# Patient Record
Sex: FEMALE | Race: WHITE | NOT HISPANIC OR LATINO | Employment: OTHER | URBAN - NONMETROPOLITAN AREA
[De-identification: names, ages, dates, MRNs, and addresses within clinical notes are randomized per-mention and may not be internally consistent; named-entity substitution may affect disease eponyms.]

---

## 2017-04-03 RX ORDER — TRIAMTERENE AND HYDROCHLOROTHIAZIDE 37.5; 25 MG/1; MG/1
TABLET ORAL
Qty: 90 TABLET | Refills: 3 | Status: SHIPPED | OUTPATIENT
Start: 2017-04-03 | End: 2018-03-09 | Stop reason: CLARIF

## 2017-04-12 DIAGNOSIS — I48.91 ATRIAL FIBRILLATION, NEW ONSET (HCC): ICD-10-CM

## 2017-04-12 RX ORDER — DABIGATRAN ETEXILATE 150 MG/1
150 CAPSULE, COATED PELLETS ORAL DAILY
Qty: 30 CAPSULE | Refills: 5 | Status: SHIPPED | OUTPATIENT
Start: 2017-04-12 | End: 2017-09-07 | Stop reason: SDUPTHER

## 2017-04-17 ENCOUNTER — HOSPITAL ENCOUNTER (OUTPATIENT)
Dept: MAMMOGRAPHY | Facility: HOSPITAL | Age: 82
Discharge: HOME | End: 2017-04-17
Attending: INTERNAL MEDICINE | Admitting: INTERNAL MEDICINE

## 2017-04-17 DIAGNOSIS — C50.512 MALIGNANT NEOPLASM OF LOWER-OUTER QUADRANT OF LEFT FEMALE BREAST (HCC): ICD-10-CM

## 2017-04-17 PROCEDURE — G0279 TOMOSYNTHESIS, MAMMO: HCPCS

## 2017-04-17 PROCEDURE — G0206 DX MAMMO INCL CAD UNI: HCPCS

## 2017-05-05 ENCOUNTER — NURSE ONLY (OUTPATIENT)
Dept: PRIMARY CARE CLINIC | Age: 82
End: 2017-05-05
Payer: MEDICARE

## 2017-05-05 DIAGNOSIS — I10 ESSENTIAL HYPERTENSION: Primary | ICD-10-CM

## 2017-05-05 DIAGNOSIS — E11.9 TYPE 2 DIABETES MELLITUS WITHOUT COMPLICATION, WITHOUT LONG-TERM CURRENT USE OF INSULIN (HCC): ICD-10-CM

## 2017-05-05 DIAGNOSIS — E78.00 PURE HYPERCHOLESTEROLEMIA: ICD-10-CM

## 2017-05-05 LAB
ALBUMIN SERPL-MCNC: 4.4 G/DL (ref 3.5–5.2)
ALP BLD-CCNC: 73 U/L (ref 35–104)
ALT SERPL-CCNC: 19 U/L (ref 5–33)
ANION GAP SERPL CALCULATED.3IONS-SCNC: 18 MMOL/L (ref 7–19)
AST SERPL-CCNC: 24 U/L (ref 5–32)
BILIRUB SERPL-MCNC: 0.4 MG/DL (ref 0.2–1.2)
BUN BLDV-MCNC: 17 MG/DL (ref 8–23)
CALCIUM SERPL-MCNC: 9.8 MG/DL (ref 8.8–10.2)
CHLORIDE BLD-SCNC: 93 MMOL/L (ref 98–111)
CHOLESTEROL, TOTAL: 257 MG/DL (ref 160–199)
CO2: 26 MMOL/L (ref 22–29)
CREAT SERPL-MCNC: 1 MG/DL (ref 0.5–0.9)
GFR NON-AFRICAN AMERICAN: 53
GLOBULIN: 2.5 G/DL
GLUCOSE BLD-MCNC: 160 MG/DL (ref 74–109)
HBA1C MFR BLD: 7.3 %
HCT VFR BLD CALC: 41.1 % (ref 37–47)
HDLC SERPL-MCNC: 61 MG/DL (ref 65–121)
HEMOGLOBIN: 13.8 G/DL (ref 12–16)
LDL CHOLESTEROL CALCULATED: 143 MG/DL
MCH RBC QN AUTO: 30.7 PG (ref 27–31)
MCHC RBC AUTO-ENTMCNC: 33.6 G/DL (ref 33–37)
MCV RBC AUTO: 91.3 FL (ref 81–99)
PDW BLD-RTO: 11.8 % (ref 11.5–14.5)
PLATELET # BLD: 251 K/UL (ref 130–400)
PMV BLD AUTO: 9.9 FL (ref 7.4–10.4)
POTASSIUM SERPL-SCNC: 3.8 MMOL/L (ref 3.5–5)
RBC # BLD: 4.5 M/UL (ref 4.2–5.4)
SODIUM BLD-SCNC: 137 MMOL/L (ref 136–145)
TOTAL PROTEIN: 6.9 G/DL (ref 6.6–8.7)
TRIGL SERPL-MCNC: 263 MG/DL (ref 150–199)
WBC # BLD: 7.6 K/UL (ref 4.8–10.8)

## 2017-05-05 PROCEDURE — 36415 COLL VENOUS BLD VENIPUNCTURE: CPT | Performed by: FAMILY MEDICINE

## 2017-05-11 RX ORDER — ATORVASTATIN CALCIUM 20 MG/1
20 TABLET, FILM COATED ORAL DAILY
Qty: 30 TABLET | Refills: 3 | Status: SHIPPED | OUTPATIENT
Start: 2017-05-11 | End: 2017-10-19 | Stop reason: ALTCHOICE

## 2017-05-18 ENCOUNTER — TRANSCRIBE ORDERS (OUTPATIENT)
Dept: ADMINISTRATIVE | Facility: HOSPITAL | Age: 82
End: 2017-05-18

## 2017-05-18 DIAGNOSIS — R19.05 ABDOMINAL SWELLING, PERIUMBILICAL REGION: Primary | ICD-10-CM

## 2017-05-18 DIAGNOSIS — R10.33 ABDOMINAL PAIN, PERIUMBILIC: ICD-10-CM

## 2017-06-02 ENCOUNTER — HOSPITAL ENCOUNTER (OUTPATIENT)
Dept: CT IMAGING | Facility: HOSPITAL | Age: 82
Discharge: HOME | End: 2017-06-02
Attending: INTERNAL MEDICINE | Admitting: INTERNAL MEDICINE

## 2017-06-02 DIAGNOSIS — R19.05 ABDOMINAL SWELLING, PERIUMBILICAL REGION: ICD-10-CM

## 2017-06-02 DIAGNOSIS — R10.33 ABDOMINAL PAIN, PERIUMBILIC: ICD-10-CM

## 2017-06-02 LAB — CREAT BLDA-MCNC: 0.9 MG/DL (ref 0.6–1.3)

## 2017-06-02 PROCEDURE — 74177 CT ABD & PELVIS W/CONTRAST: CPT

## 2017-06-02 PROCEDURE — 0 IOPAMIDOL 61 % SOLUTION: Performed by: INTERNAL MEDICINE

## 2017-06-02 PROCEDURE — 82565 ASSAY OF CREATININE: CPT

## 2017-06-02 RX ADMIN — IOPAMIDOL 100 ML: 612 INJECTION, SOLUTION INTRAVENOUS at 12:45

## 2017-09-06 ENCOUNTER — OFFICE VISIT (OUTPATIENT)
Dept: PRIMARY CARE CLINIC | Age: 82
End: 2017-09-06
Payer: MEDICARE

## 2017-09-06 VITALS
HEART RATE: 68 BPM | RESPIRATION RATE: 16 BRPM | BODY MASS INDEX: 36.49 KG/M2 | HEIGHT: 59 IN | TEMPERATURE: 97.5 F | DIASTOLIC BLOOD PRESSURE: 72 MMHG | WEIGHT: 181 LBS | SYSTOLIC BLOOD PRESSURE: 102 MMHG

## 2017-09-06 DIAGNOSIS — E78.00 PURE HYPERCHOLESTEROLEMIA: ICD-10-CM

## 2017-09-06 DIAGNOSIS — F41.8 ANXIETY ASSOCIATED WITH DEPRESSION: ICD-10-CM

## 2017-09-06 DIAGNOSIS — Z00.00 ROUTINE GENERAL MEDICAL EXAMINATION AT A HEALTH CARE FACILITY: Primary | ICD-10-CM

## 2017-09-06 DIAGNOSIS — I10 ESSENTIAL HYPERTENSION: ICD-10-CM

## 2017-09-06 DIAGNOSIS — E11.9 TYPE 2 DIABETES MELLITUS WITHOUT COMPLICATION, WITHOUT LONG-TERM CURRENT USE OF INSULIN (HCC): ICD-10-CM

## 2017-09-06 LAB
ANION GAP SERPL CALCULATED.3IONS-SCNC: 19 MMOL/L (ref 7–19)
BUN BLDV-MCNC: 17 MG/DL (ref 8–23)
CALCIUM SERPL-MCNC: 9.7 MG/DL (ref 8.8–10.2)
CHLORIDE BLD-SCNC: 97 MMOL/L (ref 98–111)
CO2: 24 MMOL/L (ref 22–29)
CREAT SERPL-MCNC: 1.1 MG/DL (ref 0.5–0.9)
GFR NON-AFRICAN AMERICAN: 48
GLUCOSE BLD-MCNC: 127 MG/DL (ref 74–109)
HBA1C MFR BLD: 6.7 %
POTASSIUM SERPL-SCNC: 3.3 MMOL/L (ref 3.5–5)
SODIUM BLD-SCNC: 140 MMOL/L (ref 136–145)

## 2017-09-06 PROCEDURE — 1036F TOBACCO NON-USER: CPT | Performed by: FAMILY MEDICINE

## 2017-09-06 PROCEDURE — G8427 DOCREV CUR MEDS BY ELIG CLIN: HCPCS | Performed by: FAMILY MEDICINE

## 2017-09-06 PROCEDURE — 36415 COLL VENOUS BLD VENIPUNCTURE: CPT | Performed by: FAMILY MEDICINE

## 2017-09-06 PROCEDURE — 1090F PRES/ABSN URINE INCON ASSESS: CPT | Performed by: FAMILY MEDICINE

## 2017-09-06 PROCEDURE — 1123F ACP DISCUSS/DSCN MKR DOCD: CPT | Performed by: FAMILY MEDICINE

## 2017-09-06 PROCEDURE — 99213 OFFICE O/P EST LOW 20 MIN: CPT | Performed by: FAMILY MEDICINE

## 2017-09-06 PROCEDURE — 4040F PNEUMOC VAC/ADMIN/RCVD: CPT | Performed by: FAMILY MEDICINE

## 2017-09-06 PROCEDURE — G8417 CALC BMI ABV UP PARAM F/U: HCPCS | Performed by: FAMILY MEDICINE

## 2017-09-06 PROCEDURE — G8400 PT W/DXA NO RESULTS DOC: HCPCS | Performed by: FAMILY MEDICINE

## 2017-09-06 PROCEDURE — G0438 PPPS, INITIAL VISIT: HCPCS | Performed by: FAMILY MEDICINE

## 2017-09-06 RX ORDER — PAROXETINE HYDROCHLORIDE 20 MG/1
TABLET, FILM COATED ORAL
Qty: 90 TABLET | Refills: 3 | Status: SHIPPED | OUTPATIENT
Start: 2017-09-06 | End: 2018-02-06 | Stop reason: SDUPTHER

## 2017-09-06 ASSESSMENT — PATIENT HEALTH QUESTIONNAIRE - PHQ9: SUM OF ALL RESPONSES TO PHQ QUESTIONS 1-9: 2

## 2017-09-06 ASSESSMENT — LIFESTYLE VARIABLES: HOW OFTEN DO YOU HAVE A DRINK CONTAINING ALCOHOL: 0

## 2017-09-06 ASSESSMENT — ANXIETY QUESTIONNAIRES: GAD7 TOTAL SCORE: 2

## 2017-09-07 ENCOUNTER — OFFICE VISIT (OUTPATIENT)
Dept: CARDIOLOGY | Age: 82
End: 2017-09-07
Payer: MEDICARE

## 2017-09-07 VITALS
SYSTOLIC BLOOD PRESSURE: 110 MMHG | WEIGHT: 180 LBS | BODY MASS INDEX: 36.29 KG/M2 | HEART RATE: 106 BPM | DIASTOLIC BLOOD PRESSURE: 64 MMHG | HEIGHT: 59 IN

## 2017-09-07 DIAGNOSIS — I10 ESSENTIAL HYPERTENSION: ICD-10-CM

## 2017-09-07 DIAGNOSIS — I38 VALVULAR HEART DISEASE: ICD-10-CM

## 2017-09-07 DIAGNOSIS — I48.91 ATRIAL FIBRILLATION, NEW ONSET (HCC): Primary | ICD-10-CM

## 2017-09-07 PROCEDURE — 1036F TOBACCO NON-USER: CPT | Performed by: INTERNAL MEDICINE

## 2017-09-07 PROCEDURE — G8428 CUR MEDS NOT DOCUMENT: HCPCS | Performed by: INTERNAL MEDICINE

## 2017-09-07 PROCEDURE — G8417 CALC BMI ABV UP PARAM F/U: HCPCS | Performed by: INTERNAL MEDICINE

## 2017-09-07 PROCEDURE — 93000 ELECTROCARDIOGRAM COMPLETE: CPT | Performed by: INTERNAL MEDICINE

## 2017-09-07 PROCEDURE — 1123F ACP DISCUSS/DSCN MKR DOCD: CPT | Performed by: INTERNAL MEDICINE

## 2017-09-07 PROCEDURE — G8400 PT W/DXA NO RESULTS DOC: HCPCS | Performed by: INTERNAL MEDICINE

## 2017-09-07 PROCEDURE — 99213 OFFICE O/P EST LOW 20 MIN: CPT | Performed by: INTERNAL MEDICINE

## 2017-09-07 PROCEDURE — 4040F PNEUMOC VAC/ADMIN/RCVD: CPT | Performed by: INTERNAL MEDICINE

## 2017-09-07 PROCEDURE — 1090F PRES/ABSN URINE INCON ASSESS: CPT | Performed by: INTERNAL MEDICINE

## 2017-09-07 RX ORDER — DABIGATRAN ETEXILATE 150 MG/1
150 CAPSULE, COATED PELLETS ORAL 2 TIMES DAILY
Qty: 60 CAPSULE | Refills: 5 | Status: SHIPPED | OUTPATIENT
Start: 2017-09-07 | End: 2018-04-04 | Stop reason: SDUPTHER

## 2017-09-10 PROBLEM — I10 ESSENTIAL HYPERTENSION: Status: ACTIVE | Noted: 2017-09-10

## 2017-09-10 RX ORDER — CHOLESTYRAMINE LIGHT 4 G/5.7G
POWDER, FOR SUSPENSION ORAL
Qty: 1 CAN | Refills: 5 | Status: ON HOLD | OUTPATIENT
Start: 2017-09-10 | End: 2017-11-09

## 2017-09-10 ASSESSMENT — ENCOUNTER SYMPTOMS: GASTROINTESTINAL NEGATIVE: 1

## 2017-09-11 DIAGNOSIS — R26.9 GAIT ABNORMALITY: ICD-10-CM

## 2017-09-11 DIAGNOSIS — R26.89 LOSS OF BALANCE: Primary | ICD-10-CM

## 2017-10-06 ENCOUNTER — CLINICAL SUPPORT (OUTPATIENT)
Dept: FAMILY MEDICINE CLINIC | Facility: CLINIC | Age: 82
End: 2017-10-06

## 2017-10-06 DIAGNOSIS — Z23 FLU VACCINE NEED: Primary | ICD-10-CM

## 2017-10-06 PROCEDURE — G0008 ADMIN INFLUENZA VIRUS VAC: HCPCS | Performed by: FAMILY MEDICINE

## 2017-10-06 PROCEDURE — 90662 IIV NO PRSV INCREASED AG IM: CPT | Performed by: FAMILY MEDICINE

## 2017-10-06 NOTE — PROGRESS NOTES
Patient is here today for a flu vaccination . Patient has read and signed consent form. Patient has no fever or illness. Patient was given injection in the left deltoid intramuscular. Patient had no reactions or complaints. Flu vacc. Information sheet was given to patient .

## 2017-10-19 ENCOUNTER — OFFICE VISIT (OUTPATIENT)
Dept: CARDIOLOGY | Age: 82
End: 2017-10-19
Payer: MEDICARE

## 2017-10-19 VITALS
WEIGHT: 175 LBS | SYSTOLIC BLOOD PRESSURE: 100 MMHG | DIASTOLIC BLOOD PRESSURE: 62 MMHG | HEIGHT: 60 IN | HEART RATE: 70 BPM | BODY MASS INDEX: 34.36 KG/M2

## 2017-10-19 DIAGNOSIS — I38 VALVULAR HEART DISEASE: ICD-10-CM

## 2017-10-19 DIAGNOSIS — I10 ESSENTIAL HYPERTENSION: ICD-10-CM

## 2017-10-19 DIAGNOSIS — I48.19 PERSISTENT ATRIAL FIBRILLATION (HCC): Primary | ICD-10-CM

## 2017-10-19 PROCEDURE — 99213 OFFICE O/P EST LOW 20 MIN: CPT | Performed by: INTERNAL MEDICINE

## 2017-10-19 PROCEDURE — G8484 FLU IMMUNIZE NO ADMIN: HCPCS | Performed by: INTERNAL MEDICINE

## 2017-10-19 PROCEDURE — G8417 CALC BMI ABV UP PARAM F/U: HCPCS | Performed by: INTERNAL MEDICINE

## 2017-10-19 PROCEDURE — 1036F TOBACCO NON-USER: CPT | Performed by: INTERNAL MEDICINE

## 2017-10-19 PROCEDURE — 4040F PNEUMOC VAC/ADMIN/RCVD: CPT | Performed by: INTERNAL MEDICINE

## 2017-10-19 PROCEDURE — 1090F PRES/ABSN URINE INCON ASSESS: CPT | Performed by: INTERNAL MEDICINE

## 2017-10-19 PROCEDURE — G8400 PT W/DXA NO RESULTS DOC: HCPCS | Performed by: INTERNAL MEDICINE

## 2017-10-19 PROCEDURE — G8427 DOCREV CUR MEDS BY ELIG CLIN: HCPCS | Performed by: INTERNAL MEDICINE

## 2017-10-19 PROCEDURE — 1123F ACP DISCUSS/DSCN MKR DOCD: CPT | Performed by: INTERNAL MEDICINE

## 2017-10-19 RX ORDER — SOTALOL HYDROCHLORIDE 80 MG/1
80 TABLET ORAL 2 TIMES DAILY
Qty: 60 TABLET | Refills: 3 | Status: SHIPPED | OUTPATIENT
Start: 2017-10-19 | End: 2017-12-20 | Stop reason: ALTCHOICE

## 2017-10-19 RX ORDER — SIMVASTATIN 20 MG
20 TABLET ORAL NIGHTLY
COMMUNITY
End: 2018-02-13 | Stop reason: SDUPTHER

## 2017-10-23 NOTE — PROGRESS NOTES
[facility]  [facility], [address], [address] [place] [address], [address] [facility]  The following was transcribed by Ariel Hoyt M.T. Carmen Best : 1935, 80 y.o. Female        Office Visit:  10/19/2017    Chief Complaint   Patient presents with    Follow-up     This is a 6-week follow up.  pt states no cardiac symptoms       HISTORY OF PRESENT ILLNESS  Ms. Carmen Best is seen here for atrial fibrillation, valvular heart disease and hypertension. Saundra presents having recently been in atrial fibrillation and was not on anticoagulation, her Betapace was discontinued and started on Pradaxa approximately six weeks ago. She is having no angina, no overt heart failure, and no syncope. Patient Active Problem List   Diagnosis Code    S/P aortic valve replacement Z95.2    Hyperlipidemia E78.5    Chest pain R07.9    Diabetes mellitus ([facility] E11.9    Family history of early CAD Z80.55    Insomnia G47.00    H/O bicuspid aortic valve Z87.74    History of aortic stenosis Z86.79    Atrial fibrillation, new onset ([facility] I48.91    Valvular heart disease I38    Moderate tricuspid regurgitation I07.1    Essential hypertension I10     Past Medical History:   Diagnosis Date    A-fib ([facility] 2006    s/p surgery     Cancer [facility])     breast    Chest pain     Diabetes mellitus ([facility]     non-insulin dependent    Diaphoresis     profuse    Family history of early CAD     Gastroesophageal reflux disease     H/O bicuspid aortic valve 2015    History of blood transfusion     History of phlebitis     Hx of blood clots     Hyperlipidemia     Hypertension     Moderate tricuspid regurgitation 2016    Osteoarthritis     Valvular heart disease      Past Surgical History:   Procedure Laterality Date    AORTIC VALVE REPLACEMENT  2006    Aortic valve replacement with 21 mm Janett-Lozano pericardial tissue valve.   Addison Alston M.D.   [facility]  AV FISTULA REPAIR      BREAST SURGERY      left masectomy    CARDIOVERSION  01/14/2016    CATARACT REMOVAL      CHOLECYSTECTOMY      COLONOSCOPY      DIAGNOSTIC CARDIAC CATH LAB PROCEDURE  2006    left heart cath, left ventriculography and selective  coronary arteriography    EYE SURGERY      HYSTERECTOMY      JOINT REPLACEMENT      TONSILLECTOMY AND ADENOIDECTOMY      TOTAL KNEE ARTHROPLASTY      bilateral total knee replacement      Family History   Problem Relation Age of Onset    Arthritis Mother     Heart Disease Mother     High Blood Pressure Mother     Heart Disease Father      Social History   Substance Use Topics    Smoking status: Never Smoker    Smokeless tobacco: Never Used    Alcohol use No      Allergies   Allergen Reactions    Sulfa Antibiotics Rash     Outpatient Prescriptions Marked as Taking for the 10/19/17 encounter (Office Visit) with Nasir Gallagher MD   Medication Sig Dispense Refill    simvastatin (ZOCOR) 20 MG tablet Take 20 mg by mouth nightly      sotalol (BETAPACE) 80 MG tablet Take 1 tablet by mouth 2 times daily 60 tablet 3    cholestyramine light (PREVALITE) 4 GM/DOSE powder 1 capful in 8 ounces of water 3 times a day before a meal for diarrhea 1 Can 5    dabigatran (PRADAXA) 150 MG capsule Take 1 capsule by mouth 2 times daily 60 capsule 5    PARoxetine (PAXIL) 20 MG tablet 1 tab po q day for anxiety and depression 90 tablet 3    losartan-hydrochlorothiazide (HYZAAR) 50-12.5 MG per tablet TAKE ONE TABLET BY MOUTH DAILY FOR BLOOD PRESSURE 90 tablet 3    metFORMIN (GLUCOPHAGE) 500 MG tablet Take 1 tablet by mouth 2 times daily (with meals) 60 tablet 3    triamterene-hydrochlorothiazide (MAXZIDE-25) 37.5-25 MG per tablet TAKE ONE TABLET ONCE DAILY FOR FLUID RETENTION & BLOOD PRESSURE 90 tablet 3    omeprazole (PRILOSEC) 40 MG delayed release capsule TAKE ONE CAPSULE BY MOUTH DAILY 90 capsule 3    PROLENSA 0.07 % SOLN 1 drop daily       ibuprofen (ADVIL;MOTRIN) 600 MG tablet TAKE ONE TABLET BY MOUTH EVERY SIX HOURS AS NEEDED FOR PAIN ** MAYCAUSE DROWSINESS 16 tablet 0    bimatoprost (LUMIGAN) 0.03 % ophthalmic drops 1 drop nightly. Data:  BP Readings from Last 3 Encounters:   10/19/17 100/62   09/07/17 110/64   09/06/17 102/72    Pulse Readings from Last 3 Encounters:   10/19/17 70   09/07/17 106   09/06/17 68        REVIEW OF SYSTEMS  Constitutional:  Negative for diaphoresis, fever, appetite change or unexpected weight change. HENT:  Negative for nosebleeds, facial swelling, rhinorrhea and neck stiffness. RESPIRATORY:  Negative shortness of breath, cough or sputum production. No wheezing or stridor. CARDIOVASCULAR:  There is no angina, no overt heart failure, and no syncope. GASTROINTESTINAL:   Negative for abdominal distention. GENITOURINARY:  Negative for dysuria, urgency and frequency. MUSCULOSKELETAL:   Negative for myalgia, arthralgia and gait problem. SKIN:  Negative for color change, pallor, rash and wound. NEUROLOGICAL:   Negative for dizziness, weakness, light-headedness, numbness and headaches. Negative for speech difficulty. HEMATOLOGICAL:   Negative for bruising and bleeding easily. PSYCHIATRIC/BEHAVIORAL:   No excessive anxiety or confusion. Except as noted in the HPI, all other systems are negative. PHYSICAL EXAMINATION  GENERAL:  Alert and oriented x3 in no apparent distress. Short-term and long-term memory intact. Judgment intact. Oriented to time, place and person. No depression, anxiety or agitation. Vital Signs:  /62   Pulse 70   Ht 5' (1.524 m)   Wt 175 lb (79.4 kg)   BMI 34.18 kg/m²    HEAD:  Normocephalic without evidence of old or recent trauma. EYES:  Sclerae clear. Conjunctivae pink. Pupils equal and round. NOSE:  Negative nasal discharge or epistaxis. THROAT:  No lesions on lips or buccal mucosa. NECK:  Supple without mass or JVD.   Carotid pulses 2+ to palpation bilaterally

## 2017-10-27 ENCOUNTER — OFFICE VISIT (OUTPATIENT)
Dept: CARDIOLOGY | Age: 82
End: 2017-10-27
Payer: MEDICARE

## 2017-10-27 VITALS
HEIGHT: 60 IN | WEIGHT: 177 LBS | HEART RATE: 90 BPM | SYSTOLIC BLOOD PRESSURE: 130 MMHG | DIASTOLIC BLOOD PRESSURE: 72 MMHG | BODY MASS INDEX: 34.75 KG/M2

## 2017-10-27 DIAGNOSIS — I48.91 ATRIAL FIBRILLATION, NEW ONSET (HCC): Primary | ICD-10-CM

## 2017-10-27 DIAGNOSIS — I10 ESSENTIAL HYPERTENSION: ICD-10-CM

## 2017-10-27 DIAGNOSIS — I48.91 ATRIAL FIBRILLATION, NEW ONSET (HCC): ICD-10-CM

## 2017-10-27 DIAGNOSIS — I38 VALVULAR HEART DISEASE: ICD-10-CM

## 2017-10-27 LAB
ANION GAP SERPL CALCULATED.3IONS-SCNC: 23 MMOL/L (ref 7–19)
BUN BLDV-MCNC: 18 MG/DL (ref 8–23)
CALCIUM SERPL-MCNC: 10.2 MG/DL (ref 8.8–10.2)
CHLORIDE BLD-SCNC: 98 MMOL/L (ref 98–111)
CO2: 23 MMOL/L (ref 22–29)
CREAT SERPL-MCNC: 1.2 MG/DL (ref 0.5–0.9)
GFR NON-AFRICAN AMERICAN: 43
GLUCOSE BLD-MCNC: 142 MG/DL (ref 74–109)
POTASSIUM SERPL-SCNC: 4.2 MMOL/L (ref 3.5–5)
SODIUM BLD-SCNC: 144 MMOL/L (ref 136–145)

## 2017-10-27 PROCEDURE — G8417 CALC BMI ABV UP PARAM F/U: HCPCS | Performed by: INTERNAL MEDICINE

## 2017-10-27 PROCEDURE — 93000 ELECTROCARDIOGRAM COMPLETE: CPT | Performed by: INTERNAL MEDICINE

## 2017-10-27 PROCEDURE — 1090F PRES/ABSN URINE INCON ASSESS: CPT | Performed by: INTERNAL MEDICINE

## 2017-10-27 PROCEDURE — 1123F ACP DISCUSS/DSCN MKR DOCD: CPT | Performed by: INTERNAL MEDICINE

## 2017-10-27 PROCEDURE — G8400 PT W/DXA NO RESULTS DOC: HCPCS | Performed by: INTERNAL MEDICINE

## 2017-10-27 PROCEDURE — G8484 FLU IMMUNIZE NO ADMIN: HCPCS | Performed by: INTERNAL MEDICINE

## 2017-10-27 PROCEDURE — 1036F TOBACCO NON-USER: CPT | Performed by: INTERNAL MEDICINE

## 2017-10-27 PROCEDURE — G8427 DOCREV CUR MEDS BY ELIG CLIN: HCPCS | Performed by: INTERNAL MEDICINE

## 2017-10-27 PROCEDURE — 99213 OFFICE O/P EST LOW 20 MIN: CPT | Performed by: INTERNAL MEDICINE

## 2017-10-27 PROCEDURE — 4040F PNEUMOC VAC/ADMIN/RCVD: CPT | Performed by: INTERNAL MEDICINE

## 2017-10-27 NOTE — PROGRESS NOTES
[facility]  Formerly [facility], [address] [place] [address] Drive, [address], [address]  The following was transcribed by Rosaline Ahumada, M.T. Joyce King : 1935, 80 y.o. Female        Office Visit:  10/27/2017    Chief Complaint   Patient presents with    Follow-up     pt states numbness in fingers     Atrial Fibrillation      HISTORY OF PRESENT ILLNESS  Ms. Joyce King is seen here for atrial fibrillation, valvular heart disease, and hypertension. This is a one week follow up. Saundra presents today having restarted her Betapace. She is feeling okay but remains in atrial fibrillation. There is no angina, no overt heart failure, no syncope, and no stroke-like problems. The patient denies fevers, chills, or evidence of GI bleeding.      Patient Active Problem List   Diagnosis Code    S/P aortic valve replacement Z95.2    Hyperlipidemia E78.5    Chest pain R07.9    Diabetes mellitus ([address] E11.9    Family history of early CAD Z80.55    Insomnia G47.00    H/O bicuspid aortic valve Z87.74    History of aortic stenosis Z86.79    Atrial fibrillation, new onset ([address] I48.91    Valvular heart disease I38    Moderate tricuspid regurgitation I07.1    Essential hypertension I10     Past Medical History:   Diagnosis Date    A-fib ([address] 2006    s/p surgery     Cancer [facility])     breast    Chest pain     Diabetes mellitus ([address]     non-insulin dependent    Diaphoresis     profuse    Family history of early CAD     Gastroesophageal reflux disease     H/O bicuspid aortic valve 2015    History of blood transfusion     History of phlebitis     Hx of blood clots     Hyperlipidemia     Hypertension     Moderate tricuspid regurgitation 2016    Osteoarthritis     Valvular heart disease      Past Surgical History:   Procedure Laterality Date    AORTIC VALVE REPLACEMENT  2006    Aortic valve replacement with 21 mm Janett-Lozano pericardial tissue valve.  Theo Caldera M.D.   [facility] Delma      left masectomy    CARDIOVERSION  01/14/2016    CATARACT REMOVAL      CHOLECYSTECTOMY      COLONOSCOPY      DIAGNOSTIC CARDIAC CATH LAB PROCEDURE  2006    left heart cath, left ventriculography and selective  coronary arteriography    EYE SURGERY      HYSTERECTOMY      JOINT REPLACEMENT      TONSILLECTOMY AND ADENOIDECTOMY      TOTAL KNEE ARTHROPLASTY      bilateral total knee replacement      Family History   Problem Relation Age of Onset    Arthritis Mother     Heart Disease Mother     High Blood Pressure Mother     Heart Disease Father      Social History   Substance Use Topics    Smoking status: Never Smoker    Smokeless tobacco: Never Used    Alcohol use No      Allergies   Allergen Reactions    Sulfa Antibiotics Rash     Outpatient Prescriptions Marked as Taking for the 10/27/17 encounter (Office Visit) with Eve Vivar MD   Medication Sig Dispense Refill    metFORMIN (GLUCOPHAGE) 500 MG tablet TAKE ONE TABLET BY MOUTH TWICE A DAY WITH MEALS 60 tablet 5    simvastatin (ZOCOR) 20 MG tablet Take 20 mg by mouth nightly      sotalol (BETAPACE) 80 MG tablet Take 1 tablet by mouth 2 times daily 60 tablet 3    cholestyramine light (PREVALITE) 4 GM/DOSE powder 1 capful in 8 ounces of water 3 times a day before a meal for diarrhea 1 Can 5    dabigatran (PRADAXA) 150 MG capsule Take 1 capsule by mouth 2 times daily 60 capsule 5    PARoxetine (PAXIL) 20 MG tablet 1 tab po q day for anxiety and depression 90 tablet 3    losartan-hydrochlorothiazide (HYZAAR) 50-12.5 MG per tablet TAKE ONE TABLET BY MOUTH DAILY FOR BLOOD PRESSURE 90 tablet 3    triamterene-hydrochlorothiazide (MAXZIDE-25) 37.5-25 MG per tablet TAKE ONE TABLET ONCE DAILY FOR FLUID RETENTION & BLOOD PRESSURE 90 tablet 3    omeprazole (PRILOSEC) 40 MG delayed release capsule TAKE ONE CAPSULE BY MOUTH DAILY 90 capsule 3    PROLENSA 0.07 % long-term memory intact. Judgment intact. Oriented to time, place and person. No depression, anxiety or agitation. Vital Signs:  /72   Pulse 90   Ht 5' (1.524 m)   Wt 177 lb (80.3 kg)   BMI 34.57 kg/m²    HEAD:  Normocephalic without evidence of old or recent trauma. EYES:  Sclerae clear. Conjunctivae pink. Pupils equal and round. NOSE:  Negative nasal discharge or epistaxis. THROAT:  No lesions on lips or buccal mucosa. NECK:  Supple without mass or JVD. Carotid pulses 2+ to palpation bilaterally without bruit. No thyromegaly noted. CHEST:  Equal bilateral expansion. RESPIRATORY:  The lungs are clear to auscultation. Normal respiratory effort. CARDIOVASCULAR:   The heart's rhythm is irregularly irregular with normal rate. No audible murmur, gallop or rub noted. ABDOMEN:  Soft, nontender. Exhibits no distension. Bowel sounds are normal.   UPPER EXTREMITY EVALUATION:  Radial pulses palpable bilaterally. No cyanosis, clubbing or edema. LOWER EXTREMITY EVALUATION:  Femoral, popliteal, dorsalis pedis, and posterior tibialis pulses 2+ to palpation bilaterally. No cyanosis, clubbing, or peripheral edema. SKIN:  Warm and dry. MUSCULOSKELETAL:  Normal muscle strength and tone. SKIN:  Warm, dry. NEUROLOGIC:  Cranial nerves II through XII are grossly intact. IMPRESSION / PLAN  1. We will admit her for outpatient cardioversion. 2.  She will return for follow up in six weeks. __________________________________  Claudia Johnson M.D., Ph.D., F.A.C.C.   [facility]    cc (pcp): Lenard Barthel, MD

## 2017-11-09 ENCOUNTER — HOSPITAL ENCOUNTER (OUTPATIENT)
Dept: CARDIAC CATH/INVASIVE PROCEDURES | Age: 82
Discharge: HOME | End: 2017-11-09
Attending: INTERNAL MEDICINE | Admitting: INTERNAL MEDICINE
Payer: MEDICARE

## 2017-11-09 VITALS
SYSTOLIC BLOOD PRESSURE: 105 MMHG | DIASTOLIC BLOOD PRESSURE: 54 MMHG | TEMPERATURE: 98.5 F | WEIGHT: 170 LBS | BODY MASS INDEX: 33.38 KG/M2 | RESPIRATION RATE: 16 BRPM | HEIGHT: 60 IN | HEART RATE: 70 BPM | OXYGEN SATURATION: 97 %

## 2017-11-09 PROCEDURE — 6360000002 HC RX W HCPCS

## 2017-11-09 PROCEDURE — 93005 ELECTROCARDIOGRAM TRACING: CPT

## 2017-11-09 PROCEDURE — 92960 CARDIOVERSION ELECTRIC EXT: CPT | Performed by: INTERNAL MEDICINE

## 2017-11-09 PROCEDURE — 99999 PR OFFICE/OUTPT VISIT,PROCEDURE ONLY: CPT | Performed by: INTERNAL MEDICINE

## 2017-11-09 PROCEDURE — 99024 POSTOP FOLLOW-UP VISIT: CPT | Performed by: INTERNAL MEDICINE

## 2017-11-09 PROCEDURE — 2580000003 HC RX 258: Performed by: INTERNAL MEDICINE

## 2017-11-09 RX ORDER — SODIUM CHLORIDE 9 MG/ML
INJECTION, SOLUTION INTRAVENOUS CONTINUOUS
Status: DISCONTINUED | OUTPATIENT
Start: 2017-11-09 | End: 2017-11-09 | Stop reason: HOSPADM

## 2017-11-09 RX ORDER — SODIUM CHLORIDE 0.9 % (FLUSH) 0.9 %
10 SYRINGE (ML) INJECTION PRN
Status: DISCONTINUED | OUTPATIENT
Start: 2017-11-09 | End: 2017-11-09 | Stop reason: HOSPADM

## 2017-11-09 RX ORDER — SODIUM CHLORIDE 0.9 % (FLUSH) 0.9 %
10 SYRINGE (ML) INJECTION EVERY 12 HOURS SCHEDULED
Status: DISCONTINUED | OUTPATIENT
Start: 2017-11-09 | End: 2017-11-09 | Stop reason: HOSPADM

## 2017-11-09 RX ORDER — NAPROXEN SODIUM 220 MG
220 TABLET ORAL 2 TIMES DAILY WITH MEALS
COMMUNITY
End: 2018-09-14

## 2017-11-09 RX ADMIN — SODIUM CHLORIDE: 9 INJECTION, SOLUTION INTRAVENOUS at 10:23

## 2017-11-09 NOTE — DISCHARGE SUMMARY
[facility] Discharge Summary    Patient ID: Mikel Brennan      Patient's PCP: Theodore Noriega MD    Admit Date: 11/9/2017     Discharge Date:  11/10/2017    Admitting Physician: Evert Browne MD     Discharge Physician: Evert Browne MD     Discharge Diagnoses: Atrial fibrillation converted to normal sinus rhythm    There are no active hospital problems to display for this patient. The patient was seen and examined on day of discharge and this discharge summary is in conjunction with any daily progress note from day of discharge. Hospital Course: The patient is an 80-year-old white female was recently found to be in atrial fibrillation. The patient had been treated with Betapace but was receiving no anticoagulant. The Betapace was discontinued and anticoagulation was instituted. The patient is now 6 weeks out from her anticoagulation having resumed betapace she presents for elective cardioversion. The patient has undergone successful cardioversion. A 200 J discharge failed to revert the patient in normal sinus rhythm. A 360 J discharge did result in sinus rhythm. Consults:   None        Disposition:  Home on the same medicines as admission-with follow-up in 6 weeks     Discharge Instructions/Follow-up:  See separate instructions. Activity and Diet: as directed per discharge instructions. Labs:  For convenience and continuity at follow-up the following most recent labs are provided:  CBC:    Lab Results   Component Value Date    WBC 7.6 05/05/2017    HGB 13.8 05/05/2017    HCT 41.1 05/05/2017     05/05/2017       Renal:    Lab Results   Component Value Date     10/27/2017    K 4.2 10/27/2017    CL 98 10/27/2017    CO2 23 10/27/2017    BUN 18 10/27/2017    CREATININE 1.2 10/27/2017    CALCIUM 10.2 10/27/2017       Discharge Medications:   Current Discharge Medication List           Details   naproxen sodium (ALEVE) 220 MG tablet Take 220 mg by mouth 2 times daily (with meals)      metFORMIN (GLUCOPHAGE) 500 MG tablet TAKE ONE TABLET BY MOUTH TWICE A DAY WITH MEALS  Qty: 60 tablet, Refills: 5      simvastatin (ZOCOR) 20 MG tablet Take 20 mg by mouth nightly      sotalol (BETAPACE) 80 MG tablet Take 1 tablet by mouth 2 times daily  Qty: 60 tablet, Refills: 3      dabigatran (PRADAXA) 150 MG capsule Take 1 capsule by mouth 2 times daily  Qty: 60 capsule, Refills: 5    Associated Diagnoses: Atrial fibrillation, new onset (HCC)      PARoxetine (PAXIL) 20 MG tablet 1 tab po q day for anxiety and depression  Qty: 90 tablet, Refills: 3      omeprazole (PRILOSEC) 40 MG delayed release capsule TAKE ONE CAPSULE BY MOUTH DAILY  Qty: 90 capsule, Refills: 3      PROLENSA 0.07 % SOLN 1 drop daily       bimatoprost (LUMIGAN) 0.03 % ophthalmic drops 1 drop nightly. losartan-hydrochlorothiazide (HYZAAR) 50-12.5 MG per tablet TAKE ONE TABLET BY MOUTH DAILY FOR BLOOD PRESSURE  Qty: 90 tablet, Refills: 3      triamterene-hydrochlorothiazide (MAXZIDE-25) 37.5-25 MG per tablet TAKE ONE TABLET ONCE DAILY FOR FLUID RETENTION & BLOOD PRESSURE  Qty: 90 tablet, Refills: 3               Mark D. Edson Cowden, MD

## 2017-11-09 NOTE — H&P
Hx of blood clots      Hyperlipidemia      Hypertension      Moderate tricuspid regurgitation 01/12/2016    Osteoarthritis      Valvular heart disease           Past Surgical History         Past Surgical History:   Procedure Laterality Date    AORTIC VALVE REPLACEMENT   07/28/2006     Aortic valve replacement with 21 mm Janett-Lozano pericardial tissue valve.   Rene Rapp M.D.   Javier Millashish         left masectomy    CARDIOVERSION   01/14/2016    CATARACT REMOVAL        CHOLECYSTECTOMY        COLONOSCOPY        DIAGNOSTIC CARDIAC CATH LAB PROCEDURE   2006     left heart cath, left ventriculography and selective  coronary arteriography    EYE SURGERY        HYSTERECTOMY        JOINT REPLACEMENT        TONSILLECTOMY AND ADENOIDECTOMY        TOTAL KNEE ARTHROPLASTY         bilateral total knee replacement         Family History         Family History   Problem Relation Age of Onset    Arthritis Mother      Heart Disease Mother      High Blood Pressure Mother      Heart Disease Father                Social History   Substance Use Topics    Smoking status: Never Smoker    Smokeless tobacco: Never Used    Alcohol use No           Allergies   Allergen Reactions    Sulfa Antibiotics Rash      Active Medications          Outpatient Prescriptions Marked as Taking for the 10/27/17 encounter (Office Visit) with Hannah Santos MD   Medication Sig Dispense Refill    metFORMIN (GLUCOPHAGE) 500 MG tablet TAKE ONE TABLET BY MOUTH TWICE A DAY WITH MEALS 60 tablet 5    simvastatin (ZOCOR) 20 MG tablet Take 20 mg by mouth nightly        sotalol (BETAPACE) 80 MG tablet Take 1 tablet by mouth 2 times daily 60 tablet 3    cholestyramine light (PREVALITE) 4 GM/DOSE powder 1 capful in 8 ounces of water 3 times a day before a meal for diarrhea 1 Can 5    dabigatran (PRADAXA) 150 MG capsule Take 1 capsule by mouth 2 times daily 60 capsule 5    PARoxetine

## 2017-11-09 NOTE — PROCEDURES
[facility] COLETTE RODRIGUEZ      PATIENT ID: Marie Hanson    ADMIT DATE:  11/9/2017      PROCEDURE: ELECTIVE CARDIOVERSION     DIAGNOSIS:  ATRIAL fibrillation    PROCEDURE:  The patient received 4 mg of Versed and 100 mg of fentanyl. A 200 J joule discharge was delivered failing to convert the patient in normal sinus rhythm. A 360 J discharge was delivered which reverted the patient to normal sinus rhythm. IMPRESSION:  Successful cardioversion from atrial fibrillation to normal sinus rhythm while receiving  Betapace 80 mg by mouth every 12 hours. Bharath Negrete.  Colleen Rosado MD

## 2017-11-13 ENCOUNTER — TELEPHONE (OUTPATIENT)
Dept: PRIMARY CARE CLINIC | Age: 82
End: 2017-11-13

## 2017-11-13 LAB
EKG P AXIS: 43 DEGREES
EKG P AXIS: NORMAL DEGREES
EKG P-R INTERVAL: 234 MS
EKG P-R INTERVAL: NORMAL MS
EKG Q-T INTERVAL: 380 MS
EKG Q-T INTERVAL: 396 MS
EKG QRS DURATION: 108 MS
EKG QRS DURATION: 110 MS
EKG QTC CALCULATION (BAZETT): 387 MS
EKG QTC CALCULATION (BAZETT): 448 MS
EKG T AXIS: 104 DEGREES
EKG T AXIS: 119 DEGREES

## 2017-11-13 RX ORDER — DEXTROMETHORPHAN HYDROBROMIDE AND PROMETHAZINE HYDROCHLORIDE 15; 6.25 MG/5ML; MG/5ML
SYRUP ORAL
Qty: 120 ML | Refills: 0 | Status: SHIPPED | OUTPATIENT
Start: 2017-11-13 | End: 2017-11-20

## 2017-11-13 RX ORDER — DOXYCYCLINE HYCLATE 100 MG/1
100 CAPSULE ORAL 2 TIMES DAILY
Qty: 20 CAPSULE | Refills: 0 | Status: SHIPPED | OUTPATIENT
Start: 2017-11-13 | End: 2017-11-23

## 2017-11-13 NOTE — TELEPHONE ENCOUNTER
Patient called, stated that she has a terrible cough and wants something sent to [facility]. And please have them to deliver it to her. She stated that she did have a fever on Friday but it has since then broke.

## 2017-12-19 ENCOUNTER — TELEPHONE (OUTPATIENT)
Dept: CARDIOLOGY | Age: 82
End: 2017-12-19

## 2017-12-20 ENCOUNTER — OFFICE VISIT (OUTPATIENT)
Dept: CARDIOLOGY | Age: 82
End: 2017-12-20
Payer: MEDICARE

## 2017-12-20 VITALS
HEART RATE: 82 BPM | HEIGHT: 60 IN | WEIGHT: 168 LBS | DIASTOLIC BLOOD PRESSURE: 66 MMHG | SYSTOLIC BLOOD PRESSURE: 112 MMHG | BODY MASS INDEX: 32.98 KG/M2

## 2017-12-20 DIAGNOSIS — I48.19 PERSISTENT ATRIAL FIBRILLATION (HCC): ICD-10-CM

## 2017-12-20 DIAGNOSIS — I48.20 CHRONIC ATRIAL FIBRILLATION (HCC): Primary | ICD-10-CM

## 2017-12-20 DIAGNOSIS — I38 VALVULAR HEART DISEASE: ICD-10-CM

## 2017-12-20 PROCEDURE — 1123F ACP DISCUSS/DSCN MKR DOCD: CPT | Performed by: INTERNAL MEDICINE

## 2017-12-20 PROCEDURE — 4040F PNEUMOC VAC/ADMIN/RCVD: CPT | Performed by: INTERNAL MEDICINE

## 2017-12-20 PROCEDURE — G8427 DOCREV CUR MEDS BY ELIG CLIN: HCPCS | Performed by: INTERNAL MEDICINE

## 2017-12-20 PROCEDURE — G8484 FLU IMMUNIZE NO ADMIN: HCPCS | Performed by: INTERNAL MEDICINE

## 2017-12-20 PROCEDURE — G8417 CALC BMI ABV UP PARAM F/U: HCPCS | Performed by: INTERNAL MEDICINE

## 2017-12-20 PROCEDURE — 93000 ELECTROCARDIOGRAM COMPLETE: CPT | Performed by: INTERNAL MEDICINE

## 2017-12-20 PROCEDURE — G8400 PT W/DXA NO RESULTS DOC: HCPCS | Performed by: INTERNAL MEDICINE

## 2017-12-20 PROCEDURE — 1036F TOBACCO NON-USER: CPT | Performed by: INTERNAL MEDICINE

## 2017-12-20 PROCEDURE — 99213 OFFICE O/P EST LOW 20 MIN: CPT | Performed by: INTERNAL MEDICINE

## 2017-12-20 PROCEDURE — 1090F PRES/ABSN URINE INCON ASSESS: CPT | Performed by: INTERNAL MEDICINE

## 2017-12-30 NOTE — PROGRESS NOTES
130/72    Pulse Readings from Last 3 Encounters:   12/20/17 82   11/09/17 70   10/27/17 90        REVIEW OF SYSTEMS  Constitutional:  Negative for diaphoresis, fever, appetite change or unexpected weight change. HENT:  Negative for nosebleeds, facial swelling, rhinorrhea and neck stiffness. RESPIRATORY:  Negative shortness of breath, cough or sputum production. No wheezing or stridor. CARDIOVASCULAR:  There is no angina, no overt heart failure, and no syncope. GASTROINTESTINAL:   Negative for abdominal distention. GENITOURINARY:  Negative for dysuria, urgency and frequency. MUSCULOSKELETAL:   Negative for myalgia, arthralgia and gait problem. SKIN:  Negative for color change, pallor, rash and wound. NEUROLOGICAL:   Negative for dizziness, weakness, light-headedness, numbness and headaches. Negative for speech difficulty. HEMATOLOGICAL:   Negative for bruising and bleeding easily. PSYCHIATRIC/BEHAVIORAL:   No excessive anxiety or confusion. Except as noted in the HPI, all other systems are negative. PHYSICAL EXAMINATION  GENERAL:  Alert and oriented x3 in no apparent distress. Short-term and long-term memory intact. Judgment intact. Oriented to time, place and person. No depression, anxiety or agitation. Vital Signs:  /66   Pulse 82   Ht 5' (1.524 m)   Wt 168 lb (76.2 kg)   BMI 32.81 kg/m²    HEAD:  Normocephalic without evidence of old or recent trauma. EYES:  Sclerae clear. Conjunctivae pink. Pupils equal and round. NOSE:  Negative nasal discharge or epistaxis. THROAT:  No lesions on lips or buccal mucosa. NECK:  Supple without mass or JVD. Carotid pulses 2+ to palpation bilaterally without bruit. No thyromegaly noted. CHEST:  Equal bilateral expansion. RESPIRATORY:  The lungs are clear to auscultation. Normal respiratory effort. CARDIOVASCULAR: The heart reveals an irregular irregular rhythm with a normal rate. No audible murmurs or gallops.   ABDOMEN:

## 2018-02-01 ENCOUNTER — OFFICE VISIT (OUTPATIENT)
Dept: CARDIOLOGY | Age: 83
End: 2018-02-01
Payer: MEDICARE

## 2018-02-01 VITALS
WEIGHT: 169 LBS | HEIGHT: 59 IN | HEART RATE: 60 BPM | DIASTOLIC BLOOD PRESSURE: 74 MMHG | SYSTOLIC BLOOD PRESSURE: 134 MMHG | BODY MASS INDEX: 34.07 KG/M2

## 2018-02-01 DIAGNOSIS — I48.19 PERSISTENT ATRIAL FIBRILLATION (HCC): Primary | ICD-10-CM

## 2018-02-01 DIAGNOSIS — I10 ESSENTIAL HYPERTENSION: ICD-10-CM

## 2018-02-01 DIAGNOSIS — I38 VALVULAR HEART DISEASE: ICD-10-CM

## 2018-02-01 PROCEDURE — 1036F TOBACCO NON-USER: CPT | Performed by: INTERNAL MEDICINE

## 2018-02-01 PROCEDURE — 1123F ACP DISCUSS/DSCN MKR DOCD: CPT | Performed by: INTERNAL MEDICINE

## 2018-02-01 PROCEDURE — G8400 PT W/DXA NO RESULTS DOC: HCPCS | Performed by: INTERNAL MEDICINE

## 2018-02-01 PROCEDURE — 4040F PNEUMOC VAC/ADMIN/RCVD: CPT | Performed by: INTERNAL MEDICINE

## 2018-02-01 PROCEDURE — 99213 OFFICE O/P EST LOW 20 MIN: CPT | Performed by: INTERNAL MEDICINE

## 2018-02-01 PROCEDURE — G8427 DOCREV CUR MEDS BY ELIG CLIN: HCPCS | Performed by: INTERNAL MEDICINE

## 2018-02-01 PROCEDURE — G8417 CALC BMI ABV UP PARAM F/U: HCPCS | Performed by: INTERNAL MEDICINE

## 2018-02-01 PROCEDURE — 1090F PRES/ABSN URINE INCON ASSESS: CPT | Performed by: INTERNAL MEDICINE

## 2018-02-01 PROCEDURE — G8484 FLU IMMUNIZE NO ADMIN: HCPCS | Performed by: INTERNAL MEDICINE

## 2018-02-05 NOTE — PROGRESS NOTES
112/66   11/09/17 (!) 105/54    Pulse Readings from Last 3 Encounters:   02/01/18 60   12/20/17 82   11/09/17 70        Estimated body mass index is 34.13 kg/m² as calculated from the following:    Height as of this encounter: 4' 11\" (1.499 m). Weight as of this encounter: 169 lb (76.7 kg). REVIEW OF SYSTEMS  Constitutional:  Negative for diaphoresis, fever, appetite change or unexpected weight change. HENT:  Negative for nosebleeds, facial swelling, rhinorrhea and neck stiffness. RESPIRATORY:  Negative shortness of breath, cough or sputum production. No wheezing or stridor. CARDIOVASCULAR:  There is no angina, no overt heart failure, and no syncope. GASTROINTESTINAL:   Negative for abdominal distention. GENITOURINARY:  Negative for dysuria, urgency and frequency. MUSCULOSKELETAL:   Negative for myalgia, arthralgia and gait problem. SKIN:  Negative for color change, pallor, rash and wound. NEUROLOGICAL:   Negative for dizziness, weakness, light-headedness, numbness and headaches. Negative for speech difficulty. HEMATOLOGICAL:   Negative for bruising and bleeding easily. PSYCHIATRIC/BEHAVIORAL:   No excessive anxiety or confusion. Except as noted in the HPI, all other systems are negative. PHYSICAL EXAMINATION  GENERAL:  Alert and oriented x3 in no apparent distress. Short-term and long-term memory intact. Judgment intact. Oriented to time, place and person. No depression, anxiety or agitation. Vital Signs:  /74   Pulse 60   Ht 4' 11\" (1.499 m)   Wt 169 lb (76.7 kg)   BMI 34.13 kg/m²    HEAD:  Normocephalic without evidence of old or recent trauma. EYES:  Sclerae clear. Conjunctivae pink. Pupils equal and round. NOSE:  Negative nasal discharge or epistaxis. THROAT:  No lesions on lips or buccal mucosa. NECK:  Supple without mass or JVD. Carotid pulses 2+ to palpation bilaterally without bruit. No thyromegaly noted.    CHEST:  Equal bilateral expansion. RESPIRATORY:  The lungs are clear to auscultation. Normal respiratory effort. CARDIOVASCULAR:  The heart's rhythm is irregular with a normal rate. No audible murmurs or gallops. ABDOMEN:  Soft, nontender. Exhibits no distension. Bowel sounds are normal.   UPPER EXTREMITY EVALUATION:  Radial pulses palpable bilaterally. No cyanosis, clubbing or edema. LOWER EXTREMITY EVALUATION:  Femoral, popliteal, dorsalis pedis, and posterior tibialis pulses 2+ to palpation bilaterally. No cyanosis, clubbing, or peripheral edema. SKIN:  Warm and dry. MUSCULOSKELETAL:  Normal muscle strength and tone. SKIN:  Warm, dry. NEUROLOGIC:  Cranial nerves II through XII are grossly intact. IMPRESSION / PLAN  1.  Valvular heart disease without heart failure. 2.  Atrial fibrillation which is persistent. 3.  Hypertension which is well controlled. 4.  Continue current medication regimen. 5.  Wellness visit in six months. Return to see me in a year.             __________________________________  Jasmina Blackwood. Gladys Cordova M.D., Ph.D., F.A.C.C.   [facility]    cc (pcp): Karol Page MD

## 2018-02-13 RX ORDER — SIMVASTATIN 20 MG
TABLET ORAL
Qty: 90 TABLET | Refills: 0 | Status: SHIPPED | OUTPATIENT
Start: 2018-02-13 | End: 2018-03-07 | Stop reason: SDUPTHER

## 2018-03-02 RX ORDER — OMEPRAZOLE 40 MG/1
CAPSULE, DELAYED RELEASE ORAL
Qty: 90 CAPSULE | Refills: 0 | Status: SHIPPED | OUTPATIENT
Start: 2018-03-02 | End: 2018-03-07 | Stop reason: SDUPTHER

## 2018-03-07 ENCOUNTER — OFFICE VISIT (OUTPATIENT)
Dept: PRIMARY CARE CLINIC | Age: 83
End: 2018-03-07
Payer: MEDICARE

## 2018-03-07 VITALS
SYSTOLIC BLOOD PRESSURE: 128 MMHG | OXYGEN SATURATION: 98 % | DIASTOLIC BLOOD PRESSURE: 64 MMHG | TEMPERATURE: 97.3 F | HEART RATE: 94 BPM | WEIGHT: 164 LBS | BODY MASS INDEX: 33.06 KG/M2 | HEIGHT: 59 IN

## 2018-03-07 DIAGNOSIS — K21.9 GASTROESOPHAGEAL REFLUX DISEASE, ESOPHAGITIS PRESENCE NOT SPECIFIED: ICD-10-CM

## 2018-03-07 DIAGNOSIS — I10 ESSENTIAL HYPERTENSION: ICD-10-CM

## 2018-03-07 DIAGNOSIS — Z23 NEED FOR 23-POLYVALENT PNEUMOCOCCAL POLYSACCHARIDE VACCINE: ICD-10-CM

## 2018-03-07 DIAGNOSIS — F32.89 OTHER DEPRESSION: ICD-10-CM

## 2018-03-07 DIAGNOSIS — E11.9 TYPE 2 DIABETES MELLITUS WITHOUT COMPLICATION, WITHOUT LONG-TERM CURRENT USE OF INSULIN (HCC): Primary | ICD-10-CM

## 2018-03-07 DIAGNOSIS — R15.9 INCONTINENCE OF FECES, UNSPECIFIED FECAL INCONTINENCE TYPE: ICD-10-CM

## 2018-03-07 DIAGNOSIS — N18.1 CHRONIC KIDNEY DISEASE, STAGE I: ICD-10-CM

## 2018-03-07 DIAGNOSIS — Z91.81 AT HIGH RISK FOR FALLS: ICD-10-CM

## 2018-03-07 DIAGNOSIS — E78.00 PURE HYPERCHOLESTEROLEMIA: ICD-10-CM

## 2018-03-07 LAB
ALBUMIN SERPL-MCNC: 4.1 G/DL (ref 3.5–5.2)
ALP BLD-CCNC: 71 U/L (ref 35–104)
ALT SERPL-CCNC: 10 U/L (ref 5–33)
ANION GAP SERPL CALCULATED.3IONS-SCNC: 14 MMOL/L (ref 7–19)
AST SERPL-CCNC: 20 U/L (ref 5–32)
BILIRUB SERPL-MCNC: 0.4 MG/DL (ref 0.2–1.2)
BUN BLDV-MCNC: 21 MG/DL (ref 8–23)
CALCIUM SERPL-MCNC: 10.1 MG/DL (ref 8.8–10.2)
CHLORIDE BLD-SCNC: 100 MMOL/L (ref 98–111)
CHOLESTEROL, TOTAL: 238 MG/DL (ref 160–199)
CO2: 28 MMOL/L (ref 22–29)
CREAT SERPL-MCNC: 1 MG/DL (ref 0.5–0.9)
GFR NON-AFRICAN AMERICAN: 53
GLUCOSE BLD-MCNC: 127 MG/DL (ref 74–109)
HBA1C MFR BLD: 6.3 %
HDLC SERPL-MCNC: 68 MG/DL (ref 65–121)
LDL CHOLESTEROL CALCULATED: 130 MG/DL
POTASSIUM SERPL-SCNC: 3.4 MMOL/L (ref 3.5–5)
SODIUM BLD-SCNC: 142 MMOL/L (ref 136–145)
TOTAL PROTEIN: 7.3 G/DL (ref 6.6–8.7)
TRIGL SERPL-MCNC: 201 MG/DL (ref 0–149)

## 2018-03-07 PROCEDURE — G8400 PT W/DXA NO RESULTS DOC: HCPCS | Performed by: FAMILY MEDICINE

## 2018-03-07 PROCEDURE — G8417 CALC BMI ABV UP PARAM F/U: HCPCS | Performed by: FAMILY MEDICINE

## 2018-03-07 PROCEDURE — 99214 OFFICE O/P EST MOD 30 MIN: CPT | Performed by: FAMILY MEDICINE

## 2018-03-07 PROCEDURE — 1090F PRES/ABSN URINE INCON ASSESS: CPT | Performed by: FAMILY MEDICINE

## 2018-03-07 PROCEDURE — G8427 DOCREV CUR MEDS BY ELIG CLIN: HCPCS | Performed by: FAMILY MEDICINE

## 2018-03-07 PROCEDURE — 1036F TOBACCO NON-USER: CPT | Performed by: FAMILY MEDICINE

## 2018-03-07 PROCEDURE — 4040F PNEUMOC VAC/ADMIN/RCVD: CPT | Performed by: FAMILY MEDICINE

## 2018-03-07 PROCEDURE — 36415 COLL VENOUS BLD VENIPUNCTURE: CPT | Performed by: FAMILY MEDICINE

## 2018-03-07 PROCEDURE — G0009 ADMIN PNEUMOCOCCAL VACCINE: HCPCS | Performed by: FAMILY MEDICINE

## 2018-03-07 PROCEDURE — 90732 PPSV23 VACC 2 YRS+ SUBQ/IM: CPT | Performed by: FAMILY MEDICINE

## 2018-03-07 PROCEDURE — G8484 FLU IMMUNIZE NO ADMIN: HCPCS | Performed by: FAMILY MEDICINE

## 2018-03-07 PROCEDURE — 1123F ACP DISCUSS/DSCN MKR DOCD: CPT | Performed by: FAMILY MEDICINE

## 2018-03-07 RX ORDER — OMEPRAZOLE 40 MG/1
CAPSULE, DELAYED RELEASE ORAL
Qty: 90 CAPSULE | Refills: 3 | Status: SHIPPED | OUTPATIENT
Start: 2018-03-07 | End: 2018-07-02 | Stop reason: SDUPTHER

## 2018-03-07 RX ORDER — SIMVASTATIN 20 MG
TABLET ORAL
Qty: 90 TABLET | Refills: 3 | Status: SHIPPED | OUTPATIENT
Start: 2018-03-07 | End: 2018-03-09 | Stop reason: SDUPTHER

## 2018-03-09 RX ORDER — LOSARTAN POTASSIUM AND HYDROCHLOROTHIAZIDE 25; 100 MG/1; MG/1
1 TABLET ORAL DAILY
COMMUNITY
End: 2018-03-09 | Stop reason: SDUPTHER

## 2018-03-09 RX ORDER — SIMVASTATIN 20 MG
TABLET ORAL
Qty: 90 TABLET | Refills: 3 | Status: SHIPPED | OUTPATIENT
Start: 2018-03-09 | End: 2018-08-28 | Stop reason: ALTCHOICE

## 2018-03-09 RX ORDER — LOSARTAN POTASSIUM AND HYDROCHLOROTHIAZIDE 25; 100 MG/1; MG/1
1 TABLET ORAL DAILY
Qty: 30 TABLET | Refills: 3 | Status: SHIPPED | OUTPATIENT
Start: 2018-03-09 | End: 2018-07-23 | Stop reason: SDUPTHER

## 2018-03-18 ASSESSMENT — ENCOUNTER SYMPTOMS: RESPIRATORY NEGATIVE: 1

## 2018-03-19 NOTE — PROGRESS NOTES
Subjective:      Patient ID: Jerry Salazar is a 80 y.o. female who comes in today for a checkup. HPI. Medicare will not pay for a checkup as she has multiple problems that we follow including type II diabetes. Her biggest concern is that she is gradually having fecal incontinence. She says even if she just drinks water she will have a bowel movement. She underwent a cholecystectomy 20 years ago. She is losing weight and says she just isn't hungry. She feels good and is pleased with the weight loss. She is currently on metformin 500 mg twice a day. Glucoses are usually less than 120. She says she sleeps okay. She denies any suicidal thoughts or ideation. She does need a refill on her omeprazole which she takes for GERD. This does control her symptoms. She denies any muscle pain from her simvastatin. She is , lives with her dog and does not smoke or use alcohol.   Jerry Salazar is a 80 y.o. female with the following history as recorded in [facility]:  Patient Active Problem List    Diagnosis Date Noted    Type 2 diabetes mellitus without complication, without long-term current use of insulin ([facility] [address] 03/07/2018    Essential hypertension 09/10/2017    Moderate tricuspid regurgitation 09/29/2016    Valvular heart disease     Persistent atrial fibrillation ([facility] [address]     H/O bicuspid aortic valve 05/19/2015    History of aortic stenosis 05/19/2015    Insomnia 01/22/2013    Family history of early CAD     S/P aortic valve replacement 10/11/2011    Hyperlipidemia 10/11/2011    Chest pain 10/11/2011    Diabetes mellitus ([facility] [address] 10/11/2011     Current Outpatient Prescriptions   Medication Sig Dispense Refill    omeprazole (PRILOSEC) 40 MG delayed release capsule TAKE ONE CAPSULE BY MOUTH DAILY for stomach 90 capsule 3    PARoxetine (PAXIL) 20 MG tablet TAKE ONE TABLET ONCE DAILY IN THE MORNING ** MAY CAUSE DROWSINESS 90 tablet 1    metoprolol tartrate (LOPRESSOR) 25 MG tablet Take 1 tablet by mouth 2 times daily 60 tablet 3    naproxen sodium (ALEVE) 220 MG tablet Take 220 mg by mouth 2 times daily (with meals)      metFORMIN (GLUCOPHAGE) 500 MG tablet TAKE ONE TABLET BY MOUTH TWICE A DAY WITH MEALS 60 tablet 5    dabigatran (PRADAXA) 150 MG capsule Take 1 capsule by mouth 2 times daily 60 capsule 5    PROLENSA 0.07 % SOLN 1 drop daily       bimatoprost (LUMIGAN) 0.03 % ophthalmic drops 1 drop nightly.  losartan-hydrochlorothiazide (HYZAAR) 100-25 MG per tablet Take 1 tablet by mouth daily 30 tablet 3    simvastatin (ZOCOR) 20 MG tablet 1 tablet by mouth in the evening for high cholesterol 90 tablet 3     No current facility-administered medications for this visit. Allergies: Sulfa antibiotics  Past Medical History:   Diagnosis Date    A-fib [facility]) 07/28/2006    s/p surgery     Cancer [facility])     breast    Chest pain     Diabetes mellitus ([address]     non-insulin dependent    Diaphoresis     profuse    Family history of early CAD     Gastroesophageal reflux disease     H/O bicuspid aortic valve 5/19/2015    History of blood transfusion     History of phlebitis     Hx of blood clots     Hyperlipidemia     Hypertension     Moderate tricuspid regurgitation 01/12/2016    Osteoarthritis     Valvular heart disease      Past Surgical History:   Procedure Laterality Date    AORTIC VALVE REPLACEMENT  07/28/2006    Aortic valve replacement with 21 mm Janett-Lozano pericardial tissue valve.   Marisa Lopez M.D.   Ariana Delacruz      left masectomy    CARDIOVERSION  01/14/2016    CATARACT REMOVAL      CHOLECYSTECTOMY      COLONOSCOPY      DIAGNOSTIC CARDIAC CATH LAB PROCEDURE  2006    left heart cath, left ventriculography and selective  coronary arteriography    EYE SURGERY      HYSTERECTOMY      JOINT REPLACEMENT      TONSILLECTOMY AND ADENOIDECTOMY      TOTAL KNEE ARTHROPLASTY      bilateral total knee replacement bowel movements. If you stop the medication and you are still having problems with bowel movements after 3-4 weeks, please call me and we will refer you to a colorectal specialist in [place].

## 2018-04-04 DIAGNOSIS — I48.91 ATRIAL FIBRILLATION, NEW ONSET (HCC): ICD-10-CM

## 2018-04-04 RX ORDER — DABIGATRAN ETEXILATE 150 MG/1
150 CAPSULE, COATED PELLETS ORAL 2 TIMES DAILY
Qty: 60 CAPSULE | Refills: 5 | Status: SHIPPED | OUTPATIENT
Start: 2018-04-04 | End: 2018-12-05 | Stop reason: SDUPTHER

## 2018-04-04 RX ORDER — ATENOLOL 100 MG/1
TABLET ORAL
Qty: 60 CAPSULE | Refills: 0 | OUTPATIENT
Start: 2018-04-04

## 2018-04-04 RX ORDER — SOTALOL HYDROCHLORIDE 80 MG/1
TABLET ORAL
Qty: 60 TABLET | Refills: 0 | OUTPATIENT
Start: 2018-04-04

## 2018-04-18 ENCOUNTER — HOSPITAL ENCOUNTER (OUTPATIENT)
Dept: WOMENS IMAGING | Age: 83
Discharge: HOME | End: 2018-04-18
Payer: MEDICARE

## 2018-04-18 DIAGNOSIS — Z85.3 PERSONAL HISTORY OF BREAST CANCER: ICD-10-CM

## 2018-04-18 DIAGNOSIS — Z78.0 POST-MENOPAUSAL: ICD-10-CM

## 2018-04-18 PROCEDURE — 77065 DX MAMMO INCL CAD UNI: CPT

## 2018-04-18 PROCEDURE — 77080 DXA BONE DENSITY AXIAL: CPT

## 2018-07-23 RX ORDER — LOSARTAN POTASSIUM AND HYDROCHLOROTHIAZIDE 25; 100 MG/1; MG/1
1 TABLET ORAL DAILY
Qty: 30 TABLET | Refills: 3 | Status: SHIPPED | OUTPATIENT
Start: 2018-07-23 | End: 2019-01-02 | Stop reason: SDUPTHER

## 2018-08-06 RX ORDER — TRIAMTERENE AND HYDROCHLOROTHIAZIDE 37.5; 25 MG/1; MG/1
TABLET ORAL
Qty: 90 TABLET | Refills: 3 | Status: SHIPPED | OUTPATIENT
Start: 2018-08-06 | End: 2019-03-25 | Stop reason: ALTCHOICE

## 2018-08-28 ENCOUNTER — OFFICE VISIT (OUTPATIENT)
Dept: CARDIOLOGY | Age: 83
End: 2018-08-28
Payer: MEDICARE

## 2018-08-28 VITALS
HEART RATE: 72 BPM | BODY MASS INDEX: 33.87 KG/M2 | SYSTOLIC BLOOD PRESSURE: 100 MMHG | DIASTOLIC BLOOD PRESSURE: 62 MMHG | HEIGHT: 59 IN | WEIGHT: 168 LBS

## 2018-08-28 DIAGNOSIS — E78.2 MIXED HYPERLIPIDEMIA: ICD-10-CM

## 2018-08-28 DIAGNOSIS — Z95.2 S/P AORTIC VALVE REPLACEMENT: ICD-10-CM

## 2018-08-28 DIAGNOSIS — I48.20 CHRONIC ATRIAL FIBRILLATION (HCC): Primary | ICD-10-CM

## 2018-08-28 DIAGNOSIS — I10 ESSENTIAL HYPERTENSION: ICD-10-CM

## 2018-08-28 PROCEDURE — 99213 OFFICE O/P EST LOW 20 MIN: CPT | Performed by: NURSE PRACTITIONER

## 2018-08-28 PROCEDURE — G8427 DOCREV CUR MEDS BY ELIG CLIN: HCPCS | Performed by: NURSE PRACTITIONER

## 2018-08-28 PROCEDURE — 1123F ACP DISCUSS/DSCN MKR DOCD: CPT | Performed by: NURSE PRACTITIONER

## 2018-08-28 PROCEDURE — 1090F PRES/ABSN URINE INCON ASSESS: CPT | Performed by: NURSE PRACTITIONER

## 2018-08-28 PROCEDURE — 4040F PNEUMOC VAC/ADMIN/RCVD: CPT | Performed by: NURSE PRACTITIONER

## 2018-08-28 PROCEDURE — G8399 PT W/DXA RESULTS DOCUMENT: HCPCS | Performed by: NURSE PRACTITIONER

## 2018-08-28 PROCEDURE — 93000 ELECTROCARDIOGRAM COMPLETE: CPT | Performed by: NURSE PRACTITIONER

## 2018-08-28 PROCEDURE — G8417 CALC BMI ABV UP PARAM F/U: HCPCS | Performed by: NURSE PRACTITIONER

## 2018-08-28 PROCEDURE — 1036F TOBACCO NON-USER: CPT | Performed by: NURSE PRACTITIONER

## 2018-08-28 PROCEDURE — 1101F PT FALLS ASSESS-DOCD LE1/YR: CPT | Performed by: NURSE PRACTITIONER

## 2018-08-28 NOTE — PROGRESS NOTES
non-insulin dependent    Diaphoresis     profuse    Family history of early CAD     Gastroesophageal reflux disease     H/O bicuspid aortic valve 5/19/2015    History of blood transfusion     History of phlebitis     Hx of blood clots     Hyperlipidemia     Hypertension     Moderate tricuspid regurgitation 01/12/2016    Osteoarthritis     Valvular heart disease        Past Surgical History:   Procedure Laterality Date    AORTIC VALVE REPLACEMENT  07/28/2006    Aortic valve replacement with 21 mm Janett-Lozano pericardial tissue valve. Jessica Pizano M.D.   Shefali Moffett      left masectomy    CARDIOVERSION  01/14/2016    CATARACT REMOVAL      CHOLECYSTECTOMY      COLONOSCOPY      DIAGNOSTIC CARDIAC CATH LAB PROCEDURE  2006    left heart cath, left ventriculography and selective  coronary arteriography    EYE SURGERY      HYSTERECTOMY      JOINT REPLACEMENT      TONSILLECTOMY AND ADENOIDECTOMY      TOTAL KNEE ARTHROPLASTY      bilateral total knee replacement       Family History   Problem Relation Age of Onset    Arthritis Mother     Heart Disease Mother     High Blood Pressure Mother     Heart Disease Father        Social History     Social History    Marital status:       Spouse name: N/A    Number of children: 3    Years of education: N/A     Occupational History    retired      Social History Main Topics    Smoking status: Never Smoker    Smokeless tobacco: Never Used    Alcohol use No    Drug use: No    Sexual activity: Not on file      Comment:      Other Topics Concern    Not on file     Social History Narrative    No narrative on file       Allergies   Allergen Reactions    Sulfa Antibiotics Rash         Current Outpatient Prescriptions:     Calcium-Vitamin D 600-200 MG-UNIT TABS, Take by mouth daily, Disp: , Rfl:     metoprolol tartrate (LOPRESSOR) 25 MG tablet, Take 1 tablet by mouth 2 times daily, Disp: 60

## 2018-09-14 ENCOUNTER — OFFICE VISIT (OUTPATIENT)
Dept: PRIMARY CARE CLINIC | Age: 83
End: 2018-09-14
Payer: MEDICARE

## 2018-09-14 VITALS
HEART RATE: 80 BPM | DIASTOLIC BLOOD PRESSURE: 62 MMHG | WEIGHT: 168.8 LBS | BODY MASS INDEX: 34.09 KG/M2 | OXYGEN SATURATION: 97 % | TEMPERATURE: 98.6 F | SYSTOLIC BLOOD PRESSURE: 110 MMHG | RESPIRATION RATE: 16 BRPM

## 2018-09-14 DIAGNOSIS — E78.2 MIXED HYPERLIPIDEMIA: ICD-10-CM

## 2018-09-14 DIAGNOSIS — I48.20 CHRONIC ATRIAL FIBRILLATION (HCC): ICD-10-CM

## 2018-09-14 DIAGNOSIS — E11.9 TYPE 2 DIABETES MELLITUS WITHOUT COMPLICATION, WITHOUT LONG-TERM CURRENT USE OF INSULIN (HCC): Primary | ICD-10-CM

## 2018-09-14 DIAGNOSIS — I10 ESSENTIAL HYPERTENSION: ICD-10-CM

## 2018-09-14 DIAGNOSIS — Z23 NEED FOR INFLUENZA VACCINATION: ICD-10-CM

## 2018-09-14 LAB
ALBUMIN SERPL-MCNC: 4.2 G/DL (ref 3.5–5.2)
ALP BLD-CCNC: 59 U/L (ref 35–104)
ALT SERPL-CCNC: 10 U/L (ref 5–33)
ANION GAP SERPL CALCULATED.3IONS-SCNC: 14 MMOL/L (ref 7–19)
AST SERPL-CCNC: 17 U/L (ref 5–32)
BILIRUB SERPL-MCNC: 0.4 MG/DL (ref 0.2–1.2)
BUN BLDV-MCNC: 18 MG/DL (ref 8–23)
CALCIUM SERPL-MCNC: 9.7 MG/DL (ref 8.8–10.2)
CHLORIDE BLD-SCNC: 96 MMOL/L (ref 98–111)
CHOLESTEROL, TOTAL: 249 MG/DL (ref 160–199)
CO2: 27 MMOL/L (ref 22–29)
CREAT SERPL-MCNC: 1 MG/DL (ref 0.5–0.9)
GFR NON-AFRICAN AMERICAN: 53
GLUCOSE BLD-MCNC: 94 MG/DL (ref 74–109)
HBA1C MFR BLD: 6.3 % (ref 4–6)
HDLC SERPL-MCNC: 74 MG/DL (ref 65–121)
LDL CHOLESTEROL CALCULATED: 147 MG/DL
POTASSIUM SERPL-SCNC: 4 MMOL/L (ref 3.5–5)
SODIUM BLD-SCNC: 137 MMOL/L (ref 136–145)
TOTAL PROTEIN: 7.4 G/DL (ref 6.6–8.7)
TRIGL SERPL-MCNC: 141 MG/DL (ref 0–149)

## 2018-09-14 PROCEDURE — G0008 ADMIN INFLUENZA VIRUS VAC: HCPCS | Performed by: FAMILY MEDICINE

## 2018-09-14 PROCEDURE — 36415 COLL VENOUS BLD VENIPUNCTURE: CPT | Performed by: FAMILY MEDICINE

## 2018-09-14 PROCEDURE — 90688 IIV4 VACCINE SPLT 0.5 ML IM: CPT | Performed by: FAMILY MEDICINE

## 2018-09-14 PROCEDURE — G8399 PT W/DXA RESULTS DOCUMENT: HCPCS | Performed by: FAMILY MEDICINE

## 2018-09-14 PROCEDURE — G8427 DOCREV CUR MEDS BY ELIG CLIN: HCPCS | Performed by: FAMILY MEDICINE

## 2018-09-14 PROCEDURE — 1123F ACP DISCUSS/DSCN MKR DOCD: CPT | Performed by: FAMILY MEDICINE

## 2018-09-14 PROCEDURE — 1101F PT FALLS ASSESS-DOCD LE1/YR: CPT | Performed by: FAMILY MEDICINE

## 2018-09-14 PROCEDURE — 4040F PNEUMOC VAC/ADMIN/RCVD: CPT | Performed by: FAMILY MEDICINE

## 2018-09-14 PROCEDURE — 1036F TOBACCO NON-USER: CPT | Performed by: FAMILY MEDICINE

## 2018-09-14 PROCEDURE — 99214 OFFICE O/P EST MOD 30 MIN: CPT | Performed by: FAMILY MEDICINE

## 2018-09-14 PROCEDURE — 1090F PRES/ABSN URINE INCON ASSESS: CPT | Performed by: FAMILY MEDICINE

## 2018-09-14 PROCEDURE — G8417 CALC BMI ABV UP PARAM F/U: HCPCS | Performed by: FAMILY MEDICINE

## 2018-09-14 NOTE — PATIENT INSTRUCTIONS
lower your leg to the starting position. 4. Repeat 8 to 12 times. 5. Rest for a minute, and repeat the exercise. 6. Turn to your other side and do the same exercise with your other leg. Shallow standing knee bends    1. Stand with your hands lightly resting on a counter or chair in front of you with your feet shoulder-width apart. 2. Slowly bend your knees so that you squat down just like you were going to sit in a chair. Make sure your knees do not go in front of your toes. 3. Lower yourself about 6 inches. Your heels should remain on the floor at all times. 4. Rise slowly to a standing position. 5. Repeat 8 to 12 times. 6. Rest for a minute, and repeat the exercise. Follow-up care is a key part of your treatment and safety. Be sure to make and go to all appointments, and call your doctor if you are having problems. It's also a good idea to know your test results and keep a list of the medicines you take. Where can you learn more? Go to https://[email or web address]. org and sign in to your [employer] account. Enter A375 in the [employer] box to learn more about \"Muscle Conditioning: Exercises. \"     If you do not have an account, please click on the \"Sign Up Now\" link. Current as of: December 7, 2017  Content Version: 11.7  © 2309-1294 [employer]. Care instructions adapted under license by [facility] ([facility]). If you have questions about a medical condition or this instruction, always ask your healthcare professional. [place] [address] any warranty or liability for your use of this information.

## 2018-09-14 NOTE — PROGRESS NOTES
SUBJECTIVE:   80 y.o. female for annual routine Pap and checkup. She is status post hysterectomy in her late 45s because of increased bleeding and she thinks they might have taken her ovaries but she is not sure. Medicare will not pay for an annual routine Pap and she doesn't need one because she had a hysterectomy. However she does have several problems that we follow including type II diabetes, hypertension and hyperlipidemia. She says she is doing fairly well. She is still lonely at times since her  . She lives alone but her son lives nearby. She also has a dog who keeps her company. She does see her heart doctor and she is no longer driving. She denies any chest pain. She doesn't get out and exercise a lot so she does get short of breath at times but she doesn't think it is more than usual. She's had no episodes of severe tachycardia. She also still sees the oncologist who takes care of her breast exams. She is complaining of a little bit of loose stools but no bright red blood per rectum or dark tarry stools. She is not checking her glucoses. She denies any numbness or tingling in her feet. She has had no episodes of hypoglycemia. She says she just relies on the hemoglobin A1c. She tries to watch what she eats. She denies any change in vision.       Patient Active Problem List    Diagnosis Date Noted    Chronic atrial fibrillation ([facility] [address] 2018    Type 2 diabetes mellitus without complication, without long-term current use of insulin ([facility] [address] 2018    Essential hypertension 09/10/2017    Moderate tricuspid regurgitation 2016    H/O bicuspid aortic valve 2015    History of aortic stenosis 2015    Insomnia 2013    Family history of early CAD     S/P aortic valve replacement 10/11/2011    Hyperlipidemia 10/11/2011    Chest pain 10/11/2011     Current Outpatient Prescriptions   Medication Sig Dispense Refill    Calcium-Vitamin D 600-200 MG-UNIT TABS patient doesn't know if she has her ovaries    JOINT REPLACEMENT      TONSILLECTOMY AND ADENOIDECTOMY      TOTAL KNEE ARTHROPLASTY      bilateral total knee replacement     Family History   Problem Relation Age of Onset    Arthritis Mother     Heart Disease Mother     High Blood Pressure Mother     Heart Disease Father      Social History   Substance Use Topics    Smoking status: Never Smoker    Smokeless tobacco: Never Used    Alcohol use No       Allergies: Sulfa antibiotics  No LMP recorded. Patient has had a hysterectomy. ROS:  Feeling well. No dyspnea or chest pain on exertion. No abdominal pain, change in bowel habits, black or bloody stools. No urinary tract symptoms. GYN ROS: none   No neurological complaints. OBJECTIVE:   The patient appears well, alert, oriented x 3, in no distress. /62 (Site: Right Upper Arm, Position: Sitting, Cuff Size: Medium Adult)   Pulse 80   Temp 98.6 °F (37 °C) (Temporal)   Resp 16   Wt 168 lb 12.8 oz (76.6 kg)   SpO2 97%   Breastfeeding? No   BMI 34.09 kg/m²   Skin normal, no suspicious skin lesions. ENT normal.  Neck supple. No adenopathy or thyromegaly. TENISHA. Lungs are clear, good air entry, no wheezes, rhonchi or rales. S1 and S2 normal, regular rate and rhythm. 1/6 systolic murmur heard best at left sternal border, no palpable thrills. No JVD or carotid bruits. Abdomen soft without tenderness, guarding, mass or organomegaly. Extremities show no edema, normal peripheral pulses. Neurological is normal, no focal findings. Sensory exam of the foot is normal, tested with the monofilament. Good pulses, no lesions or ulcers, good peripheral pulses. Psychiatric exam, no signs of depression. BREAST EXAM: Done by oncologist    PELVIC EXAM: Not examined in light of her age, hysterectomy and lack of symptoms. ASSESSMENT:   1. Type 2 diabetes mellitus without complication, without long-term current use of insulin ([address]    2.  Need for influenza

## 2018-12-05 DIAGNOSIS — I48.91 ATRIAL FIBRILLATION, NEW ONSET (HCC): ICD-10-CM

## 2018-12-05 RX ORDER — ATENOLOL 100 MG/1
TABLET ORAL
Qty: 60 CAPSULE | Refills: 5 | Status: SHIPPED | OUTPATIENT
Start: 2018-12-05 | End: 2019-06-06 | Stop reason: SDUPTHER

## 2019-01-02 RX ORDER — LOSARTAN POTASSIUM AND HYDROCHLOROTHIAZIDE 25; 100 MG/1; MG/1
1 TABLET ORAL DAILY
Qty: 30 TABLET | Refills: 3 | Status: SHIPPED | OUTPATIENT
Start: 2019-01-02 | End: 2019-03-25 | Stop reason: DRUGHIGH

## 2019-03-15 ENCOUNTER — OFFICE VISIT (OUTPATIENT)
Dept: PRIMARY CARE CLINIC | Age: 84
End: 2019-03-15
Payer: MEDICARE

## 2019-03-15 VITALS
SYSTOLIC BLOOD PRESSURE: 125 MMHG | HEIGHT: 59 IN | WEIGHT: 177.4 LBS | DIASTOLIC BLOOD PRESSURE: 85 MMHG | HEART RATE: 60 BPM | TEMPERATURE: 97.3 F | OXYGEN SATURATION: 97 % | BODY MASS INDEX: 35.76 KG/M2 | RESPIRATION RATE: 18 BRPM

## 2019-03-15 DIAGNOSIS — E78.2 MIXED HYPERLIPIDEMIA: ICD-10-CM

## 2019-03-15 DIAGNOSIS — I48.20 CHRONIC ATRIAL FIBRILLATION (HCC): ICD-10-CM

## 2019-03-15 DIAGNOSIS — N18.30 CHRONIC KIDNEY DISEASE, STAGE III (MODERATE) (HCC): ICD-10-CM

## 2019-03-15 DIAGNOSIS — Z91.81 AT HIGH RISK FOR FALLS: ICD-10-CM

## 2019-03-15 DIAGNOSIS — E11.9 TYPE 2 DIABETES MELLITUS WITHOUT COMPLICATION, WITHOUT LONG-TERM CURRENT USE OF INSULIN (HCC): Primary | ICD-10-CM

## 2019-03-15 LAB
ALBUMIN SERPL-MCNC: 4.2 G/DL (ref 3.5–5.2)
ALP BLD-CCNC: 56 U/L (ref 35–104)
ALT SERPL-CCNC: 10 U/L (ref 5–33)
ANION GAP SERPL CALCULATED.3IONS-SCNC: 17 MMOL/L (ref 7–19)
AST SERPL-CCNC: 20 U/L (ref 5–32)
BILIRUB SERPL-MCNC: 0.4 MG/DL (ref 0.2–1.2)
BUN BLDV-MCNC: 31 MG/DL (ref 8–23)
CALCIUM SERPL-MCNC: 10.1 MG/DL (ref 8.8–10.2)
CHLORIDE BLD-SCNC: 103 MMOL/L (ref 98–111)
CHOLESTEROL, TOTAL: 264 MG/DL (ref 160–199)
CO2: 22 MMOL/L (ref 22–29)
CREAT SERPL-MCNC: 1.2 MG/DL (ref 0.5–0.9)
GFR NON-AFRICAN AMERICAN: 43
GLUCOSE BLD-MCNC: 133 MG/DL (ref 74–109)
HBA1C MFR BLD: 6.4 % (ref 4–6)
HCT VFR BLD CALC: 42.9 % (ref 37–47)
HDLC SERPL-MCNC: 73 MG/DL (ref 65–121)
HEMOGLOBIN: 13.9 G/DL (ref 12–16)
LDL CHOLESTEROL CALCULATED: 157 MG/DL
MCH RBC QN AUTO: 31 PG (ref 27–31)
MCHC RBC AUTO-ENTMCNC: 32.4 G/DL (ref 33–37)
MCV RBC AUTO: 95.8 FL (ref 81–99)
PDW BLD-RTO: 11.9 % (ref 11.5–14.5)
PLATELET # BLD: 270 K/UL (ref 130–400)
PMV BLD AUTO: 9.8 FL (ref 9.4–12.3)
POTASSIUM SERPL-SCNC: 5 MMOL/L (ref 3.5–5)
RBC # BLD: 4.48 M/UL (ref 4.2–5.4)
SODIUM BLD-SCNC: 142 MMOL/L (ref 136–145)
TOTAL PROTEIN: 7.7 G/DL (ref 6.6–8.7)
TRIGL SERPL-MCNC: 168 MG/DL (ref 0–149)
WBC # BLD: 7.8 K/UL (ref 4.8–10.8)

## 2019-03-15 PROCEDURE — 1101F PT FALLS ASSESS-DOCD LE1/YR: CPT | Performed by: FAMILY MEDICINE

## 2019-03-15 PROCEDURE — 99214 OFFICE O/P EST MOD 30 MIN: CPT | Performed by: FAMILY MEDICINE

## 2019-03-15 PROCEDURE — 36415 COLL VENOUS BLD VENIPUNCTURE: CPT | Performed by: FAMILY MEDICINE

## 2019-03-15 PROCEDURE — 1123F ACP DISCUSS/DSCN MKR DOCD: CPT | Performed by: FAMILY MEDICINE

## 2019-03-15 PROCEDURE — 1090F PRES/ABSN URINE INCON ASSESS: CPT | Performed by: FAMILY MEDICINE

## 2019-03-15 PROCEDURE — G8482 FLU IMMUNIZE ORDER/ADMIN: HCPCS | Performed by: FAMILY MEDICINE

## 2019-03-15 PROCEDURE — G8427 DOCREV CUR MEDS BY ELIG CLIN: HCPCS | Performed by: FAMILY MEDICINE

## 2019-03-15 PROCEDURE — 1036F TOBACCO NON-USER: CPT | Performed by: FAMILY MEDICINE

## 2019-03-15 PROCEDURE — G8399 PT W/DXA RESULTS DOCUMENT: HCPCS | Performed by: FAMILY MEDICINE

## 2019-03-15 PROCEDURE — G8417 CALC BMI ABV UP PARAM F/U: HCPCS | Performed by: FAMILY MEDICINE

## 2019-03-15 PROCEDURE — 4040F PNEUMOC VAC/ADMIN/RCVD: CPT | Performed by: FAMILY MEDICINE

## 2019-03-15 ASSESSMENT — PATIENT HEALTH QUESTIONNAIRE - PHQ9
1. LITTLE INTEREST OR PLEASURE IN DOING THINGS: 0
SUM OF ALL RESPONSES TO PHQ QUESTIONS 1-9: 1
SUM OF ALL RESPONSES TO PHQ QUESTIONS 1-9: 1
2. FEELING DOWN, DEPRESSED OR HOPELESS: 1
SUM OF ALL RESPONSES TO PHQ9 QUESTIONS 1 & 2: 1

## 2019-03-24 ASSESSMENT — ENCOUNTER SYMPTOMS
GASTROINTESTINAL NEGATIVE: 1
SHORTNESS OF BREATH: 1

## 2019-03-25 ENCOUNTER — OFFICE VISIT (OUTPATIENT)
Dept: CARDIOLOGY | Age: 84
End: 2019-03-25
Payer: MEDICARE

## 2019-03-25 VITALS
DIASTOLIC BLOOD PRESSURE: 86 MMHG | WEIGHT: 178 LBS | SYSTOLIC BLOOD PRESSURE: 134 MMHG | BODY MASS INDEX: 35.95 KG/M2 | HEART RATE: 90 BPM

## 2019-03-25 DIAGNOSIS — Z95.828 GREENFIELD FILTER IN PLACE: ICD-10-CM

## 2019-03-25 DIAGNOSIS — I48.91 ATRIAL FIBRILLATION, NEW ONSET (HCC): ICD-10-CM

## 2019-03-25 DIAGNOSIS — I38 VALVULAR HEART DISEASE: ICD-10-CM

## 2019-03-25 DIAGNOSIS — Z95.2 S/P AORTIC VALVE REPLACEMENT: Primary | ICD-10-CM

## 2019-03-25 DIAGNOSIS — I48.20 CHRONIC ATRIAL FIBRILLATION (HCC): ICD-10-CM

## 2019-03-25 DIAGNOSIS — I48.19 PERSISTENT ATRIAL FIBRILLATION (HCC): ICD-10-CM

## 2019-03-25 PROCEDURE — 1090F PRES/ABSN URINE INCON ASSESS: CPT | Performed by: INTERNAL MEDICINE

## 2019-03-25 PROCEDURE — 1036F TOBACCO NON-USER: CPT | Performed by: INTERNAL MEDICINE

## 2019-03-25 PROCEDURE — G8482 FLU IMMUNIZE ORDER/ADMIN: HCPCS | Performed by: INTERNAL MEDICINE

## 2019-03-25 PROCEDURE — 93000 ELECTROCARDIOGRAM COMPLETE: CPT | Performed by: INTERNAL MEDICINE

## 2019-03-25 PROCEDURE — 1123F ACP DISCUSS/DSCN MKR DOCD: CPT | Performed by: INTERNAL MEDICINE

## 2019-03-25 PROCEDURE — 4040F PNEUMOC VAC/ADMIN/RCVD: CPT | Performed by: INTERNAL MEDICINE

## 2019-03-25 PROCEDURE — G8427 DOCREV CUR MEDS BY ELIG CLIN: HCPCS | Performed by: INTERNAL MEDICINE

## 2019-03-25 PROCEDURE — G8399 PT W/DXA RESULTS DOCUMENT: HCPCS | Performed by: INTERNAL MEDICINE

## 2019-03-25 PROCEDURE — 99213 OFFICE O/P EST LOW 20 MIN: CPT | Performed by: INTERNAL MEDICINE

## 2019-03-25 PROCEDURE — 1101F PT FALLS ASSESS-DOCD LE1/YR: CPT | Performed by: INTERNAL MEDICINE

## 2019-03-25 PROCEDURE — G8417 CALC BMI ABV UP PARAM F/U: HCPCS | Performed by: INTERNAL MEDICINE

## 2019-03-25 RX ORDER — LOSARTAN POTASSIUM AND HYDROCHLOROTHIAZIDE 12.5; 5 MG/1; MG/1
1 TABLET ORAL DAILY
COMMUNITY
End: 2019-04-30 | Stop reason: DRUGHIGH

## 2019-03-25 ASSESSMENT — ENCOUNTER SYMPTOMS
ABDOMINAL DISTENTION: 0
NAUSEA: 0
SHORTNESS OF BREATH: 0
CHEST TIGHTNESS: 0
BACK PAIN: 0
WHEEZING: 0
COUGH: 0
BLOOD IN STOOL: 0
CHOKING: 0
APNEA: 0

## 2019-04-12 ENCOUNTER — HOSPITAL ENCOUNTER (OUTPATIENT)
Dept: NON INVASIVE DIAGNOSTICS | Age: 84
Discharge: HOME | End: 2019-04-12
Payer: MEDICARE

## 2019-04-12 DIAGNOSIS — Z95.2 S/P AORTIC VALVE REPLACEMENT: ICD-10-CM

## 2019-04-12 DIAGNOSIS — I48.20 CHRONIC ATRIAL FIBRILLATION (HCC): ICD-10-CM

## 2019-04-12 LAB
LV EF: 65 %
LVEF MODALITY: NORMAL

## 2019-04-12 PROCEDURE — 93306 TTE W/DOPPLER COMPLETE: CPT

## 2019-04-19 ENCOUNTER — HOSPITAL ENCOUNTER (OUTPATIENT)
Dept: WOMENS IMAGING | Age: 84
Discharge: HOME | End: 2019-04-19
Payer: MEDICARE

## 2019-04-19 DIAGNOSIS — Z12.31 ENCOUNTER FOR SCREENING MAMMOGRAM FOR MALIGNANT NEOPLASM OF BREAST: ICD-10-CM

## 2019-04-19 PROCEDURE — 77063 BREAST TOMOSYNTHESIS BI: CPT

## 2019-04-30 ENCOUNTER — OFFICE VISIT (OUTPATIENT)
Dept: CARDIOLOGY | Age: 84
End: 2019-04-30
Payer: MEDICARE

## 2019-04-30 VITALS
DIASTOLIC BLOOD PRESSURE: 80 MMHG | BODY MASS INDEX: 35.28 KG/M2 | HEIGHT: 59 IN | SYSTOLIC BLOOD PRESSURE: 110 MMHG | WEIGHT: 175 LBS | HEART RATE: 80 BPM

## 2019-04-30 DIAGNOSIS — Z86.79 HISTORY OF AORTIC STENOSIS: ICD-10-CM

## 2019-04-30 DIAGNOSIS — E78.2 MIXED HYPERLIPIDEMIA: ICD-10-CM

## 2019-04-30 DIAGNOSIS — I34.0 MODERATE MITRAL REGURGITATION: ICD-10-CM

## 2019-04-30 DIAGNOSIS — I48.20 CHRONIC ATRIAL FIBRILLATION (HCC): ICD-10-CM

## 2019-04-30 DIAGNOSIS — Z95.2 S/P AORTIC VALVE REPLACEMENT: Primary | ICD-10-CM

## 2019-04-30 PROCEDURE — 1090F PRES/ABSN URINE INCON ASSESS: CPT | Performed by: NURSE PRACTITIONER

## 2019-04-30 PROCEDURE — 1123F ACP DISCUSS/DSCN MKR DOCD: CPT | Performed by: NURSE PRACTITIONER

## 2019-04-30 PROCEDURE — G8417 CALC BMI ABV UP PARAM F/U: HCPCS | Performed by: NURSE PRACTITIONER

## 2019-04-30 PROCEDURE — 4040F PNEUMOC VAC/ADMIN/RCVD: CPT | Performed by: NURSE PRACTITIONER

## 2019-04-30 PROCEDURE — 1036F TOBACCO NON-USER: CPT | Performed by: NURSE PRACTITIONER

## 2019-04-30 PROCEDURE — 99213 OFFICE O/P EST LOW 20 MIN: CPT | Performed by: NURSE PRACTITIONER

## 2019-04-30 PROCEDURE — G8399 PT W/DXA RESULTS DOCUMENT: HCPCS | Performed by: NURSE PRACTITIONER

## 2019-04-30 PROCEDURE — G8427 DOCREV CUR MEDS BY ELIG CLIN: HCPCS | Performed by: NURSE PRACTITIONER

## 2019-04-30 RX ORDER — LOSARTAN POTASSIUM AND HYDROCHLOROTHIAZIDE 25; 100 MG/1; MG/1
1 TABLET ORAL DAILY
Refills: 3 | COMMUNITY
Start: 2019-04-02 | End: 2019-05-06

## 2019-04-30 RX ORDER — LOSARTAN POTASSIUM AND HYDROCHLOROTHIAZIDE 12.5; 5 MG/1; MG/1
1 TABLET ORAL DAILY
COMMUNITY
End: 2019-05-06

## 2019-04-30 NOTE — PROGRESS NOTES
Dear Adonis Sierra MD & Beatrice Hawkins MD,    Thank you for allowing me to participate in the care of Ms. Toshia Lima. She presents today at the [address] [place] [address] in the [facility]. As you know, Ms. Dg Rouse is a 80 y.o. female with history of hypertension, hyperlipidemia, diabetes, hx of blood clots, Valvular heart disease and afib who presents with the chief complaint of 1 month follow up. She is a patient of Dr. Jackson Guerra. HTN-runs well. Won't let her sister (who is nurse) check very often. HLD-no statin, PCP manages   Afib-chronic -pradaxa, BB. No bleeding problems. Dr. Bender increased her BB at last visit. She has not let her sister check her BP/HR. She states she is doing fine. AVR- Dr. Bender repeated Echo. Stable. She otherwise denies chest pain, SOA, ESCALANTE, PND, orthopnea, syncope or near syncope. She has no other complaints. Review of Systems    Constitutional: Negative for fever, chills, diaphoresis, activity change, appetite change, fatigue and unexpected weight change. Eyes: Negative for photophobia, pain, redness and visual disturbance. Respiratory: Negative for apnea, cough, chest tightness, shortness of breath, wheezing and stridor. Cardiovascular: Negative for chest pain, palpitations and leg swelling. Gastrointestinal: Negative for abdominal distention. Genitourinary: Negative for dysuria, urgency and frequency. Musculoskeletal: Negative for myalgias, arthralgias and gait problem. Skin: Negative for color change, pallor, rash and wound. Neurological: Negative for tremors, speech difficulty, weakness and numbness. +dizziness with standing too quickly  Hematological: Does not bruise/bleed easily. Psychiatric/Behavioral: Negative.         Past Medical History:   Diagnosis Date    A-fib [facility]) 07/28/2006    s/p surgery     Cancer [facility])     breast    Chest pain     Diabetes mellitus ([facility] [address]     non-insulin dependent    Diaphoresis     profuse    Family history of early CAD     Gastroesophageal reflux disease     H/O bicuspid aortic valve 5/19/2015    History of blood transfusion     History of phlebitis     Hx of blood clots     Hyperlipidemia     Hypertension     Moderate tricuspid regurgitation 01/12/2016    Osteoarthritis     Valvular heart disease        Past Surgical History:   Procedure Laterality Date    AORTIC VALVE REPLACEMENT  07/28/2006    Aortic valve replacement with 21 mm Janett-Lozano pericardial tissue valve. HERMAN Tristan      left masectomy    CARDIOVERSION  01/14/2016    CATARACT REMOVAL      CHOLECYSTECTOMY      COLONOSCOPY      DIAGNOSTIC CARDIAC CATH LAB PROCEDURE  2006    left heart cath, left ventriculography and selective  coronary arteriography    EYE SURGERY      HYSTERECTOMY, TOTAL ABDOMINAL      patient doesn't know if she has her ovaries    JOINT REPLACEMENT      TONSILLECTOMY AND ADENOIDECTOMY      TOTAL KNEE ARTHROPLASTY      bilateral total knee replacement       Family History   Problem Relation Age of Onset    Arthritis Mother     Heart Disease Mother     High Blood Pressure Mother     Heart Disease Father        Social History     Socioeconomic History    Marital status:       Spouse name: Not on file    Number of children: 3    Years of education: Not on file    Highest education level: Not on file   Occupational History    Occupation: retired   Social Needs    Financial resource strain: Not on file    Food insecurity:     Worry: Not on file     Inability: Not on file   [employer] needs:     Medical: Not on file     Non-medical: Not on file   Tobacco Use    Smoking status: Never Smoker    Smokeless tobacco: Never Used   Substance and Sexual Activity    Alcohol use: No    Drug use: No    Sexual activity: Not on file     Comment:    Lifestyle    Physical activity: Days per week: Not on file     Minutes per session: Not on file    Stress: Not on file   Relationships    Social connections:     Talks on phone: Not on file     Gets together: Not on file     Attends Restorationism service: Not on file     Active member of club or organization: Not on file     Attends meetings of clubs or organizations: Not on file     Relationship status: Not on file    Intimate partner violence:     Fear of current or ex partner: Not on file     Emotionally abused: Not on file     Physically abused: Not on file     Forced sexual activity: Not on file   Other Topics Concern    Not on file   Social History Narrative    Not on file       Allergies   Allergen Reactions    Sulfa Antibiotics Rash         Current Outpatient Medications:     losartan-hydrochlorothiazide (HYZAAR) 100-25 MG per tablet, Take 1 tablet by mouth daily, Disp: , Rfl: 3    losartan-hydrochlorothiazide (HYZAAR) 50-12.5 MG per tablet, Take 1 tablet by mouth daily, Disp: , Rfl:     denosumab (PROLIA) 60 MG/ML SOLN SC injection, Inject 60 mg into the skin every 6 months, Disp: , Rfl:     metoprolol tartrate (LOPRESSOR) 25 MG tablet, Take 1.5 tablets twice daily, Disp: 90 tablet, Rfl: 11    PRADAXA 150 MG capsule, TAKE ONE CAPSULE BY MOUTH TWICE A DAY, Disp: 60 capsule, Rfl: 5    Calcium-Vitamin D 600-200 MG-UNIT TABS, Take by mouth daily, Disp: , Rfl:     omeprazole (PRILOSEC) 40 MG delayed release capsule, TAKE ONE CAPSULE BY MOUTH DAILY, Disp: 90 capsule, Rfl: 1    PARoxetine (PAXIL) 20 MG tablet, TAKE ONE TABLET BY MOUTH EVERY MORNING ** MAY CAUSE DROWSINESS, Disp: 90 tablet, Rfl: 3    PROLENSA 0.07 % SOLN, 1 drop daily , Disp: , Rfl:     bimatoprost (LUMIGAN) 0.03 % ophthalmic drops, 1 drop nightly.  , Disp: , Rfl:     PE:  Vitals:    04/30/19 1032   BP: 110/80   Pulse: 80       Estimated body mass index is 35.35 kg/m² as calculated from the following:    Height as of this encounter: 4' 11\" (1.499 m).     Weight as of this encounter: 175 lb (79.4 kg). Constitutional: She is oriented to person, place, and time. She appears well-developed and well-nourished in no acute distress. Head: Normocephalic and atraumatic. Neck:  Neck supple without JVD present. Cardiovascular: Normal rate, irregularly irregular rhythm, normal heart sounds. no murmur ascultated. No gallop and no friction rub.  no carotid bruits. no peripheral edema. Pulmonary/Chest:  Lungs clear to auscultation bilaterally without evidence of respiratory distress. She without wheezes. She without rales or ronchi. Musculoskeletal: Normal range of motion. Gait is normal cane assitive device. Neurological: She is alert and oriented to person, place, and time. Skin: Skin is warm and dry without rash or pallor. Psychiatric: She has a normal mood and affect. Her behavior is normal. Thought content normal.     Lab Results   Component Value Date    CREATININE 1.2 03/15/2019    CREATININE 1.0 09/14/2018    CREATININE 1.0 03/07/2018    HGB 13.9 03/15/2019    HGB 13.8 05/05/2017    HGB 14.2 01/12/2015       Echo 4/12/2019  Summary   Mitral valve leaflets are mildly thickened with preserved leaflet   mobility.   Moderate mitral regurgitation is present.   Normal functioning bioprosthetic valve in the aortic position.   The peak gradient across the aortic valve is calculated as 15 mmHg by   doppler.   Normal left ventricular size and function.   Mild concentric left ventricular hypertrophy.   Left ventricular ejection fraction is estimated at 65%.  E/A flow reversal noted. Suggestive of diastolic dysfunction.      Signature      ----------------------------------------------------------------   Electronically signed by Nabil Carrera MD(Interpreting   physician) on 04/15/2019 08:56 AM  Assessment    1. S/P aortic valve replacement    2. Mixed hyperlipidemia    3. History of aortic stenosis    4. Chronic atrial fibrillation (HCC)    5.  Moderate mitral regurgitation Plan:    HTN-controlled, no change   HLD-no statin, pcp follows  Afib-chronic, rate controlled on BB, no bleeding on Pradaxa. Asymptomatic. AVR- stable, routine Echo  Mod MR- routine Echo       Disposition - RTC in 6 months with Dr. Juan Bergman or sooner if needed    Please do not hesitate to contact me for any questions or concerns.     Sincerely yours,    AYDEE Ly

## 2019-05-06 RX ORDER — OMEPRAZOLE 40 MG/1
CAPSULE, DELAYED RELEASE ORAL
Qty: 90 CAPSULE | Refills: 3 | Status: SHIPPED | OUTPATIENT
Start: 2019-05-06 | End: 2020-04-15

## 2019-05-06 RX ORDER — LOSARTAN POTASSIUM AND HYDROCHLOROTHIAZIDE 25; 100 MG/1; MG/1
1 TABLET ORAL DAILY
Qty: 30 TABLET | Refills: 3 | Status: SHIPPED | OUTPATIENT
Start: 2019-05-06 | End: 2019-07-26 | Stop reason: SDUPTHER

## 2019-06-06 DIAGNOSIS — I48.91 ATRIAL FIBRILLATION, NEW ONSET (HCC): ICD-10-CM

## 2019-06-06 RX ORDER — ATENOLOL 100 MG/1
TABLET ORAL
Qty: 60 CAPSULE | Refills: 5 | Status: SHIPPED | OUTPATIENT
Start: 2019-06-06 | End: 2020-02-10

## 2019-06-13 DIAGNOSIS — I48.20 CHRONIC ATRIAL FIBRILLATION (HCC): ICD-10-CM

## 2019-06-13 DIAGNOSIS — I38 VALVULAR HEART DISEASE: ICD-10-CM

## 2019-07-05 RX ORDER — PAROXETINE HYDROCHLORIDE 20 MG/1
TABLET, FILM COATED ORAL
Qty: 90 TABLET | Refills: 3 | Status: SHIPPED | OUTPATIENT
Start: 2019-07-05 | End: 2020-06-16

## 2019-07-10 DIAGNOSIS — C50.919 MALIGNANT NEOPLASM OF FEMALE BREAST, UNSPECIFIED ESTROGEN RECEPTOR STATUS, UNSPECIFIED LATERALITY, UNSPECIFIED SITE OF BREAST (HCC): ICD-10-CM

## 2019-07-24 ENCOUNTER — HOSPITAL ENCOUNTER (OUTPATIENT)
Dept: INFUSION THERAPY | Age: 84
Discharge: HOME | End: 2019-07-24
Payer: MEDICARE

## 2019-07-24 DIAGNOSIS — C50.919 MALIGNANT NEOPLASM OF FEMALE BREAST, UNSPECIFIED ESTROGEN RECEPTOR STATUS, UNSPECIFIED LATERALITY, UNSPECIFIED SITE OF BREAST (HCC): Primary | ICD-10-CM

## 2019-07-24 PROCEDURE — 86300 IMMUNOASSAY TUMOR CA 15-3: CPT

## 2019-07-24 PROCEDURE — 80053 COMPREHEN METABOLIC PANEL: CPT

## 2019-07-24 PROCEDURE — 96372 THER/PROPH/DIAG INJ SC/IM: CPT

## 2019-07-24 PROCEDURE — 85025 COMPLETE CBC W/AUTO DIFF WBC: CPT

## 2019-07-24 PROCEDURE — 36415 COLL VENOUS BLD VENIPUNCTURE: CPT

## 2019-07-24 PROCEDURE — 6360000002 HC RX W HCPCS: Performed by: PHYSICIAN ASSISTANT

## 2019-07-24 RX ORDER — DIPHENHYDRAMINE HYDROCHLORIDE 50 MG/ML
50 INJECTION INTRAMUSCULAR; INTRAVENOUS PRN
Status: CANCELLED | OUTPATIENT
Start: 2020-01-12

## 2019-07-24 RX ORDER — METHYLPREDNISOLONE SODIUM SUCCINATE 125 MG/2ML
125 INJECTION, POWDER, LYOPHILIZED, FOR SOLUTION INTRAMUSCULAR; INTRAVENOUS PRN
Status: CANCELLED | OUTPATIENT
Start: 2020-01-12

## 2019-07-24 RX ORDER — DIPHENHYDRAMINE HYDROCHLORIDE 50 MG/ML
50 INJECTION INTRAMUSCULAR; INTRAVENOUS PRN
Status: CANCELLED | OUTPATIENT
Start: 2020-01-22

## 2019-07-24 RX ORDER — METHYLPREDNISOLONE SODIUM SUCCINATE 125 MG/2ML
125 INJECTION, POWDER, LYOPHILIZED, FOR SOLUTION INTRAMUSCULAR; INTRAVENOUS PRN
Status: CANCELLED | OUTPATIENT
Start: 2020-01-22

## 2019-07-26 RX ORDER — LOSARTAN POTASSIUM AND HYDROCHLOROTHIAZIDE 25; 100 MG/1; MG/1
1 TABLET ORAL DAILY
Qty: 90 TABLET | Refills: 1 | Status: SHIPPED | OUTPATIENT
Start: 2019-07-26 | End: 2020-02-03

## 2019-08-02 ENCOUNTER — TELEPHONE (OUTPATIENT)
Dept: CARDIOLOGY | Age: 84
End: 2019-08-02

## 2019-09-06 ENCOUNTER — OFFICE VISIT (OUTPATIENT)
Dept: PRIMARY CARE CLINIC | Age: 84
End: 2019-09-06
Payer: MEDICARE

## 2019-09-06 ENCOUNTER — HOSPITAL ENCOUNTER (OUTPATIENT)
Dept: GENERAL RADIOLOGY | Age: 84
Discharge: HOME | End: 2019-09-06
Payer: MEDICARE

## 2019-09-06 VITALS
SYSTOLIC BLOOD PRESSURE: 124 MMHG | TEMPERATURE: 98 F | HEART RATE: 50 BPM | OXYGEN SATURATION: 98 % | HEIGHT: 60 IN | RESPIRATION RATE: 24 BRPM | DIASTOLIC BLOOD PRESSURE: 78 MMHG | BODY MASS INDEX: 35.53 KG/M2 | WEIGHT: 181 LBS

## 2019-09-06 DIAGNOSIS — M25.551 RIGHT HIP PAIN: Primary | ICD-10-CM

## 2019-09-06 DIAGNOSIS — M25.551 RIGHT HIP PAIN: ICD-10-CM
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
## 2019-09-06 LAB
ALBUMIN SERPL-MCNC: 3.8 G/DL (ref 3.5–5.2)
ALP BLD-CCNC: 62 U/L (ref 35–104)
ALT SERPL-CCNC: 10 U/L (ref 5–33)
ANION GAP SERPL CALCULATED.3IONS-SCNC: 18 MMOL/L (ref 7–19)
AST SERPL-CCNC: 18 U/L (ref 5–32)
BILIRUB SERPL-MCNC: 0.6 MG/DL (ref 0.2–1.2)
BUN BLDV-MCNC: 25 MG/DL (ref 8–23)
CALCIUM SERPL-MCNC: 9.3 MG/DL (ref 8.8–10.2)
CHLORIDE BLD-SCNC: 100 MMOL/L (ref 98–111)
CO2: 21 MMOL/L (ref 22–29)
CREAT SERPL-MCNC: 1.1 MG/DL (ref 0.5–0.9)
GFR NON-AFRICAN AMERICAN: 47
GLUCOSE BLD-MCNC: 161 MG/DL (ref 74–109)
HCT VFR BLD CALC: 38.3 % (ref 37–47)
HEMOGLOBIN: 12.5 G/DL (ref 12–16)
MCH RBC QN AUTO: 29.8 PG (ref 27–31)
MCHC RBC AUTO-ENTMCNC: 32.6 G/DL (ref 33–37)
MCV RBC AUTO: 91.4 FL (ref 81–99)
PDW BLD-RTO: 12.5 % (ref 11.5–14.5)
PLATELET # BLD: 197 K/UL (ref 130–400)
PMV BLD AUTO: 10 FL (ref 9.4–12.3)
POTASSIUM SERPL-SCNC: 3.7 MMOL/L (ref 3.5–5)
RBC # BLD: 4.19 M/UL (ref 4.2–5.4)
SODIUM BLD-SCNC: 139 MMOL/L (ref 136–145)
TOTAL PROTEIN: 7.1 G/DL (ref 6.6–8.7)
WBC # BLD: 10.6 K/UL (ref 4.8–10.8)

## 2019-09-06 PROCEDURE — 4040F PNEUMOC VAC/ADMIN/RCVD: CPT | Performed by: FAMILY MEDICINE

## 2019-09-06 PROCEDURE — 99213 OFFICE O/P EST LOW 20 MIN: CPT | Performed by: FAMILY MEDICINE

## 2019-09-06 PROCEDURE — G8399 PT W/DXA RESULTS DOCUMENT: HCPCS | Performed by: FAMILY MEDICINE

## 2019-09-06 PROCEDURE — 1123F ACP DISCUSS/DSCN MKR DOCD: CPT | Performed by: FAMILY MEDICINE

## 2019-09-06 PROCEDURE — G8417 CALC BMI ABV UP PARAM F/U: HCPCS | Performed by: FAMILY MEDICINE

## 2019-09-06 PROCEDURE — 1090F PRES/ABSN URINE INCON ASSESS: CPT | Performed by: FAMILY MEDICINE

## 2019-09-06 PROCEDURE — 73502 X-RAY EXAM HIP UNI 2-3 VIEWS: CPT

## 2019-09-06 PROCEDURE — G8427 DOCREV CUR MEDS BY ELIG CLIN: HCPCS | Performed by: FAMILY MEDICINE

## 2019-09-06 PROCEDURE — 1036F TOBACCO NON-USER: CPT | Performed by: FAMILY MEDICINE

## 2019-09-06 PROCEDURE — 36415 COLL VENOUS BLD VENIPUNCTURE: CPT | Performed by: FAMILY MEDICINE

## 2019-09-06 NOTE — PATIENT INSTRUCTIONS
Patient Education        Preventing Falls: Care Instructions  Your Care Instructions    Getting around your home safely can be a challenge if you have injuries or health problems that make it easy for you to fall. Loose rugs and furniture in walkways are among the dangers for many older people who have problems walking or who have poor eyesight. People who have conditions such as arthritis, osteoporosis, or dementia also have to be careful not to fall. You can make your home safer with a few simple measures. Follow-up care is a key part of your treatment and safety. Be sure to make and go to all appointments, and call your doctor if you are having problems. It's also a good idea to know your test results and keep a list of the medicines you take. How can you care for yourself at home? Taking care of yourself  · You may get dizzy if you do not drink enough water. To prevent dehydration, drink plenty of fluids, enough so that your urine is light yellow or clear like water. Choose water and other caffeine-free clear liquids. If you have kidney, heart, or liver disease and have to limit fluids, talk with your doctor before you increase the amount of fluids you drink. · Exercise regularly to improve your strength, muscle tone, and balance. Walk if you can. Swimming may be a good choice if you cannot walk easily. · Have your vision and hearing checked each year or any time you notice a change. If you have trouble seeing and hearing, you might not be able to avoid objects and could lose your balance. · Know the side effects of the medicines you take. Ask your doctor or pharmacist whether the medicines you take can affect your balance. Sleeping pills or sedatives can affect your balance. · Limit the amount of alcohol you drink. Alcohol can impair your balance and other senses. · Ask your doctor whether calluses or corns on your feet need to be removed.  If you wear loose-fitting shoes because of calluses or corns, you can lose your balance and fall. · Talk to your doctor if you have numbness in your feet. Preventing falls at home  · Remove raised doorway thresholds, throw rugs, and clutter. Repair loose carpet or raised areas in the floor. · Move furniture and electrical cords to keep them out of walking paths. · Use nonskid floor wax, and wipe up spills right away, especially on ceramic tile floors. · If you use a walker or cane, put rubber tips on it. If you use crutches, clean the bottoms of them regularly with an abrasive pad, such as steel wool. · Keep your house well lit, especially [facility], and outside walkways. Use night-lights in areas such as hallways and bathrooms. Add extra light switches or use remote switches (such as switches that go on or off when you clap your hands) to make it easier to turn lights on if you have to get up during the night. · Install sturdy handrails on stairways. · Move items in your cabinets so that the things you use a lot are on the lower shelves (about waist level). · Keep a cordless phone and a flashlight with new batteries by your bed. If possible, put a phone in each of the main rooms of your house, or carry a cell phone in case you fall and cannot reach a phone. Or, you can wear a device around your neck or wrist. You push a button that sends a signal for help. · Wear low-heeled shoes that fit well and give your feet good support. Use footwear with nonskid soles. Check the heels and soles of your shoes for wear. Repair or replace worn heels or soles. · Do not wear socks without shoes on wood floors. · Walk on the grass when the sidewalks are slippery. If you live in an area that gets snow and ice in the winter, sprinkle salt on slippery steps and sidewalks. Preventing falls in the bath  · Install grab bars and nonskid mats inside and outside your shower or tub and near the toilet and sinks. · Use shower chairs and bath benches.   · Use a hand-held shower head that will allow you to sit while showering. · Get into a tub or shower by putting the weaker leg in first. Get out of a tub or shower with your strong side first.  · Repair loose toilet seats and consider installing a raised toilet seat to make getting on and off the toilet easier. · Keep your bathroom door unlocked while you are in the shower. Where can you learn more? Go to [email or web address] and sign in to your [employer] account. Enter [phone] in the [employer] box to learn more about \"Preventing Falls: Care Instructions. \"     If you do not have an account, please click on the \"Sign Up Now\" link. Current as of: November 7, 2018  Content Version: 12.1  © 6006-4168 Healthwise, Incorporated. Care instructions adapted under license by [facility] ([facility]). If you have questions about a medical condition or this instruction, always ask your healthcare professional. [address] any warranty or liability for your use of this information.

## 2019-09-13 ENCOUNTER — OFFICE VISIT (OUTPATIENT)
Dept: GASTROENTEROLOGY | Facility: CLINIC | Age: 84
End: 2019-09-13

## 2019-09-13 VITALS
SYSTOLIC BLOOD PRESSURE: 138 MMHG | BODY MASS INDEX: 36.89 KG/M2 | TEMPERATURE: 97.5 F | OXYGEN SATURATION: 98 % | WEIGHT: 183 LBS | HEART RATE: 59 BPM | DIASTOLIC BLOOD PRESSURE: 74 MMHG | HEIGHT: 59 IN

## 2019-09-13 DIAGNOSIS — K52.9 CHRONIC DIARRHEA: ICD-10-CM

## 2019-09-13 DIAGNOSIS — I10 ESSENTIAL HYPERTENSION: ICD-10-CM

## 2019-09-13 DIAGNOSIS — D68.9 COAGULOPATHY (HCC): ICD-10-CM

## 2019-09-13 DIAGNOSIS — Z86.010 HISTORY OF ADENOMATOUS POLYP OF COLON: Primary | ICD-10-CM

## 2019-09-13 PROBLEM — Z86.0101 HISTORY OF ADENOMATOUS POLYP OF COLON: Status: ACTIVE | Noted: 2019-09-13

## 2019-09-13 PROCEDURE — 99214 OFFICE O/P EST MOD 30 MIN: CPT | Performed by: NURSE PRACTITIONER

## 2019-09-13 NOTE — PROGRESS NOTES
"      [facility]    Primary Physician Dinane Tolentino MD    9/13/2019    Larisa Gutierrez   1935      Chief Complaint   Patient presents with   • Colonoscopy   chronic diarrhea     Subjective     HPI    Larisa Gutierrez is a 83 y.o. female who presents as a referral for preventative maintenance. She has no complaints of nausea or vomiting. No change in bowels. No wt loss. No BRBPR. No melena. No abdominal pain.     Chronic diarrhea  X 30 years. Has 1- 3 stools per day. Occasionally will note some \"chunks of stool\" but mostly \"colored water\".    Uses imodium at least once per day and does help. No fiber suppl. Not associated with eating.   No rectal bleeding. No wt loss. No abdominal pain.        Last colonoscopy was 9/2014 normal.  The patient does have history of tubular adenomatous colon polyps 2014. The patient does not have history of colon cancer. She is adopted so unsure of family history.     She is on pradaxa. Has history of afib. Cardiologist is Dr. Hercules.     Past Medical History:   Diagnosis Date   • Anemia    • Anxiety    • Arthritis    • Atrial fibrillation (CMS/HCC)    • Breast cancer (CMS/HCC)     left   • Cancer (CMS/HCC)    • Cataract    • Colon polyp    • Deep vein thrombosis (CMS/HCC)    • Depression    • Drug therapy    • GERD (gastroesophageal reflux disease)    • Glaucoma    • Headache    • Heart valve replaced    • Hyperlipidemia    • Hypertension    • Low back pain    • Myocardial infarction (CMS/HCC)    • Pneumonia    • Visual impairment        Past Surgical History:   Procedure Laterality Date   • ANAL FISTULA REPAIR     • AORTIC VALVE REPAIR/REPLACEMENT     • APPENDECTOMY     • BREAST BIOPSY     • BREAST SURGERY     • CATARACT EXTRACTION     • COLONOSCOPY  09/18/2014    normal   • DILATION AND CURETTAGE, DIAGNOSTIC / THERAPEUTIC     • EYE SURGERY     • HYSTERECTOMY     • LAPAROSCOPIC CHOLECYSTECTOMY     • MASTECTOMY Left    • REPLACEMENT TOTAL KNEE   "       Outpatient Medications Marked as Taking for the 9/13/19 encounter (Office Visit) with Renetta Lancaster APRN   Medication Sig Dispense Refill   • bimatoprost (LUMIGAN) 0.03 % ophthalmic drops Daily.     • Bromfenac Sodium (PROLENSA) 0.07 % solution Daily.     • Calcium Citrate-Vitamin D (CALCIUM + D PO) Take  by mouth Daily.     • dabigatran etexilate (PRADAXA) 150 MG capsu Daily.     • losartan-hydrochlorothiazide (HYZAAR) 50-12.5 MG per tablet Daily.     • metoprolol tartrate (LOPRESSOR) 25 MG tablet Take 25 mg by mouth 2 (Two) Times a Day.     • omeprazole (priLOSEC) 40 MG capsule Daily.     • PARoxetine (PAXIL) 20 MG tablet Daily.     • simvastatin (ZOCOR) 20 MG tablet Daily.     • triamterene-hydrochlorothiazide (MAXZIDE-25) 37.5-25 MG per tablet Daily.         Allergies   Allergen Reactions   • Penicillins Other (See Comments)     Just does not work     • Sulfa Antibiotics Itching, Swelling and Rash       Social History     Socioeconomic History   • Marital status:      Spouse name: Not on file   • Number of children: Not on file   • Years of education: Not on file   • Highest education level: Not on file   Tobacco Use   • Smoking status: Never Smoker   • Smokeless tobacco: Never Used   Substance and Sexual Activity   • Alcohol use: No   • Drug use: No   • Sexual activity: Defer       Family History   Adopted: Yes   Problem Relation Age of Onset   • Cancer Mother    • Diabetes Mother    • No Known Problems Father    • No Known Problems Sister    • No Known Problems Brother    • Arthritis Daughter    • Hypertension Son    • No Known Problems Maternal Grandmother    • No Known Problems Maternal Grandfather    • No Known Problems Paternal Grandmother    • No Known Problems Paternal Grandfather    • No Known Problems Son    • No Known Problems Son    • No Known Problems Maternal Aunt    • No Known Problems Paternal Aunt    • Breast cancer Sister    • BRCA 1/2 Neg Hx    • Colon cancer Neg Hx    •  Endometrial cancer Neg Hx    • Ovarian cancer Neg Hx        Review of Systems   Constitutional: Negative for appetite change, chills, fatigue, fever and unexpected weight change.   HENT: Negative for sore throat and trouble swallowing.    Eyes: Negative for visual disturbance.   Respiratory: Negative for cough, shortness of breath and wheezing.    Cardiovascular: Negative for chest pain and palpitations.   Gastrointestinal: Positive for diarrhea. Negative for abdominal distention, abdominal pain, anal bleeding, blood in stool, constipation, nausea and vomiting.        As mentioned in hpi   Genitourinary: Negative for difficulty urinating and hematuria.   Musculoskeletal: Negative for arthralgias and back pain.   Skin: Negative for color change and rash.   Neurological: Negative for dizziness, seizures, syncope, light-headedness and headaches.   Hematological: Negative for adenopathy.   Psychiatric/Behavioral: Negative for confusion. The patient is not nervous/anxious.        Objective     Vitals:    09/13/19 1006   BP: 138/74   Pulse: 59   Temp: 97.5 °F (36.4 °C)   SpO2: 98%         09/13/19  1006   Weight: 83 kg (183 lb)     Body mass index is 36.96 kg/m².    Physical Exam   Constitutional: She appears well-developed and well-nourished. No distress.   HENT:   Head: Normocephalic and atraumatic.   Eyes: EOM are normal. No scleral icterus.   Neck: Neck supple. No JVD present.   Cardiovascular: Normal rate, regular rhythm and normal heart sounds.   Pulmonary/Chest: Effort normal and breath sounds normal.   Abdominal: Soft. Bowel sounds are normal. She exhibits no distension. There is no tenderness.   Musculoskeletal: Normal range of motion. She exhibits no deformity.   Neurological: She is alert.   Skin: Skin is warm and dry. No rash noted.   Psychiatric: She has a normal mood and affect. Her behavior is normal.   Vitals reviewed.      Imaging Results (most recent)     None          Assessment/Plan     Larisa was seen  today for colonoscopy.    Diagnoses and all orders for this visit:    History of adenomatous polyp of colon  -     Case Request; Standing  -     Case Request    Chronic diarrhea    Coagulopathy (CMS/HCC)  Comments:  pradaxa     Essential hypertension    Other orders  -     Follow Anesthesia Guidelines / Standing Orders; Future  -     Implement Anesthesia Orders Day of Procedure; Standing  -     Obtain Informed Consent; Standing  -     Obtain Informed Consent; Future  -     polyethylene glycol (GOLYTELY) 236 g solution; Take 4,000 mL by mouth 1 (One) Time for 1 dose. Take as directed per instruction sheet.       Plan for colonoscopy. The patient was advised to take any blood pressure or heart  medications the morning of  procedure if that is when he/she normally takes.  Will send letter to Dr. Hercules asking if  pradaxa can be held 2 days prior to procedure.      In regards to chronic diarrhea, would recommend starting a fiber suppl daily. Also recommend daily probiotic. May continue imodium prn as long as no alarm symptoms.           Body mass index is 36.96 kg/m².    Patient's Body mass index is 36.96 kg/m². BMI is above normal parameters. Recommendations include: no follow up , recommend weight loss .      COLONOSCOPY WITH ANESTHESIA (N/A)  All risks, benefits, alternatives, and indications of colonoscopy procedure have been discussed with the patient. Risks to include perforation of the colon requiring possible surgery or colostomy, risk of bleeding from biopsies or removal of colon tissue, possibility of missing a colon polyp or cancer, or adverse drug reaction.  Benefits to include the diagnosis and management of disease of the colon and rectum. Alternatives to include barium enema, radiographic evaluation, lab testing or no intervention. Pt verbalizes understanding and agrees.       The patient was advised on the risks of stopping blood thinners for a procedure.  The risks discussed included the risk of  developing myocardial infarction, CVA, embolus, clogging of the arteries or stents, etc.  We discussed the potential consequences of the risks discussed.  The benefits of stopping as well as the alternatives were discussed as well.   Patient is to hold their anticoagulation medication per the direction of their prescribing physician, Dr. Hercules.   A letter will be sent to prescribing This is to prevent any risk or complication from bleeding intra and post procedure. If they develop bleeding post procedure they are to go the emergency department for further evaluation and treatment immediately.         BLAYNE Burris      EMR Dragon/transcription disclaimer:  Much of this encounter note is electronic transcription/translation of spoken language to printed text.  The electronic translation of spoken language may be erroneous, or at times, nonsensical words or phrases may be inadvertently transcribed.  Although I have reviewed the note for such errors, some may still exist.

## 2019-09-15 ASSESSMENT — ENCOUNTER SYMPTOMS
RESPIRATORY NEGATIVE: 1
BACK PAIN: 1

## 2019-09-16 ENCOUNTER — TELEPHONE (OUTPATIENT)
Dept: PRIMARY CARE CLINIC | Age: 84
End: 2019-09-16

## 2019-09-16 ENCOUNTER — TELEPHONE (OUTPATIENT)
Dept: CARDIOLOGY | Age: 84
End: 2019-09-16

## 2019-09-16 NOTE — TELEPHONE ENCOUNTER
Dr Susana Villatoro,   This pt is needing a colonoscopy on 10/28/19 and they are asking if she can hold her Pradaxa 2 days prior to procedure. From Jerry Follow up visit note:  Echo 4/12/2019  Summary   Mitral valve leaflets are mildly thickened with preserved leaflet   mobility.   Moderate mitral regurgitation is present.   Normal functioning bioprosthetic valve in the aortic position.   The peak gradient across the aortic valve is calculated as 15 mmHg by   doppler.   Normal left ventricular size and function.   Mild concentric left ventricular hypertrophy.   Left ventricular ejection fraction is estimated at 65%.  E/A flow reversal noted. Suggestive of diastolic dysfunction.      Signature      ----------------------------------------------------------------   Electronically signed by Christiana Nuñez MD(Interpreting   physician) on 04/15/2019 08:56 AM  Assessment     1. S/P aortic valve replacement    2. Mixed hyperlipidemia    3. History of aortic stenosis    4. Chronic atrial fibrillation (HCC)    5. Moderate mitral regurgitation             Plan:     HTN-controlled, no change   HLD-no statin, pcp follows  Afib-chronic, rate controlled on BB, no bleeding on Pradaxa. Asymptomatic. AVR- stable, routine Echo  Mod MR- routine Echo       She seen you for the first time 3/25/19, then Alvaro 4/30/19, and is coming to see you again 11/4/19. I do not have a place to move her up.       Do you feel comfortable with Pradaxa being held 2 days prior to procedure or do you feel she needs to be seen at the 11/4  Visit first?

## 2019-09-16 NOTE — TELEPHONE ENCOUNTER
Called and spoke with patient she is some better, she is not ready to start PT at this time but will call when or if she is ready

## 2019-09-23 ENCOUNTER — OFFICE VISIT (OUTPATIENT)
Dept: PRIMARY CARE CLINIC | Age: 84
End: 2019-09-23
Payer: MEDICARE

## 2019-09-23 VITALS
RESPIRATION RATE: 18 BRPM | HEIGHT: 59 IN | BODY MASS INDEX: 35.88 KG/M2 | WEIGHT: 178 LBS | OXYGEN SATURATION: 93 % | DIASTOLIC BLOOD PRESSURE: 69 MMHG | TEMPERATURE: 97 F | SYSTOLIC BLOOD PRESSURE: 100 MMHG | HEART RATE: 83 BPM

## 2019-09-23 DIAGNOSIS — R29.6 FALLS FREQUENTLY: ICD-10-CM

## 2019-09-23 DIAGNOSIS — I48.20 CHRONIC ATRIAL FIBRILLATION (HCC): ICD-10-CM

## 2019-09-23 DIAGNOSIS — M54.50 CHRONIC MIDLINE LOW BACK PAIN WITHOUT SCIATICA: ICD-10-CM

## 2019-09-23 DIAGNOSIS — Z00.00 ROUTINE GENERAL MEDICAL EXAMINATION AT A HEALTH CARE FACILITY: Primary | ICD-10-CM

## 2019-09-23 DIAGNOSIS — Z23 NEED FOR INFLUENZA VACCINATION: ICD-10-CM

## 2019-09-23 DIAGNOSIS — E11.9 TYPE 2 DIABETES MELLITUS WITHOUT COMPLICATION, WITHOUT LONG-TERM CURRENT USE OF INSULIN (HCC): ICD-10-CM

## 2019-09-23 DIAGNOSIS — G89.29 CHRONIC MIDLINE LOW BACK PAIN WITHOUT SCIATICA: ICD-10-CM

## 2019-09-23 DIAGNOSIS — I10 ESSENTIAL HYPERTENSION: ICD-10-CM

## 2019-09-23 DIAGNOSIS — E78.2 MIXED HYPERLIPIDEMIA: ICD-10-CM

## 2019-09-23 PROCEDURE — G8427 DOCREV CUR MEDS BY ELIG CLIN: HCPCS | Performed by: FAMILY MEDICINE

## 2019-09-23 PROCEDURE — 90653 IIV ADJUVANT VACCINE IM: CPT | Performed by: FAMILY MEDICINE

## 2019-09-23 PROCEDURE — 1036F TOBACCO NON-USER: CPT | Performed by: FAMILY MEDICINE

## 2019-09-23 PROCEDURE — 99214 OFFICE O/P EST MOD 30 MIN: CPT | Performed by: FAMILY MEDICINE

## 2019-09-23 PROCEDURE — 1090F PRES/ABSN URINE INCON ASSESS: CPT | Performed by: FAMILY MEDICINE

## 2019-09-23 PROCEDURE — 1123F ACP DISCUSS/DSCN MKR DOCD: CPT | Performed by: FAMILY MEDICINE

## 2019-09-23 PROCEDURE — G0439 PPPS, SUBSEQ VISIT: HCPCS | Performed by: FAMILY MEDICINE

## 2019-09-23 PROCEDURE — G8399 PT W/DXA RESULTS DOCUMENT: HCPCS | Performed by: FAMILY MEDICINE

## 2019-09-23 PROCEDURE — 4040F PNEUMOC VAC/ADMIN/RCVD: CPT | Performed by: FAMILY MEDICINE

## 2019-09-23 PROCEDURE — G0008 ADMIN INFLUENZA VIRUS VAC: HCPCS | Performed by: FAMILY MEDICINE

## 2019-09-23 PROCEDURE — G8417 CALC BMI ABV UP PARAM F/U: HCPCS | Performed by: FAMILY MEDICINE

## 2019-09-23 RX ORDER — TRIAMTERENE AND HYDROCHLOROTHIAZIDE 37.5; 25 MG/1; MG/1
1 TABLET ORAL DAILY
COMMUNITY
End: 2019-09-25

## 2019-09-23 ASSESSMENT — PATIENT HEALTH QUESTIONNAIRE - PHQ9
SUM OF ALL RESPONSES TO PHQ QUESTIONS 1-9: 1
SUM OF ALL RESPONSES TO PHQ QUESTIONS 1-9: 1

## 2019-09-23 ASSESSMENT — ENCOUNTER SYMPTOMS
SHORTNESS OF BREATH: 1
WHEEZING: 0
BACK PAIN: 1
COUGH: 0
GASTROINTESTINAL NEGATIVE: 1

## 2019-09-23 ASSESSMENT — LIFESTYLE VARIABLES: HOW OFTEN DO YOU HAVE A DRINK CONTAINING ALCOHOL: 0

## 2019-09-23 NOTE — PROGRESS NOTES
Hematological: Bruises/bleeds easily. Psychiatric/Behavioral: Negative. Objective:   Physical Exam   Constitutional: She is oriented to person, place, and time. She appears well-developed and well-nourished. No distress. Overweight female no acute distress   HENT:   Head: Normocephalic. Right Ear: External ear normal.   Left Ear: External ear normal.   Mouth/Throat: Oropharynx is clear and moist.   Eyes: Pupils are equal, round, and reactive to light. Conjunctivae are normal.   Neck: Normal range of motion. Neck supple. No JVD present. Cardiovascular: Normal rate, regular rhythm and normal heart sounds. Pulmonary/Chest: Effort normal and breath sounds normal.   Abdominal: Soft. Bowel sounds are normal.   Musculoskeletal: Normal range of motion. She exhibits tenderness (tender to palpation across her lower back some associated muscle spasm, is leaning towards the right). She exhibits no edema. Lymphadenopathy:     She has no cervical adenopathy. Neurological: She is alert and oriented to person, place, and time. Sensory exam of the foot is normal, tested with the monofilament. Good pulses, no lesions or ulcers, good peripheral pulses. Skin: Skin is warm and dry. Psychiatric: She has a normal mood and affect. Her behavior is normal. Judgment and thought content normal.   Vitals reviewed. Assessment:      1. Routine general medical examination at a health care facility    2. Type 2 diabetes mellitus without complication, without long-term current use of insulin ([address]    3. Chronic atrial fibrillation ([address]    4. Essential hypertension    5. Mixed hyperlipidemia    6. Falls frequently    7. Chronic midline low back pain without sciatica    8. Need for influenza vaccination            Plan:      MEDICATIONS:  No orders of the defined types were placed in this encounter. Continue current medications. We will refill when needed.       ORDERS:  Orders Placed This Encounter   Procedures

## 2019-09-23 NOTE — PROGRESS NOTES
 Family history of early CAD     Gastroesophageal reflux disease     H/O bicuspid aortic valve 5/19/2015    History of blood transfusion     History of phlebitis     Hx of blood clots     Hyperlipidemia     Hypertension     Moderate tricuspid regurgitation 01/12/2016    Osteoarthritis     Valvular heart disease      Past Surgical History:   Procedure Laterality Date    AORTIC VALVE REPLACEMENT  07/28/2006    Aortic valve replacement with 21 mm Janett-Lozano pericardial tissue valve. Asmita Ivy M.D.   Yanet Dux      left masectomy    CARDIOVERSION  01/14/2016    CATARACT REMOVAL      CHOLECYSTECTOMY      COLONOSCOPY      DIAGNOSTIC CARDIAC CATH LAB PROCEDURE  2006    left heart cath, left ventriculography and selective  coronary arteriography    EYE SURGERY      HYSTERECTOMY, TOTAL ABDOMINAL      patient doesn't know if she has her ovaries    JOINT REPLACEMENT      TONSILLECTOMY AND ADENOIDECTOMY      TOTAL KNEE ARTHROPLASTY      bilateral total knee replacement     Family History   Problem Relation Age of Onset    Arthritis Mother     Heart Disease Mother     High Blood Pressure Mother     Heart Disease Father        CareTeam (Including outside providers/suppliers regularly involved in providing care):   Patient Care Team:  Rocky Mayfield MD as PCP - Shaka Berry MD as PCP - [facility] Empaneled Provider  Marcelino Smith MD (Cardiology)  Josiah Hanson MD as Consulting Physician (Cardiology)    Wt Readings from Last 3 Encounters:   09/23/19 178 lb (80.7 kg)   09/06/19 181 lb (82.1 kg)   04/30/19 175 lb (79.4 kg)     Vitals:    09/23/19 1318   BP: 100/69   Site: Left Upper Arm   Position: Sitting   Cuff Size: Large Adult   Pulse: 83   Resp: 18   Temp: 97 °F (36.1 °C)   TempSrc: Temporal   SpO2: 93%   Weight: 178 lb (80.7 kg)   Height: 4' 11\" (1.499 m)     Body mass index is 35.95 kg/m².     Based upon direct observation of the

## 2019-09-25 ENCOUNTER — TELEPHONE (OUTPATIENT)
Dept: PRIMARY CARE CLINIC | Age: 84
End: 2019-09-25

## 2019-09-25 LAB
ALBUMIN SERPL-MCNC: 4.3 G/DL (ref 3.5–5.2)
ALP BLD-CCNC: 138 U/L (ref 35–104)
ALT SERPL-CCNC: 9 U/L (ref 5–33)
ANION GAP SERPL CALCULATED.3IONS-SCNC: 18 MMOL/L (ref 7–19)
AST SERPL-CCNC: 21 U/L (ref 5–32)
BASOPHILS ABSOLUTE: 0.1 K/UL (ref 0–0.2)
BASOPHILS RELATIVE PERCENT: 1.4 % (ref 0–1)
BILIRUB SERPL-MCNC: 0.5 MG/DL (ref 0.2–1.2)
BUN BLDV-MCNC: 22 MG/DL (ref 8–23)
CALCIUM SERPL-MCNC: 9.2 MG/DL (ref 8.8–10.2)
CHLORIDE BLD-SCNC: 99 MMOL/L (ref 98–111)
CHOLESTEROL, TOTAL: 210 MG/DL (ref 160–199)
CO2: 25 MMOL/L (ref 22–29)
CREAT SERPL-MCNC: 1 MG/DL (ref 0.5–0.9)
EOSINOPHILS ABSOLUTE: 0.2 K/UL (ref 0–0.6)
EOSINOPHILS RELATIVE PERCENT: 2.2 % (ref 0–5)
GFR NON-AFRICAN AMERICAN: 53
GLUCOSE BLD-MCNC: 120 MG/DL (ref 74–109)
HBA1C MFR BLD: 6.6 % (ref 4–6)
HCT VFR BLD CALC: 42.3 % (ref 37–47)
HDLC SERPL-MCNC: 55 MG/DL (ref 65–121)
HEMOGLOBIN: 13.8 G/DL (ref 12–16)
IMMATURE GRANULOCYTES #: 0 K/UL
LDL CHOLESTEROL CALCULATED: 126 MG/DL
LYMPHOCYTES ABSOLUTE: 1.5 K/UL (ref 1.1–4.5)
LYMPHOCYTES RELATIVE PERCENT: 20.9 % (ref 20–40)
MCH RBC QN AUTO: 29.9 PG (ref 27–31)
MCHC RBC AUTO-ENTMCNC: 32.6 G/DL (ref 33–37)
MCV RBC AUTO: 91.8 FL (ref 81–99)
MONOCYTES ABSOLUTE: 0.7 K/UL (ref 0–0.9)
MONOCYTES RELATIVE PERCENT: 9.8 % (ref 0–10)
NEUTROPHILS ABSOLUTE: 4.5 K/UL (ref 1.5–7.5)
NEUTROPHILS RELATIVE PERCENT: 65.4 % (ref 50–65)
PDW BLD-RTO: 12.5 % (ref 11.5–14.5)
PLATELET # BLD: 291 K/UL (ref 130–400)
PMV BLD AUTO: 9.7 FL (ref 9.4–12.3)
POTASSIUM SERPL-SCNC: 3.7 MMOL/L (ref 3.5–5)
RBC # BLD: 4.61 M/UL (ref 4.2–5.4)
SODIUM BLD-SCNC: 142 MMOL/L (ref 136–145)
TOTAL PROTEIN: 7 G/DL (ref 6.6–8.7)
TRIGL SERPL-MCNC: 146 MG/DL (ref 0–149)
WBC # BLD: 6.9 K/UL (ref 4.8–10.8)

## 2019-09-25 NOTE — TELEPHONE ENCOUNTER
[place] Nurse called clarifying if Pt is to take Maxide and Hyzaar both? Maxide is on her med list but the Pt does not have this med.

## 2019-10-01 RX ORDER — LOSARTAN POTASSIUM AND HYDROCHLOROTHIAZIDE 25; 100 MG/1; MG/1
TABLET ORAL
Qty: 30 TABLET | Refills: 3 | Status: SHIPPED | OUTPATIENT
Start: 2019-10-01 | End: 2020-02-03 | Stop reason: SDUPTHER

## 2019-10-23 ENCOUNTER — TELEPHONE (OUTPATIENT)
Dept: GASTROENTEROLOGY | Facility: CLINIC | Age: 84
End: 2019-10-23

## 2019-10-23 NOTE — TELEPHONE ENCOUNTER
Couldn't reach PT.  Called her sister (Macarena Hadley).  She will go over to PTs home and tell her that October 25, is her last day for Pradaxa.

## 2019-10-24 NOTE — TELEPHONE ENCOUNTER
Spoke with Macarena.  She went over yesterday and told PT.  Macarena also made PT a reminder note.      I had  LVM for PT.   PT called me back.  Verbalized understanding.

## 2019-10-25 ENCOUNTER — TELEPHONE (OUTPATIENT)
Dept: PRIMARY CARE CLINIC | Age: 84
End: 2019-10-25

## 2019-10-28 ENCOUNTER — ANESTHESIA EVENT (OUTPATIENT)
Dept: GASTROENTEROLOGY | Facility: HOSPITAL | Age: 84
End: 2019-10-28

## 2019-10-28 ENCOUNTER — ANESTHESIA (OUTPATIENT)
Dept: GASTROENTEROLOGY | Facility: HOSPITAL | Age: 84
End: 2019-10-28

## 2019-10-28 ENCOUNTER — HOSPITAL ENCOUNTER (OUTPATIENT)
Facility: HOSPITAL | Age: 84
Setting detail: HOSPITAL OUTPATIENT SURGERY
Discharge: HOME | End: 2019-10-28
Attending: INTERNAL MEDICINE | Admitting: INTERNAL MEDICINE

## 2019-10-28 VITALS
RESPIRATION RATE: 15 BRPM | BODY MASS INDEX: 35.28 KG/M2 | OXYGEN SATURATION: 97 % | TEMPERATURE: 97.2 F | SYSTOLIC BLOOD PRESSURE: 110 MMHG | DIASTOLIC BLOOD PRESSURE: 50 MMHG | HEIGHT: 59 IN | WEIGHT: 175 LBS | HEART RATE: 73 BPM

## 2019-10-28 DIAGNOSIS — Z86.010 HISTORY OF ADENOMATOUS POLYP OF COLON: ICD-10-CM

## 2019-10-28 PROCEDURE — 88305 TISSUE EXAM BY PATHOLOGIST: CPT | Performed by: INTERNAL MEDICINE

## 2019-10-28 PROCEDURE — 45385 COLONOSCOPY W/LESION REMOVAL: CPT | Performed by: INTERNAL MEDICINE

## 2019-10-28 PROCEDURE — 25010000002 PROPOFOL 10 MG/ML EMULSION: Performed by: NURSE ANESTHETIST, CERTIFIED REGISTERED

## 2019-10-28 RX ORDER — ONDANSETRON 2 MG/ML
4 INJECTION INTRAMUSCULAR; INTRAVENOUS ONCE AS NEEDED
Status: DISCONTINUED | OUTPATIENT
Start: 2019-10-28 | End: 2019-10-28 | Stop reason: HOSPADM

## 2019-10-28 RX ORDER — SODIUM CHLORIDE 9 MG/ML
500 INJECTION, SOLUTION INTRAVENOUS CONTINUOUS PRN
Status: DISCONTINUED | OUTPATIENT
Start: 2019-10-28 | End: 2019-10-28 | Stop reason: HOSPADM

## 2019-10-28 RX ORDER — PROPOFOL 10 MG/ML
VIAL (ML) INTRAVENOUS AS NEEDED
Status: DISCONTINUED | OUTPATIENT
Start: 2019-10-28 | End: 2019-10-28 | Stop reason: SURG

## 2019-10-28 RX ORDER — SODIUM CHLORIDE 0.9 % (FLUSH) 0.9 %
10 SYRINGE (ML) INJECTION AS NEEDED
Status: DISCONTINUED | OUTPATIENT
Start: 2019-10-28 | End: 2019-10-28 | Stop reason: HOSPADM

## 2019-10-28 RX ADMIN — PROPOFOL 160 MG: 10 INJECTION, EMULSION INTRAVENOUS at 10:32

## 2019-10-28 RX ADMIN — SODIUM CHLORIDE 500 ML: 9 INJECTION, SOLUTION INTRAVENOUS at 09:16

## 2019-10-28 RX ADMIN — LIDOCAINE HYDROCHLORIDE 60 MG: 20 INJECTION, SOLUTION INTRAVENOUS at 10:32

## 2019-10-28 NOTE — ANESTHESIA POSTPROCEDURE EVALUATION
"Patient: Larisa Gutierrez    Procedure Summary     Date:  10/28/19 Room / Location:  [facility]COPY [facility] / [facility]    Anesthesia Start:  1029 Anesthesia Stop:  1052    Procedure:  COLONOSCOPY WITH ANESTHESIA (N/A ) Diagnosis:       History of adenomatous polyp of colon      (History of adenomatous polyp of colon [Z86.010])    Surgeon:  Torin Connors MD Provider:  Eev Sevilla CRNA    Anesthesia Type:  MAC ASA Status:  3          Anesthesia Type: MAC  Last vitals  BP   106/55 (10/28/19 0850)   Temp   97.2 °F (36.2 °C) (10/28/19 0850)   Pulse   87 (10/28/19 0850)   Resp   13 (10/28/19 1052)     SpO2   96 % (10/28/19 0850)     Post Anesthesia Care and Evaluation    Patient location during evaluation: PACU  Patient participation: complete - patient participated  Level of consciousness: awake and alert  Pain management: adequate  Airway patency: patent  Anesthetic complications: No anesthetic complications    Cardiovascular status: acceptable  Respiratory status: acceptable  Hydration status: acceptable    Comments: Blood pressure 106/55, pulse 87, temperature 97.2 °F (36.2 °C), temperature source Temporal, resp. rate 13, height 149.9 cm (59\"), weight 79.4 kg (175 lb), SpO2 96 %.    Pt discharged from PACU based on nima score >8      "

## 2019-10-28 NOTE — ANESTHESIA PREPROCEDURE EVALUATION
Anesthesia Evaluation     Patient summary reviewed   no history of anesthetic complications:  NPO Solid Status: > 8 hours             Airway   Mallampati: II  TM distance: >3 FB  Neck ROM: full  Dental    (+) upper dentures and lower dentures    Pulmonary - negative pulmonary ROS   Cardiovascular   Exercise tolerance: good (4-7 METS)    (+) hypertension, past MI  >12 months, dysrhythmias Atrial Fib, hyperlipidemia,       Neuro/Psych- negative ROS  GI/Hepatic/Renal/Endo    (+) obesity,  GERD,  renal disease CRI,     Musculoskeletal     Abdominal    Substance History      OB/GYN          Other      history of cancer (breast)                    Anesthesia Plan    ASA 3     MAC     Anesthetic plan, all risks, benefits, and alternatives have been provided, discussed and informed consent has been obtained with: patient.

## 2019-10-31 LAB
CYTO UR: NORMAL
LAB AP CASE REPORT: NORMAL
PATH REPORT.FINAL DX SPEC: NORMAL
PATH REPORT.GROSS SPEC: NORMAL

## 2019-11-04 ENCOUNTER — OFFICE VISIT (OUTPATIENT)
Dept: CARDIOLOGY | Age: 84
End: 2019-11-04
Payer: MEDICARE

## 2019-11-04 VITALS
HEIGHT: 59 IN | WEIGHT: 177 LBS | HEART RATE: 73 BPM | DIASTOLIC BLOOD PRESSURE: 80 MMHG | BODY MASS INDEX: 35.68 KG/M2 | SYSTOLIC BLOOD PRESSURE: 122 MMHG

## 2019-11-04 DIAGNOSIS — I48.19 PERSISTENT ATRIAL FIBRILLATION (HCC): Primary | ICD-10-CM

## 2019-11-04 PROCEDURE — 99213 OFFICE O/P EST LOW 20 MIN: CPT | Performed by: INTERNAL MEDICINE

## 2019-11-04 PROCEDURE — 1123F ACP DISCUSS/DSCN MKR DOCD: CPT | Performed by: INTERNAL MEDICINE

## 2019-11-04 PROCEDURE — G8427 DOCREV CUR MEDS BY ELIG CLIN: HCPCS | Performed by: INTERNAL MEDICINE

## 2019-11-04 PROCEDURE — G8482 FLU IMMUNIZE ORDER/ADMIN: HCPCS | Performed by: INTERNAL MEDICINE

## 2019-11-04 PROCEDURE — 93000 ELECTROCARDIOGRAM COMPLETE: CPT | Performed by: INTERNAL MEDICINE

## 2019-11-04 PROCEDURE — 1036F TOBACCO NON-USER: CPT | Performed by: INTERNAL MEDICINE

## 2019-11-04 PROCEDURE — G8399 PT W/DXA RESULTS DOCUMENT: HCPCS | Performed by: INTERNAL MEDICINE

## 2019-11-04 PROCEDURE — G8417 CALC BMI ABV UP PARAM F/U: HCPCS | Performed by: INTERNAL MEDICINE

## 2019-11-04 PROCEDURE — 1090F PRES/ABSN URINE INCON ASSESS: CPT | Performed by: INTERNAL MEDICINE

## 2019-11-04 PROCEDURE — 4040F PNEUMOC VAC/ADMIN/RCVD: CPT | Performed by: INTERNAL MEDICINE

## 2019-11-04 RX ORDER — LOPERAMIDE HYDROCHLORIDE 2 MG/1
4 CAPSULE ORAL DAILY
COMMUNITY
End: 2020-09-14

## 2020-01-26 NOTE — PROGRESS NOTES
Progress Note      Pt Name: Galileo Vail: 1935  MRN: [id number]    Date of evaluation: 1/27/2020  History Obtained From:  patient, electronic medical record    CHIEF COMPLAINT:    Chief Complaint   Patient presents with    Breast Cancer     Resected Stage II left breast cancer      Current active problems  Resected Stage II left breast cancer   Senile osteoperosis    HISTORY OF PRESENT ILLNESS:    Brooke Gonzalez is a 80 y.o.  female with a history of resected left stage II breast carcinoma dating back to 12/27/2012. She was ER IA negative. She was HER-2 positive. She completed adjuvant chemotherapy and Herceptin- for a year. She does self breast exams but denies feeling any new nodules of her right breast.  She denies any abnormality of her left chest wall. She has chronic issues with diarrhea and is been more exacerbated recently. She has macular degeneration and glaucoma involving her right eye and she is having a little more issue with reading. She does have a history of atrial fibrillation with palpitations on occasion but mostly controlled well and she is on Pradaxa. She has essential hypertension and is taking Hyzaar and metoprolol. She has a history of diabetes but has not required any oral hypoglycemics. She continues taking Paxil with a stable mood. TUMOR HISTORY: Left stage IIA (T2 N0 M0), ER/IA negative, Her2 Trudi positive breast cancer, 12/27/12  Saundra was seen in initial oncology consultation on 01/17/13, referred by Dr. Reyes Corbett with regard to a diagnosis of left breast cancer. Her history dates back to just before Thanksgiving when she noticed a lump in her left breast. A mammogram was done early on 11/20/12 that revealed abnormalities in the left breast in the left lower outer quadrant.  She has undergone a left modified radical mastectomy with axillary lymph node dissection by Dr. Dianna Nava on 12/27/12 that reveals a 3.3 cm left breast mass. ER and KS are both negative. Her2 Trudi is 3+ by IHC and amplified by FISH at 6.3. All of the axillary lymph nodes were negative. Margins were clear. Metastatic workup including CT scans of the chest, abdomen and pelvis, MRI of the brain and bone scan were all negative for metastatic disease. Adjuvant chemotherapy including Herceptin was recommended. Her left ventricular ejection fraction on 2D echocardiogram on 12/28/12 was 65%. Chemotherapy including Taxotere, Cytoxan and Herceptin were delivered as outlined below. TREATMENT SUMMARY:  1. Left modified radical mastectomy 12/27/12. 2. Taxotere, Cytoxan and Herceptin chemotherapy, initiated 02/28/13. She completed Taxotere/cytoxan x 6 cycles on 6/28/2013. 3. She continued on weekly herceptin with her final 52nd dose given on 04/04/14. Past Medical History:   Diagnosis Date    A-fib [facility]) 07/28/2006    s/p surgery     Cancer [facility])     breast    Chest pain     Diabetes mellitus ([address]     non-insulin dependent    Diaphoresis     profuse    Family history of early CAD     Gastroesophageal reflux disease     H/O bicuspid aortic valve 5/19/2015    History of blood transfusion     History of phlebitis     Hx of blood clots     Hyperlipidemia     Hypertension     Moderate tricuspid regurgitation 01/12/2016    Osteoarthritis     Valvular heart disease         Past Surgical History:   Procedure Laterality Date    AORTIC VALVE REPLACEMENT  07/28/2006    Aortic valve replacement with 21 mm Janett-Lozano pericardial tissue valve.   Uvaldo Leventhal, M.D.   Sabrina Mera      left masectomy    CARDIOVERSION  01/14/2016    CATARACT REMOVAL      CHOLECYSTECTOMY      COLONOSCOPY      DIAGNOSTIC CARDIAC CATH LAB PROCEDURE  2006    left heart cath, left ventriculography and selective  coronary arteriography    EYE SURGERY      HYSTERECTOMY, TOTAL ABDOMINAL      patient doesn't know if she has her ovaries    JOINT REPLACEMENT      TONSILLECTOMY AND ADENOIDECTOMY      TOTAL KNEE ARTHROPLASTY      bilateral total knee replacement           Current Outpatient Medications:     loperamide (IMODIUM) 2 MG capsule, Take 4 mg by mouth daily, Disp: , Rfl:     losartan-hydrochlorothiazide (HYZAAR) 100-25 MG per tablet, Take 1 tablet by mouth daily, Disp: 90 tablet, Rfl: 1    PARoxetine (PAXIL) 20 MG tablet, TAKE ONE TABLET BY MOUTH EVERY MORNING ** MAY CAUSE DROWSINESS, Disp: 90 tablet, Rfl: 3    metoprolol tartrate (LOPRESSOR) 25 MG tablet, Take 1.5 tablets twice daily, Disp: 270 tablet, Rfl: 1    PRADAXA 150 MG capsule, TAKE ONE CAPSULE BY MOUTH TWICE A DAY, Disp: 60 capsule, Rfl: 5    omeprazole (PRILOSEC) 40 MG delayed release capsule, TAKE ONE CAPSULE BY MOUTH DAILY FOR STOMACH, Disp: 90 capsule, Rfl: 3    denosumab (PROLIA) 60 MG/ML SOLN SC injection, Inject 60 mg into the skin every 6 months, Disp: , Rfl:     Calcium-Vitamin D 600-200 MG-UNIT TABS, Take by mouth daily, Disp: , Rfl:     PROLENSA 0.07 % SOLN, 1 drop daily , Disp: , Rfl:     bimatoprost (LUMIGAN) 0.03 % ophthalmic drops, 1 drop nightly.  , Disp: , Rfl:     losartan-hydrochlorothiazide (HYZAAR) 100-25 MG per tablet, TAKE ONE TABLET BY MOUTH DAILY (Patient not taking: Reported on 11/4/2019), Disp: 30 tablet, Rfl: 3     Allergies   Allergen Reactions    Penicillins      Just does not work    Sulfa Antibiotics Rash       Social History     Tobacco Use    Smoking status: Never Smoker    Smokeless tobacco: Never Used   Substance Use Topics    Alcohol use: No    Drug use: No       Family History   Problem Relation Age of Onset    Arthritis Mother     Heart Disease Mother     High Blood Pressure Mother     Heart Disease Father        Subjective   REVIEW OF SYSTEMS:   Review of Systems   Constitutional: Positive for fatigue (Mild which is stable). Negative for chills, diaphoresis, fever and unexpected weight change.    HENT: Negative Absent. Abdominal:      General: Bowel sounds are normal. There is no distension. Palpations: Abdomen is soft. Tenderness: There is no abdominal tenderness. Musculoskeletal:         General: Deformity (Arthritic changes generalized) present. No tenderness. Skin:     General: Skin is warm and dry. Findings: No rash. Neurological:      Mental Status: She is alert and oriented to person, place, and time. Coordination: Coordination normal.      Gait: Gait abnormal (Uses straight cane). Psychiatric:         Behavior: Behavior normal.         Thought Content: Thought content normal.         VISIT DIAGNOSES  1. History of breast cancer    2. Senile osteoporosis        ASSESSMENT/PLAN:    1. Resected stage IIa left breast carcinoma 2012. Right mammogram performed at [facility] on 4/19/2019 was a BI-RADS 1. CBC today reveals a WBC of 9.5, Hgb 14.8, PLT of 206,000. Physical examination today is unrevealing for any masses or nodules in the right breast.  Left chest wall is without any nodularity. PLAN  Right mammogram has already been arranged for April of this year. CA-15-3    2. Senile osteoporosis. Bone density performed on 4/18/2018 revealed osteoporosis. Calcium 7/24/2019 was 10. She reports her Dr. Claire Cox has started her back on her calcium and vitamin D. PLAN  Prolia today  Arrange bone density 4/2020 -same day as her mammogram  CMP    3. Screening colonoscopy was performed most recently on 10/28/2019 by Dr. Carlo Art. Possible polypoid tissue was excised, pathology revealed only normal colonic mucosa without any polyp changes. Orders Placed This Encounter   Procedures    DEXA Bone Density 2 Sites    Comprehensive Metabolic Panel    Cancer Antigen 15-3        Return in about 6 months (around 7/27/2020) for With Eleonora Bill.      Keturah Edwards PA-C  10:35 AM  1/27/2020

## 2020-01-27 ENCOUNTER — HOSPITAL ENCOUNTER (OUTPATIENT)
Dept: INFUSION THERAPY | Age: 85
Discharge: HOME | End: 2020-01-27
Payer: MEDICARE

## 2020-01-27 ENCOUNTER — OFFICE VISIT (OUTPATIENT)
Dept: HEMATOLOGY | Age: 85
End: 2020-01-27
Payer: MEDICARE

## 2020-01-27 VITALS
BODY MASS INDEX: 34.47 KG/M2 | HEART RATE: 90 BPM | DIASTOLIC BLOOD PRESSURE: 76 MMHG | WEIGHT: 171 LBS | SYSTOLIC BLOOD PRESSURE: 128 MMHG | HEIGHT: 59 IN | OXYGEN SATURATION: 96 %

## 2020-01-27 DIAGNOSIS — C50.919 MALIGNANT NEOPLASM OF FEMALE BREAST, UNSPECIFIED ESTROGEN RECEPTOR STATUS, UNSPECIFIED LATERALITY, UNSPECIFIED SITE OF BREAST (HCC): Primary | ICD-10-CM

## 2020-01-27 DIAGNOSIS — Z85.3 HISTORY OF BREAST CANCER: ICD-10-CM

## 2020-01-27 PROCEDURE — G8399 PT W/DXA RESULTS DOCUMENT: HCPCS | Performed by: PHYSICIAN ASSISTANT

## 2020-01-27 PROCEDURE — 1090F PRES/ABSN URINE INCON ASSESS: CPT | Performed by: PHYSICIAN ASSISTANT

## 2020-01-27 PROCEDURE — G8482 FLU IMMUNIZE ORDER/ADMIN: HCPCS | Performed by: PHYSICIAN ASSISTANT

## 2020-01-27 PROCEDURE — 85025 COMPLETE CBC W/AUTO DIFF WBC: CPT

## 2020-01-27 PROCEDURE — 4040F PNEUMOC VAC/ADMIN/RCVD: CPT | Performed by: PHYSICIAN ASSISTANT

## 2020-01-27 PROCEDURE — 1036F TOBACCO NON-USER: CPT | Performed by: PHYSICIAN ASSISTANT

## 2020-01-27 PROCEDURE — 6360000002 HC RX W HCPCS: Performed by: PHYSICIAN ASSISTANT

## 2020-01-27 PROCEDURE — 36415 COLL VENOUS BLD VENIPUNCTURE: CPT

## 2020-01-27 PROCEDURE — 99213 OFFICE O/P EST LOW 20 MIN: CPT | Performed by: PHYSICIAN ASSISTANT

## 2020-01-27 PROCEDURE — G8417 CALC BMI ABV UP PARAM F/U: HCPCS | Performed by: PHYSICIAN ASSISTANT

## 2020-01-27 PROCEDURE — G8427 DOCREV CUR MEDS BY ELIG CLIN: HCPCS | Performed by: PHYSICIAN ASSISTANT

## 2020-01-27 PROCEDURE — 86300 IMMUNOASSAY TUMOR CA 15-3: CPT

## 2020-01-27 PROCEDURE — 1123F ACP DISCUSS/DSCN MKR DOCD: CPT | Performed by: PHYSICIAN ASSISTANT

## 2020-01-27 PROCEDURE — 96372 THER/PROPH/DIAG INJ SC/IM: CPT

## 2020-01-27 PROCEDURE — 80053 COMPREHEN METABOLIC PANEL: CPT

## 2020-01-27 RX ORDER — DIPHENHYDRAMINE HYDROCHLORIDE 50 MG/ML
50 INJECTION INTRAMUSCULAR; INTRAVENOUS PRN
Status: CANCELLED | OUTPATIENT
Start: 2020-07-20

## 2020-01-27 RX ORDER — METHYLPREDNISOLONE SODIUM SUCCINATE 125 MG/2ML
125 INJECTION, POWDER, LYOPHILIZED, FOR SOLUTION INTRAMUSCULAR; INTRAVENOUS PRN
Status: CANCELLED | OUTPATIENT
Start: 2020-07-20

## 2020-01-27 RX ADMIN — DENOSUMAB 60 MG: 60 INJECTION SUBCUTANEOUS at 10:37

## 2020-01-27 ASSESSMENT — ENCOUNTER SYMPTOMS
COUGH: 0
TROUBLE SWALLOWING: 0
BACK PAIN: 1
SHORTNESS OF BREATH: 0
CONSTIPATION: 0
ABDOMINAL DISTENTION: 0
WHEEZING: 0
BLOOD IN STOOL: 0
SORE THROAT: 0
DIARRHEA: 1
VOICE CHANGE: 0
PHOTOPHOBIA: 0
COLOR CHANGE: 0
NAUSEA: 0
VOMITING: 0
EYE ITCHING: 0
ABDOMINAL PAIN: 0
EYE DISCHARGE: 0

## 2020-02-03 RX ORDER — LOSARTAN POTASSIUM AND HYDROCHLOROTHIAZIDE 25; 100 MG/1; MG/1
1 TABLET ORAL DAILY
Qty: 90 TABLET | Refills: 3 | Status: SHIPPED | OUTPATIENT
Start: 2020-02-03 | End: 2020-02-10

## 2020-02-10 RX ORDER — LOSARTAN POTASSIUM AND HYDROCHLOROTHIAZIDE 25; 100 MG/1; MG/1
TABLET ORAL
Qty: 30 TABLET | Refills: 3 | Status: SHIPPED | OUTPATIENT
Start: 2020-02-10 | End: 2020-03-24

## 2020-02-10 RX ORDER — ATENOLOL 100 MG/1
TABLET ORAL
Qty: 60 CAPSULE | Refills: 5 | Status: SHIPPED | OUTPATIENT
Start: 2020-02-10 | End: 2020-09-14

## 2020-03-24 RX ORDER — LOSARTAN POTASSIUM AND HYDROCHLOROTHIAZIDE 25; 100 MG/1; MG/1
TABLET ORAL
Qty: 30 TABLET | Refills: 3 | Status: SHIPPED
Start: 2020-03-24 | End: 2020-06-20 | Stop reason: ALTCHOICE

## 2020-04-15 RX ORDER — OMEPRAZOLE 40 MG/1
CAPSULE, DELAYED RELEASE ORAL
Qty: 90 CAPSULE | Refills: 3 | Status: SHIPPED | OUTPATIENT
Start: 2020-04-15 | End: 2021-06-03 | Stop reason: SDUPTHER

## 2020-06-16 RX ORDER — PAROXETINE HYDROCHLORIDE 20 MG/1
TABLET, FILM COATED ORAL
Qty: 90 TABLET | Refills: 3 | Status: SHIPPED | OUTPATIENT
Start: 2020-06-16 | End: 2021-05-05 | Stop reason: SDUPTHER

## 2020-06-17 ENCOUNTER — OFFICE VISIT (OUTPATIENT)
Dept: PRIMARY CARE CLINIC | Age: 85
End: 2020-06-17
Payer: MEDICARE

## 2020-06-17 VITALS
HEIGHT: 59 IN | TEMPERATURE: 97.6 F | BODY MASS INDEX: 33.14 KG/M2 | SYSTOLIC BLOOD PRESSURE: 122 MMHG | HEART RATE: 83 BPM | DIASTOLIC BLOOD PRESSURE: 71 MMHG | RESPIRATION RATE: 18 BRPM | WEIGHT: 164.4 LBS | OXYGEN SATURATION: 97 %

## 2020-06-17 LAB
ALBUMIN SERPL-MCNC: 3.7 G/DL (ref 3.5–5.2)
ALP BLD-CCNC: 55 U/L (ref 35–104)
ALT SERPL-CCNC: 10 U/L (ref 5–33)
ANION GAP SERPL CALCULATED.3IONS-SCNC: 17 MMOL/L (ref 7–19)
AST SERPL-CCNC: 19 U/L (ref 5–32)
BILIRUB SERPL-MCNC: 0.3 MG/DL (ref 0.2–1.2)
BUN BLDV-MCNC: 19 MG/DL (ref 8–23)
CALCIUM SERPL-MCNC: 8.6 MG/DL (ref 8.8–10.2)
CHLORIDE BLD-SCNC: 100 MMOL/L (ref 98–111)
CO2: 21 MMOL/L (ref 22–29)
CREAT SERPL-MCNC: 1.3 MG/DL (ref 0.5–0.9)
GFR NON-AFRICAN AMERICAN: 39
GLUCOSE BLD-MCNC: 155 MG/DL (ref 74–109)
HBA1C MFR BLD: 6.1 % (ref 4–6)
POTASSIUM SERPL-SCNC: 3.3 MMOL/L (ref 3.5–5)
SODIUM BLD-SCNC: 138 MMOL/L (ref 136–145)
TOTAL PROTEIN: 6.5 G/DL (ref 6.6–8.7)

## 2020-06-17 PROCEDURE — 4040F PNEUMOC VAC/ADMIN/RCVD: CPT | Performed by: FAMILY MEDICINE

## 2020-06-17 PROCEDURE — 1123F ACP DISCUSS/DSCN MKR DOCD: CPT | Performed by: FAMILY MEDICINE

## 2020-06-17 PROCEDURE — 1090F PRES/ABSN URINE INCON ASSESS: CPT | Performed by: FAMILY MEDICINE

## 2020-06-17 PROCEDURE — G8399 PT W/DXA RESULTS DOCUMENT: HCPCS | Performed by: FAMILY MEDICINE

## 2020-06-17 PROCEDURE — G8417 CALC BMI ABV UP PARAM F/U: HCPCS | Performed by: FAMILY MEDICINE

## 2020-06-17 PROCEDURE — G8427 DOCREV CUR MEDS BY ELIG CLIN: HCPCS | Performed by: FAMILY MEDICINE

## 2020-06-17 PROCEDURE — 36415 COLL VENOUS BLD VENIPUNCTURE: CPT | Performed by: FAMILY MEDICINE

## 2020-06-17 PROCEDURE — 1036F TOBACCO NON-USER: CPT | Performed by: FAMILY MEDICINE

## 2020-06-17 PROCEDURE — 99213 OFFICE O/P EST LOW 20 MIN: CPT | Performed by: FAMILY MEDICINE

## 2020-06-17 ASSESSMENT — PATIENT HEALTH QUESTIONNAIRE - PHQ9
1. LITTLE INTEREST OR PLEASURE IN DOING THINGS: 1
2. FEELING DOWN, DEPRESSED OR HOPELESS: 1
SUM OF ALL RESPONSES TO PHQ9 QUESTIONS 1 & 2: 2
SUM OF ALL RESPONSES TO PHQ QUESTIONS 1-9: 2
SUM OF ALL RESPONSES TO PHQ QUESTIONS 1-9: 2

## 2020-06-18 ENCOUNTER — TELEPHONE (OUTPATIENT)
Dept: PRIMARY CARE CLINIC | Age: 85
End: 2020-06-18

## 2020-06-18 RX ORDER — COLESEVELAM 180 1/1
625 TABLET ORAL 2 TIMES DAILY WITH MEALS
Qty: 60 TABLET | Refills: 5 | Status: SHIPPED | OUTPATIENT
Start: 2020-06-18 | End: 2021-01-13 | Stop reason: SDUPTHER

## 2020-06-18 NOTE — TELEPHONE ENCOUNTER
I sent in the [facility]. Please tell her that it said her insurance did not cover it and I have no idea how expensive it is. I wanted to send in [facility] but she said she could not drink the liquid.

## 2020-06-19 NOTE — TELEPHONE ENCOUNTER
She told me that. That is why called in the WelChol.   I was just warning her that they may be expensive

## 2020-06-20 RX ORDER — LOSARTAN POTASSIUM 100 MG/1
100 TABLET ORAL DAILY
Qty: 30 TABLET | Refills: 5 | Status: SHIPPED | OUTPATIENT
Start: 2020-06-20 | End: 2021-05-05 | Stop reason: DRUGHIGH

## 2020-06-21 ASSESSMENT — ENCOUNTER SYMPTOMS
RESPIRATORY NEGATIVE: 1
ANAL BLEEDING: 0
ABDOMINAL PAIN: 0
ABDOMINAL DISTENTION: 0
DIARRHEA: 1
BLOOD IN STOOL: 0

## 2020-06-21 NOTE — PROGRESS NOTES
of current or ex partner: Not on file     Emotionally abused: Not on file     Physically abused: Not on file     Forced sexual activity: Not on file   Other Topics Concern    Not on file   Social History Narrative    Not on file       Review of Systems   Constitutional: Negative. Negative for unexpected weight change. Eyes: Negative for visual disturbance. Respiratory: Negative. Cardiovascular: Negative. Gastrointestinal: Positive for diarrhea. Negative for abdominal distention, abdominal pain, anal bleeding and blood in stool. Endocrine: Negative for polydipsia, polyphagia and polyuria. Genitourinary: Negative for dysuria. Musculoskeletal: Negative. Neurological: Negative for light-headedness and numbness. Hematological: Negative. Psychiatric/Behavioral: Negative. OBJECTIVE:    Wt Readings from Last 3 Encounters:   06/17/20 164 lb 6.4 oz (74.6 kg)   01/27/20 171 lb (77.6 kg)   11/04/19 177 lb (80.3 kg)       /71   Pulse 83   Temp 97.6 °F (36.4 °C)   Resp 18   Ht 4' 11\" (1.499 m)   Wt 164 lb 6.4 oz (74.6 kg)   SpO2 97%   BMI 33.20 kg/m²     Physical Exam  Neurological:      Comments: Sensory exam of the foot is normal, tested with the monofilament. Good pulses, no lesions or ulcers, good peripheral pulses. ASSESSMENT:    1. Essential hypertension    2. Type 2 diabetes mellitus without complication, without long-term current use of insulin ([address]    3. Chronic atrial fibrillation    4. Malignant neoplasm of female breast, unspecified estrogen receptor status, unspecified laterality, unspecified site of breast ([address]          PLAN:    MEDICATIONS:  No orders of the defined types were placed in this encounter. I am going to try WelChol tablets. These may be expensive and her insurance may not pay for it. Continue current medications. We will refill when needed. I refilled her losartan. Continue to monitor blood pressure.   ORDERS:  Orders Placed This

## 2020-07-22 ENCOUNTER — NURSE ONLY (OUTPATIENT)
Dept: PRIMARY CARE CLINIC | Age: 85
End: 2020-07-22
Payer: MEDICARE

## 2020-07-22 LAB
ANION GAP SERPL CALCULATED.3IONS-SCNC: 16 MMOL/L (ref 7–19)
BUN BLDV-MCNC: 22 MG/DL (ref 8–23)
CALCIUM SERPL-MCNC: 9.8 MG/DL (ref 8.8–10.2)
CHLORIDE BLD-SCNC: 101 MMOL/L (ref 98–111)
CO2: 24 MMOL/L (ref 22–29)
CREAT SERPL-MCNC: 1.1 MG/DL (ref 0.5–0.9)
GFR AFRICAN AMERICAN: 57
GFR NON-AFRICAN AMERICAN: 47
GLUCOSE BLD-MCNC: 140 MG/DL (ref 74–109)
POTASSIUM SERPL-SCNC: 4 MMOL/L (ref 3.5–5)
SODIUM BLD-SCNC: 141 MMOL/L (ref 136–145)

## 2020-07-22 PROCEDURE — 36415 COLL VENOUS BLD VENIPUNCTURE: CPT | Performed by: FAMILY MEDICINE

## 2020-07-24 NOTE — PROGRESS NOTES
Progress Note      Pt Name: Anival Johnson: 1935  MRN: [id number]    Date of evaluation: 7/27/2020  History Obtained From:  patient, electronic medical record    CHIEF COMPLAINT:    Chief Complaint   Patient presents with    Breast Cancer     History of breast cancer      Current active problems  Resected Stage II left breast cancer   Senile osteoperosis    HISTORY OF PRESENT ILLNESS:    Vida Nuñez is a 80 y.o.  female with a history of resected left stage II breast carcinoma dating back to 12/27/2012. She was ER DC negative. She was HER-2 positive. She completed adjuvant chemotherapy and Herceptin- for a year. She does self breast exams but denies feeling any new nodules of her right breast.  She denies any abnormality of her left chest wall. She has basically been self quarantined since the start of COVID-19. She denies any new orthopnea or paroxysmal nocturnal dyspnea, dyspnea on exertion. She is here to consider getting Prolia. She denies any new jaw pain. She reports that she did run out of her calcium and vitamin D and is not taking that at present, needs a refill. She will be relocating to [place] with her daughter in the near future. However, she wants to continue to return to [place] for her Prolia and monitoring of her osteoporosis, breast cancer. She continues to have some chronic diarrhea issues but that has actually been more stable recently. She has history of atrial fibrillation without any exacerbations recently but continues on Pradaxa. She does have hypertension and continues take metoprolol and Hyzaar. She has not had any exacerbation of her blood sugars and continues to basically control her diabetes with diet.         TUMOR HISTORY: Left stage IIA (T2 N0 M0), ER/DC negative, Her2 Trudi positive breast cancer, 12/27/12  Saundra was seen in initial oncology consultation on 01/17/13, referred by Dr. Magalie Erwin with regard to a diagnosis of left breast cancer. Her history dates back to just before Thanksgiving when she noticed a lump in her left breast. A mammogram was done early on 11/20/12 that revealed abnormalities in the left breast in the left lower outer quadrant. She has undergone a left modified radical mastectomy with axillary lymph node dissection by Dr. Marylee Abide on 12/27/12 that reveals a 3.3 cm left breast mass. ER and ID are both negative. Her2 Trudi is 3+ by IHC and amplified by FISH at 6.3. All of the axillary lymph nodes were negative. Margins were clear. Metastatic workup including CT scans of the chest, abdomen and pelvis, MRI of the brain and bone scan were all negative for metastatic disease. Adjuvant chemotherapy including Herceptin was recommended. Her left ventricular ejection fraction on 2D echocardiogram on 12/28/12 was 65%. Chemotherapy including Taxotere, Cytoxan and Herceptin were delivered as outlined below. TREATMENT SUMMARY:  1. Left modified radical mastectomy 12/27/12. 2. Taxotere, Cytoxan and Herceptin chemotherapy, initiated 02/28/13. She completed Taxotere/cytoxan x 6 cycles on 6/28/2013. 3. She continued on weekly herceptin with her final 52nd dose given on 04/04/14. Past Medical History:   Diagnosis Date    A-fib [facility]) 07/28/2006    s/p surgery     Cancer [facility])     breast    Chest pain     Diabetes mellitus [address] [address]     non-insulin dependent    Diaphoresis     profuse    Family history of early CAD     Gastroesophageal reflux disease     H/O bicuspid aortic valve 5/19/2015    History of blood transfusion     History of phlebitis     Hx of blood clots     Hyperlipidemia     Hypertension     Moderate tricuspid regurgitation 01/12/2016    Osteoarthritis     Valvular heart disease         Past Surgical History:   Procedure Laterality Date    AORTIC VALVE REPLACEMENT  07/28/2006    Aortic valve replacement with 21 mm Janett-Lozano pericardial tissue valve.   Flaco Lawrence M.D.   Jessica Rivera APPENDECTOMY      AV FISTULA REPAIR      BREAST SURGERY      left masectomy    CARDIOVERSION  01/14/2016    CATARACT REMOVAL      CHOLECYSTECTOMY      COLONOSCOPY      DIAGNOSTIC CARDIAC CATH LAB PROCEDURE  2006    left heart cath, left ventriculography and selective  coronary arteriography    EYE SURGERY      HYSTERECTOMY, TOTAL ABDOMINAL      patient doesn't know if she has her ovaries    JOINT REPLACEMENT      TONSILLECTOMY AND ADENOIDECTOMY      TOTAL KNEE ARTHROPLASTY      bilateral total knee replacement           Current Outpatient Medications:     Calcium-Vitamin D 600-200 MG-UNIT TABS, Take 1 tablet by mouth daily, Disp: 90 tablet, Rfl: 3    losartan (COZAAR) 100 MG tablet, Take 1 tablet by mouth daily For high blood pressure, Disp: 30 tablet, Rfl: 5    colesevelam (WELCHOL) 625 MG tablet, Take 1 tablet by mouth 2 times daily (with meals) For diarrhea from gallbladder removal, Disp: 60 tablet, Rfl: 5    PARoxetine (PAXIL) 20 MG tablet, TAKE ONE TABLET BY MOUTH EVERY MORNING ** MAY CAUSE DROWSINESS, Disp: 90 tablet, Rfl: 3    omeprazole (PRILOSEC) 40 MG delayed release capsule, TAKE ONE CAPSULE BY MOUTH DAILY FOR STOMACH, Disp: 90 capsule, Rfl: 3    PRADAXA 150 MG capsule, TAKE ONE CAPSULE BY MOUTH TWICE A DAY, Disp: 60 capsule, Rfl: 5    loperamide (IMODIUM) 2 MG capsule, Take 4 mg by mouth daily, Disp: , Rfl:     metoprolol tartrate (LOPRESSOR) 25 MG tablet, Take 1.5 tablets twice daily, Disp: 270 tablet, Rfl: 1    denosumab (PROLIA) 60 MG/ML SOLN SC injection, Inject 60 mg into the skin every 6 months, Disp: , Rfl:     PROLENSA 0.07 % SOLN, 1 drop daily , Disp: , Rfl:     bimatoprost (LUMIGAN) 0.03 % ophthalmic drops, 1 drop nightly.  , Disp: , Rfl:      Allergies   Allergen Reactions    Penicillins      Just does not work    Sulfa Antibiotics Rash       Social History     Tobacco Use    Smoking status: Never Smoker    Smokeless tobacco: Never Used   Substance Use Topics    Alcohol use: No    Drug use: No       Family History   Problem Relation Age of Onset    Arthritis Mother     Heart Disease Mother     High Blood Pressure Mother     Heart Disease Father        Subjective   REVIEW OF SYSTEMS:   Review of Systems   Constitutional: Positive for fatigue (Mild which is stable). Negative for chills, diaphoresis, fever and unexpected weight change. HENT: Negative for mouth sores, nosebleeds, sore throat, trouble swallowing and voice change. Eyes: Negative for photophobia, discharge and itching. Respiratory: Negative for cough, shortness of breath and wheezing. Cardiovascular: Positive for palpitations (On occasion but rare). Negative for chest pain and leg swelling. Gastrointestinal: Positive for diarrhea (More controlled). Negative for abdominal distention, abdominal pain, blood in stool, constipation, nausea and vomiting. Endocrine: Negative for cold intolerance, heat intolerance, polydipsia and polyuria. Genitourinary: Negative for difficulty urinating, dysuria, hematuria and urgency. Musculoskeletal: Positive for arthralgias and back pain. Negative for joint swelling and myalgias. Skin: Negative for color change and rash. Neurological: Negative for dizziness, tremors, seizures, syncope and light-headedness. Hematological: Negative for adenopathy. Does not bruise/bleed easily. Psychiatric/Behavioral: Negative for behavioral problems and suicidal ideas. The patient is not nervous/anxious. All other systems reviewed and are negative. Objective   BP (!) 80/52   Pulse 60   Temp 97.5 °F (36.4 °C)   Ht 4' 11\" (1.499 m)   Wt 160 lb 9.6 oz (72.8 kg)   SpO2 93%   BMI 32.44 kg/m²     PHYSICAL EXAM:  Physical Exam  Constitutional:       Appearance: She is well-developed. Comments: Chronically ill-appearing   HENT:      Head: Normocephalic and atraumatic. Eyes:      General: No scleral icterus.      Conjunctiva/sclera: Conjunctivae normal.   Neck: 8. 8    PLAN  CA-15-3    2. Senile osteoporosis. Bone density performed on 4/18/2018 revealed osteoporosis. Bone density this morning on 7/27/2020 at [address] revealed osteopenia-improvement with Prolia. PLAN  Prolia today   CMP  A refill on her calcium/vitamin D was electronically forwarded to her pharmacy. I gave her a 3-month supply. 3.  Colon cancer screening. Screening colonoscopy was performed most recently on 10/28/2019 by Dr. Ekaterina Cote. Possible polypoid tissue was excised, pathology revealed only normal colonic mucosa without any polyp changes. 4.  Cervical cancer screening. Prior ROSETTA       She will soon be relocating with her daughter in [place]. However at present she is wanting to return to this area for continued surveillance and Prolia injections. Orders Placed This Encounter   Procedures    Comprehensive Metabolic Panel    Cancer Antigen 15-3        Return in about 6 months (around 1/27/2021) for With Yadira Spear and Prolia.      Gian Winn PA-C  11:52 AM  7/27/2020

## 2020-07-27 ENCOUNTER — OFFICE VISIT (OUTPATIENT)
Dept: HEMATOLOGY | Age: 85
End: 2020-07-27
Payer: MEDICARE

## 2020-07-27 ENCOUNTER — HOSPITAL ENCOUNTER (OUTPATIENT)
Dept: INFUSION THERAPY | Age: 85
Discharge: HOME | End: 2020-07-27
Payer: MEDICARE

## 2020-07-27 ENCOUNTER — HOSPITAL ENCOUNTER (OUTPATIENT)
Dept: WOMENS IMAGING | Age: 85
Discharge: HOME | End: 2020-07-27
Payer: MEDICARE

## 2020-07-27 VITALS
TEMPERATURE: 97.5 F | HEART RATE: 60 BPM | WEIGHT: 160.6 LBS | OXYGEN SATURATION: 93 % | BODY MASS INDEX: 32.38 KG/M2 | HEIGHT: 59 IN | DIASTOLIC BLOOD PRESSURE: 52 MMHG | SYSTOLIC BLOOD PRESSURE: 80 MMHG

## 2020-07-27 DIAGNOSIS — C50.919 MALIGNANT NEOPLASM OF FEMALE BREAST, UNSPECIFIED ESTROGEN RECEPTOR STATUS, UNSPECIFIED LATERALITY, UNSPECIFIED SITE OF BREAST (HCC): Primary | ICD-10-CM

## 2020-07-27 LAB
ALBUMIN SERPL-MCNC: 4.2 G/DL (ref 3.5–5.2)
ALP BLD-CCNC: 65 U/L (ref 35–104)
ALT SERPL-CCNC: 11 U/L (ref 9–52)
ANION GAP SERPL CALCULATED.3IONS-SCNC: 8 MMOL/L (ref 7–19)
AST SERPL-CCNC: 26 U/L (ref 14–36)
BASOPHILS ABSOLUTE: 0.09 K/UL (ref 0.01–0.08)
BASOPHILS RELATIVE PERCENT: 1 % (ref 0.1–1.2)
BILIRUB SERPL-MCNC: 0.7 MG/DL (ref 0.2–1.3)
BUN BLDV-MCNC: 26 MG/DL (ref 7–17)
CA 15-3: 8.9 U/ML (ref 0–35)
CALCIUM SERPL-MCNC: 9.8 MG/DL (ref 8.4–10.2)
CHLORIDE BLD-SCNC: 106 MMOL/L (ref 98–111)
CO2: 27 MMOL/L (ref 22–29)
CREAT SERPL-MCNC: 1.2 MG/DL (ref 0.5–1)
EOSINOPHILS ABSOLUTE: 0.28 K/UL (ref 0.04–0.54)
EOSINOPHILS RELATIVE PERCENT: 3 % (ref 0.7–7)
GFR NON-AFRICAN AMERICAN: 43
GLOBULIN: 2.7 G/DL
GLUCOSE BLD-MCNC: 107 MG/DL (ref 74–106)
HCT VFR BLD CALC: 47.7 % (ref 34.1–44.9)
HEMOGLOBIN: 15.5 G/DL (ref 11.2–15.7)
LYMPHOCYTES ABSOLUTE: 2.77 K/UL (ref 1.18–3.74)
LYMPHOCYTES RELATIVE PERCENT: 30.1 % (ref 19.3–53.1)
MCH RBC QN AUTO: 31.3 PG (ref 25.6–32.2)
MCHC RBC AUTO-ENTMCNC: 32.5 G/DL (ref 32.3–35.5)
MCV RBC AUTO: 96.2 FL (ref 79.4–94.8)
MONOCYTES ABSOLUTE: 0.81 K/UL (ref 0.24–0.82)
MONOCYTES RELATIVE PERCENT: 8.8 % (ref 4.7–12.5)
NEUTROPHILS ABSOLUTE: 5.26 K/UL (ref 1.56–6.13)
NEUTROPHILS RELATIVE PERCENT: 57.1 % (ref 34–71.1)
PDW BLD-RTO: 12.4 % (ref 11.7–14.4)
PLATELET # BLD: 154 K/UL (ref 182–369)
PMV BLD AUTO: 10.2 FL (ref 7.4–10.4)
POTASSIUM SERPL-SCNC: 3.9 MMOL/L (ref 3.5–5.1)
RBC # BLD: 4.96 M/UL (ref 3.93–5.22)
SODIUM BLD-SCNC: 141 MMOL/L (ref 137–145)
TOTAL PROTEIN: 7 G/DL (ref 6.3–8.2)
WBC # BLD: 9.21 K/UL (ref 3.98–10.04)

## 2020-07-27 PROCEDURE — G8399 PT W/DXA RESULTS DOCUMENT: HCPCS | Performed by: PHYSICIAN ASSISTANT

## 2020-07-27 PROCEDURE — 77080 DXA BONE DENSITY AXIAL: CPT

## 2020-07-27 PROCEDURE — 77063 BREAST TOMOSYNTHESIS BI: CPT

## 2020-07-27 PROCEDURE — 80053 COMPREHEN METABOLIC PANEL: CPT

## 2020-07-27 PROCEDURE — 86300 IMMUNOASSAY TUMOR CA 15-3: CPT

## 2020-07-27 PROCEDURE — 6360000002 HC RX W HCPCS: Performed by: PHYSICIAN ASSISTANT

## 2020-07-27 PROCEDURE — 96372 THER/PROPH/DIAG INJ SC/IM: CPT

## 2020-07-27 PROCEDURE — 1036F TOBACCO NON-USER: CPT | Performed by: PHYSICIAN ASSISTANT

## 2020-07-27 PROCEDURE — 99213 OFFICE O/P EST LOW 20 MIN: CPT | Performed by: PHYSICIAN ASSISTANT

## 2020-07-27 PROCEDURE — 1090F PRES/ABSN URINE INCON ASSESS: CPT | Performed by: PHYSICIAN ASSISTANT

## 2020-07-27 PROCEDURE — G8427 DOCREV CUR MEDS BY ELIG CLIN: HCPCS | Performed by: PHYSICIAN ASSISTANT

## 2020-07-27 PROCEDURE — 85025 COMPLETE CBC W/AUTO DIFF WBC: CPT

## 2020-07-27 PROCEDURE — G8417 CALC BMI ABV UP PARAM F/U: HCPCS | Performed by: PHYSICIAN ASSISTANT

## 2020-07-27 PROCEDURE — 1123F ACP DISCUSS/DSCN MKR DOCD: CPT | Performed by: PHYSICIAN ASSISTANT

## 2020-07-27 PROCEDURE — 4040F PNEUMOC VAC/ADMIN/RCVD: CPT | Performed by: PHYSICIAN ASSISTANT

## 2020-07-27 RX ORDER — METHYLPREDNISOLONE SODIUM SUCCINATE 125 MG/2ML
125 INJECTION, POWDER, LYOPHILIZED, FOR SOLUTION INTRAMUSCULAR; INTRAVENOUS PRN
Status: CANCELLED | OUTPATIENT
Start: 2021-01-25

## 2020-07-27 RX ORDER — DIPHENHYDRAMINE HYDROCHLORIDE 50 MG/ML
50 INJECTION INTRAMUSCULAR; INTRAVENOUS PRN
Status: CANCELLED | OUTPATIENT
Start: 2021-01-25

## 2020-07-27 RX ORDER — DIPHENHYDRAMINE HYDROCHLORIDE 50 MG/ML
50 INJECTION INTRAMUSCULAR; INTRAVENOUS PRN
Status: CANCELLED | OUTPATIENT
Start: 2020-07-27

## 2020-07-27 RX ORDER — METHYLPREDNISOLONE SODIUM SUCCINATE 125 MG/2ML
125 INJECTION, POWDER, LYOPHILIZED, FOR SOLUTION INTRAMUSCULAR; INTRAVENOUS PRN
Status: CANCELLED | OUTPATIENT
Start: 2020-07-27

## 2020-07-27 RX ADMIN — DENOSUMAB 60 MG: 60 INJECTION SUBCUTANEOUS at 10:50

## 2020-07-27 ASSESSMENT — ENCOUNTER SYMPTOMS
VOMITING: 0
VOICE CHANGE: 0
CONSTIPATION: 0
WHEEZING: 0
EYE DISCHARGE: 0
PHOTOPHOBIA: 0
EYE ITCHING: 0
COUGH: 0
BACK PAIN: 1
ABDOMINAL PAIN: 0
DIARRHEA: 1
TROUBLE SWALLOWING: 0
BLOOD IN STOOL: 0
ABDOMINAL DISTENTION: 0
SHORTNESS OF BREATH: 0
NAUSEA: 0
COLOR CHANGE: 0
SORE THROAT: 0

## 2020-09-14 ENCOUNTER — OFFICE VISIT (OUTPATIENT)
Dept: PRIMARY CARE CLINIC | Age: 85
End: 2020-09-14
Payer: MEDICARE

## 2020-09-14 VITALS
RESPIRATION RATE: 18 BRPM | HEIGHT: 59 IN | HEART RATE: 74 BPM | OXYGEN SATURATION: 96 % | WEIGHT: 161.6 LBS | BODY MASS INDEX: 32.58 KG/M2 | TEMPERATURE: 97.3 F

## 2020-09-14 PROCEDURE — 99213 OFFICE O/P EST LOW 20 MIN: CPT | Performed by: FAMILY MEDICINE

## 2020-09-14 PROCEDURE — G8427 DOCREV CUR MEDS BY ELIG CLIN: HCPCS | Performed by: FAMILY MEDICINE

## 2020-09-14 PROCEDURE — G8417 CALC BMI ABV UP PARAM F/U: HCPCS | Performed by: FAMILY MEDICINE

## 2020-09-14 PROCEDURE — 1123F ACP DISCUSS/DSCN MKR DOCD: CPT | Performed by: FAMILY MEDICINE

## 2020-09-14 PROCEDURE — 1090F PRES/ABSN URINE INCON ASSESS: CPT | Performed by: FAMILY MEDICINE

## 2020-09-14 PROCEDURE — 1036F TOBACCO NON-USER: CPT | Performed by: FAMILY MEDICINE

## 2020-09-14 PROCEDURE — 4040F PNEUMOC VAC/ADMIN/RCVD: CPT | Performed by: FAMILY MEDICINE

## 2020-09-14 PROCEDURE — G8399 PT W/DXA RESULTS DOCUMENT: HCPCS | Performed by: FAMILY MEDICINE

## 2020-09-14 RX ORDER — ACETAMINOPHEN 325 MG/1
650 TABLET ORAL EVERY 6 HOURS PRN
COMMUNITY
End: 2021-10-21

## 2020-09-14 RX ORDER — GABAPENTIN 100 MG/1
100 CAPSULE ORAL PRN
COMMUNITY
Start: 2020-09-09 | End: 2021-05-05 | Stop reason: SDUPTHER

## 2020-09-14 RX ORDER — ATORVASTATIN CALCIUM 80 MG/1
80 TABLET, FILM COATED ORAL NIGHTLY
COMMUNITY
Start: 2020-09-09 | End: 2021-09-10

## 2020-09-17 ENCOUNTER — TELEPHONE (OUTPATIENT)
Dept: PRIMARY CARE CLINIC | Age: 85
End: 2020-09-17

## 2020-09-17 NOTE — TELEPHONE ENCOUNTER
[facility] Nurse states she was not able to get BP or pulse on right arm and leg. Also right arm did not feel warm to touch. Pt states we were not able to get BP at visit here Monday.

## 2020-09-19 ASSESSMENT — ENCOUNTER SYMPTOMS
GASTROINTESTINAL NEGATIVE: 1
RESPIRATORY NEGATIVE: 1

## 2020-09-19 NOTE — PROGRESS NOTES
SUBJECTIVE:    Hank Zamora is 80 y. o.female who comes in complaining of Follow-up (from the [facility] )   . HPI: Mrs. Jacques Tamez is brought in by her daughter with whom she is now living in [place] after having a TIA on 9/7/2020. She was treated at [facility]. A lot of those records are not yet in care everywhere. She is doing better now. There is some hope that they will be moving back to [place] in the next year or so. She was seen at a local hospital and then transferred to [facility]. Supposedly the stroke was in the right basal ganglia. She is a diabetic. They are monitoring her sugars and her blood pressure. She denies any polyuria polyphagia or polydipsia. No episodes of hypoglycemia. She is seen by Dr. Lizeth donovan for her breast cancer. Allergies   Allergen Reactions    Penicillins      Just does not work    Sulfa Antibiotics Rash       Social History     Socioeconomic History    Marital status:       Spouse name: Not on file    Number of children: 3    Years of education: Not on file    Highest education level: Not on file   Occupational History    Occupation: retired   Social Needs    Financial resource strain: Not on file    Food insecurity     Worry: Not on file     Inability: Not on file   [employer] needs     Medical: Not on file     Non-medical: Not on file   Tobacco Use    Smoking status: Never Smoker    Smokeless tobacco: Never Used   Substance and Sexual Activity    Alcohol use: No    Drug use: No    Sexual activity: Not on file     Comment:    Lifestyle    Physical activity     Days per week: Not on file     Minutes per session: Not on file    Stress: Not on file   Relationships    Social connections     Talks on phone: Not on file     Gets together: Not on file     Attends Spiritism service: Not on file     Active member of club or organization: Not on file     Attends meetings of clubs or organizations: Not on file     Relationship status: Not on file    Intimate partner violence     Fear of current or ex partner: Not on file     Emotionally abused: Not on file     Physically abused: Not on file     Forced sexual activity: Not on file   Other Topics Concern    Not on file   Social History Narrative    Not on file       Review of Systems   Constitutional: Positive for activity change and fatigue. Negative for unexpected weight change. Eyes: Negative for visual disturbance. Respiratory: Negative. Cardiovascular: Negative. Gastrointestinal: Negative. Endocrine: Negative for polydipsia, polyphagia and polyuria. Genitourinary: Negative for dysuria. Musculoskeletal: Positive for arthralgias. Neurological: Positive for weakness. Negative for seizures, speech difficulty, light-headedness and numbness. Hematological: Bruises/bleeds easily. Psychiatric/Behavioral: Negative. OBJECTIVE:    Wt Readings from Last 3 Encounters:   09/14/20 161 lb 9.6 oz (73.3 kg)   07/27/20 160 lb 9.6 oz (72.8 kg)   06/17/20 164 lb 6.4 oz (74.6 kg)       Pulse 74   Temp 97.3 °F (36.3 °C)   Resp 18   Ht 4' 11\" (1.499 m)   Wt 161 lb 9.6 oz (73.3 kg)   SpO2 96%   BMI 32.64 kg/m²     Physical Exam  Vitals signs and nursing note reviewed. Constitutional:       General: She is not in acute distress. Appearance: Normal appearance. She is well-developed. Comments: She is overweight   HENT:      Head: Normocephalic. Right Ear: Tympanic membrane, ear canal and external ear normal.      Left Ear: Tympanic membrane, ear canal and external ear normal.   Eyes:      Extraocular Movements: Extraocular movements intact. Conjunctiva/sclera: Conjunctivae normal.      Pupils: Pupils are equal, round, and reactive to light. Neck:      Musculoskeletal: Normal range of motion and neck supple. Vascular: No carotid bruit. Cardiovascular:      Rate and Rhythm: Normal rate and regular rhythm. Heart sounds: Normal heart sounds.

## 2020-09-22 ENCOUNTER — TELEPHONE (OUTPATIENT)
Dept: PRIMARY CARE CLINIC | Age: 85
End: 2020-09-22

## 2020-09-22 NOTE — TELEPHONE ENCOUNTER
Pt daughter states medication colesevelam is not helping with diarrhea and wishes to have medication changed.

## 2020-09-22 NOTE — TELEPHONE ENCOUNTER
Spoke with Pt daughter who voiced understanding and wished to have her mom seen by Dr. Pieter Bond. Pt scheduled. She voiced understanding.

## 2020-09-22 NOTE — TELEPHONE ENCOUNTER
This is Dr Rona Scott patient and I reviewed her notes. I do not know what else we can try because she cannot take liquid medicines it says for some reason. I would call the patient and tell them were going to wait until Dr. Iris Riedel returns and forward this to her.

## 2020-10-23 ENCOUNTER — OFFICE VISIT (OUTPATIENT)
Dept: PRIMARY CARE CLINIC | Age: 85
End: 2020-10-23
Payer: MEDICARE

## 2020-10-23 VITALS
SYSTOLIC BLOOD PRESSURE: 108 MMHG | HEART RATE: 82 BPM | TEMPERATURE: 97.7 F | BODY MASS INDEX: 30.94 KG/M2 | HEIGHT: 60 IN | OXYGEN SATURATION: 97 % | WEIGHT: 157.6 LBS | RESPIRATION RATE: 16 BRPM | DIASTOLIC BLOOD PRESSURE: 70 MMHG

## 2020-10-23 PROCEDURE — G8399 PT W/DXA RESULTS DOCUMENT: HCPCS | Performed by: FAMILY MEDICINE

## 2020-10-23 PROCEDURE — 4040F PNEUMOC VAC/ADMIN/RCVD: CPT | Performed by: FAMILY MEDICINE

## 2020-10-23 PROCEDURE — 90686 IIV4 VACC NO PRSV 0.5 ML IM: CPT | Performed by: FAMILY MEDICINE

## 2020-10-23 PROCEDURE — G8417 CALC BMI ABV UP PARAM F/U: HCPCS | Performed by: FAMILY MEDICINE

## 2020-10-23 PROCEDURE — G0008 ADMIN INFLUENZA VIRUS VAC: HCPCS | Performed by: FAMILY MEDICINE

## 2020-10-23 PROCEDURE — 1123F ACP DISCUSS/DSCN MKR DOCD: CPT | Performed by: FAMILY MEDICINE

## 2020-10-23 PROCEDURE — G8427 DOCREV CUR MEDS BY ELIG CLIN: HCPCS | Performed by: FAMILY MEDICINE

## 2020-10-23 PROCEDURE — G8482 FLU IMMUNIZE ORDER/ADMIN: HCPCS | Performed by: FAMILY MEDICINE

## 2020-10-23 PROCEDURE — 99213 OFFICE O/P EST LOW 20 MIN: CPT | Performed by: FAMILY MEDICINE

## 2020-10-23 PROCEDURE — 1090F PRES/ABSN URINE INCON ASSESS: CPT | Performed by: FAMILY MEDICINE

## 2020-10-23 PROCEDURE — 1036F TOBACCO NON-USER: CPT | Performed by: FAMILY MEDICINE

## 2020-10-23 NOTE — PROGRESS NOTES
On the basis of positive falls risk screening, assessment and plan is as follows: home safety tips provided, patient declines any further evaluation/treatment for increased falls risk. SUBJECTIVE:    Alexandre Turner is 80 y. o.female who comes in complaining of Diarrhea (has it sometimes, better now, thinks its dairy thats causing it, taking lactate now with dairy and a probiotic)   . HPI: Mrs. Jose Manuel Blanchard came up with her daughter from [place] where she is now living. She has several concerns and we also will follow up with her with some of her chronic conditions. She says that about 3 weeks ago she had a bad episode of diarrhea. She took Imodium and its better now. Her daughter wonders if it could be related to dairy. She started giving her Lactaid tablets and that is when everything got better. She denies any bright red blood per rectum or dark tarry stools. She has had no further falls since going to [place]. Her heart is still out of rhythm. She gets hot flashes occasionally but has had no syncopal episodes. She denies chest pain. She does follow-up with a cardiologist.    She does not check her sugars every day. Her last hemoglobin A1c on June 17, 2020 was 6.1. She denies any episodes of polyuria polyphagia or polydipsia. She is status post breast cancer. Dr. Cammie Vega [facility] orders her mammogram and does her breast exams. Her last mammogram was on July 27, 2020. Allergies   Allergen Reactions    Penicillins      Just does not work    Sulfa Antibiotics Rash       Social History     Socioeconomic History    Marital status:       Spouse name: None    Number of children: 4    Years of education: None    Highest education level: None   Occupational History    Occupation: retired   Social Needs    Financial resource strain: None    Food insecurity     Worry: None     Inability: None    Transportation needs     Medical: None     Non-medical: None   Tobacco Use    Smoking status: Never Smoker    Smokeless tobacco: Never Used   Substance and Sexual Activity    Alcohol use: No    Drug use: No    Sexual activity: None     Comment:    Lifestyle    Physical activity     Days per week: None     Minutes per session: None    Stress: None   Relationships    Social connections     Talks on phone: None     Gets together: None     Attends Evangelical service: None     Active member of club or organization: None     Attends meetings of clubs or organizations: None     Relationship status: None    Intimate partner violence     Fear of current or ex partner: None     Emotionally abused: None     Physically abused: None     Forced sexual activity: None   Other Topics Concern    None   Social History Narrative    None       Review of Systems   Constitutional: Positive for activity change and fatigue. Negative for unexpected weight change. HENT: Negative. Eyes: Negative for visual disturbance. Respiratory: Positive for shortness of breath (with exertion). Cardiovascular: Negative. Gastrointestinal: Negative. Endocrine: Negative for polydipsia, polyphagia and polyuria. Genitourinary: Negative. Negative for dysuria. Musculoskeletal: Positive for arthralgias and gait problem. Neurological: Positive for weakness. Negative for light-headedness, numbness and headaches. Hematological: Bruises/bleeds easily. Psychiatric/Behavioral: Negative. OBJECTIVE:    Wt Readings from Last 3 Encounters:   10/23/20 157 lb 9.6 oz (71.5 kg)   09/14/20 161 lb 9.6 oz (73.3 kg)   07/27/20 160 lb 9.6 oz (72.8 kg)       /70   Pulse 82   Temp 97.7 °F (36.5 °C)   Resp 16   Ht 4' 11.5\" (1.511 m)   Wt 157 lb 9.6 oz (71.5 kg)   SpO2 97%   BMI 31.30 kg/m²     Physical Exam  Vitals signs and nursing note reviewed. Constitutional:       General: She is not in acute distress. Appearance: Normal appearance. She is well-developed. HENT:      Head: Normocephalic. Right Ear: Tympanic membrane, ear canal and external ear normal.      Left Ear: Tympanic membrane, ear canal and external ear normal.      Nose: Nose normal. No rhinorrhea. Eyes:      Extraocular Movements: Extraocular movements intact. Conjunctiva/sclera: Conjunctivae normal.      Pupils: Pupils are equal, round, and reactive to light. Neck:      Musculoskeletal: Normal range of motion and neck supple. Vascular: No carotid bruit. Cardiovascular:      Rate and Rhythm: Normal rate and regular rhythm. Heart sounds: Normal heart sounds. No murmur. Pulmonary:      Effort: Pulmonary effort is normal. No respiratory distress. Breath sounds: Normal breath sounds. Abdominal:      General: Bowel sounds are normal.      Palpations: Abdomen is soft. Tenderness: There is no abdominal tenderness. Musculoskeletal: Normal range of motion. Comments: Chronic changes of osteoarthritis in hands and knees but no increased warmth or redness   Lymphadenopathy:      Cervical: No cervical adenopathy. Skin:     General: Skin is warm and dry. Neurological:      Mental Status: She is alert and oriented to person, place, and time. Psychiatric:         Mood and Affect: Mood normal.         Speech: Speech normal.         Behavior: Behavior normal.         Thought Content: Thought content normal.         Judgment: Judgment normal.         ASSESSMENT:    1. Chronic atrial fibrillation ([address]    2. Malignant neoplasm of female breast, unspecified estrogen receptor status, unspecified laterality, unspecified site of breast ([address]    3. Type 2 diabetes mellitus without complication, without long-term current use of insulin ([address]    4. At high risk for falls    5. Need for influenza vaccination          PLAN:    MEDICATIONS:  No orders of the defined types were placed in this encounter. Continue current medications. We will refill when needed.       ORDERS:  Orders Placed This Encounter   Procedures  INFLUENZA, QUADV, 3 YRS AND OLDER, IM PF, PREFILL SYR OR SDV, 0.5ML (AFLURIA QUADV, PF)     I encouraged her to check glucoses once a day. We will recheck her at her next visit which is in December. If she has him she is to let me know. She will continue to follow-up with a cardiologist.  She has an appointment with Dr. Hola Shaffer in November. She will continue to follow-up with oncology. 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 Follow-up:  Return if symptoms worsen or fail to improve. PATIENT INSTRUCTIONS:  Patient Instructions     We are committed to providing you with the best care possible. In order to help us achieve these goals please remember to bring all medications, herbal products, and over the counter supplements with you to each visit. If your provider has ordered testing for you, please be sure to follow up with our office if you have not received results within 7 days after the testing took place. *If you receive a survey after visiting one of our offices, please take time to share your experience concerning your physician office visit. These surveys are confidential and no health information about you is shared. We are eager to improve for you and we are counting on your feedback to help make that happen. Patient Education        Diarrhea: Care Instructions  Your Care Instructions     Diarrhea is loose, watery stools (bowel movements). The exact cause is often hard to find. Sometimes diarrhea is your body's way of getting rid of what caused an upset stomach. Viruses, food poisoning, and many medicines can cause diarrhea. Some people get diarrhea in response to emotional stress, anxiety, or certain foods. Almost everyone has diarrhea now and then. It usually isn't serious, and your stools will return to normal soon. The important thing to do is replace the fluids you have lost, so you can prevent dehydration.   The doctor has checked you carefully, but problems can develop later. If you notice any problems or new symptoms, get medical treatment right away. Follow-up care is a key part of your treatment and safety. Be sure to make and go to all appointments, and call your doctor if you are having problems. It's also a good idea to know your test results and keep a list of the medicines you take. How can you care for yourself at home? · Watch for signs of dehydration, which means your body has lost too much water. Dehydration is a serious condition and should be treated right away. Signs of dehydration are:  ? Increasing thirst and dry eyes and mouth. ? Feeling faint or lightheaded. ? A smaller amount of urine than normal.  · To prevent dehydration, drink plenty of fluids. Choose water and other caffeine-free clear liquids until you feel better. If you have kidney, heart, or liver disease and have to limit fluids, talk with your doctor before you increase the amount of fluids you drink. · Begin eating small amounts of mild foods the next day, if you feel like it. ? Try yogurt that has live cultures of Lactobacillus. (Check the label.)  ? Avoid spicy foods, fruits, alcohol, and caffeine until 48 hours after all symptoms are gone. ? Avoid chewing gum that contains sorbitol. ? Avoid dairy products (except for yogurt with Lactobacillus) while you have diarrhea and for 3 days after symptoms are gone. · The doctor may recommend that you take over-the-counter medicine, such as loperamide (Imodium), if you still have diarrhea after 6 hours. Read and follow all instructions on the label. Do not use this medicine if you have bloody diarrhea, a high fever, or other signs of serious illness. Call your doctor if you think you are having a problem with your medicine. When should you call for help? Call 911 anytime you think you may need emergency care.  For example, call if:    · You passed out (lost consciousness).     · Your stools are maroon or very bloody. Call your doctor now or seek immediate medical care if:    · You are dizzy or lightheaded, or you feel like you may faint.     · Your stools are black and look like tar, or they have streaks of blood.     · You have new or worse belly pain.     · You have symptoms of dehydration, such as:  ? Dry eyes and a dry mouth. ? Passing only a little dark urine. ? Feeling thirstier than usual.     · You have a new or higher fever. Watch closely for changes in your health, and be sure to contact your doctor if:    · Your diarrhea is getting worse.     · You see pus in the diarrhea.     · You are not getting better after 2 days (48 hours). Where can you learn more? Go to https://[employer]. org and sign in to your [employer] account. Enter Q666 in the [employer] box to learn more about \"Diarrhea: Care Instructions. \"     If you do not have an account, please click on the \"Sign Up Now\" link. Current as of: June 26, 2019               Content Version: 12.6  © 2763-8686 [employer]. Care instructions adapted under license by [facility] ([facility]). If you have questions about a medical condition or this instruction, always ask your healthcare professional. [address] any warranty or liability for your use of this information. EMR Dragon/transcription disclaimer:  Much of this encounter note is electronic transcription/translation of spoken language to printed texts. The electronic translation of spoken language may be erroneous, or at times, nonsensical words or phrases may beinadvertently transcribed.   Although I have reviewed the note for such errors, some may still exist.

## 2020-10-23 NOTE — PATIENT INSTRUCTIONS
We are committed to providing you with the best care possible. In order to help us achieve these goals please remember to bring all medications, herbal products, and over the counter supplements with you to each visit. If your provider has ordered testing for you, please be sure to follow up with our office if you have not received results within 7 days after the testing took place. *If you receive a survey after visiting one of our offices, please take time to share your experience concerning your physician office visit. These surveys are confidential and no health information about you is shared. We are eager to improve for you and we are counting on your feedback to help make that happen. Patient Education        Diarrhea: Care Instructions  Your Care Instructions     Diarrhea is loose, watery stools (bowel movements). The exact cause is often hard to find. Sometimes diarrhea is your body's way of getting rid of what caused an upset stomach. Viruses, food poisoning, and many medicines can cause diarrhea. Some people get diarrhea in response to emotional stress, anxiety, or certain foods. Almost everyone has diarrhea now and then. It usually isn't serious, and your stools will return to normal soon. The important thing to do is replace the fluids you have lost, so you can prevent dehydration. The doctor has checked you carefully, but problems can develop later. If you notice any problems or new symptoms, get medical treatment right away. Follow-up care is a key part of your treatment and safety. Be sure to make and go to all appointments, and call your doctor if you are having problems. It's also a good idea to know your test results and keep a list of the medicines you take. How can you care for yourself at home? · Watch for signs of dehydration, which means your body has lost too much water. Dehydration is a serious condition and should be treated right away.  Signs of dehydration are:  ? Increasing thirst and dry eyes and mouth. ? Feeling faint or lightheaded. ? A smaller amount of urine than normal.  · To prevent dehydration, drink plenty of fluids. Choose water and other caffeine-free clear liquids until you feel better. If you have kidney, heart, or liver disease and have to limit fluids, talk with your doctor before you increase the amount of fluids you drink. · Begin eating small amounts of mild foods the next day, if you feel like it. ? Try yogurt that has live cultures of Lactobacillus. (Check the label.)  ? Avoid spicy foods, fruits, alcohol, and caffeine until 48 hours after all symptoms are gone. ? Avoid chewing gum that contains sorbitol. ? Avoid dairy products (except for yogurt with Lactobacillus) while you have diarrhea and for 3 days after symptoms are gone. · The doctor may recommend that you take over-the-counter medicine, such as loperamide (Imodium), if you still have diarrhea after 6 hours. Read and follow all instructions on the label. Do not use this medicine if you have bloody diarrhea, a high fever, or other signs of serious illness. Call your doctor if you think you are having a problem with your medicine. When should you call for help? Call 911 anytime you think you may need emergency care. For example, call if:    · You passed out (lost consciousness).     · Your stools are maroon or very bloody. Call your doctor now or seek immediate medical care if:    · You are dizzy or lightheaded, or you feel like you may faint.     · Your stools are black and look like tar, or they have streaks of blood.     · You have new or worse belly pain.     · You have symptoms of dehydration, such as:  ? Dry eyes and a dry mouth. ? Passing only a little dark urine. ? Feeling thirstier than usual.     · You have a new or higher fever.    Watch closely for changes in your health, and be sure to contact your doctor if:    · Your diarrhea is getting worse.     · You see pus in

## 2020-10-29 ASSESSMENT — ENCOUNTER SYMPTOMS
GASTROINTESTINAL NEGATIVE: 1
SHORTNESS OF BREATH: 1

## 2020-11-03 ENCOUNTER — CARE COORDINATION (OUTPATIENT)
Dept: CARE COORDINATION | Age: 85
End: 2020-11-03

## 2020-11-03 RX ORDER — GLUCOSAMINE HCL/CHONDROITIN SU 500-400 MG
CAPSULE ORAL
Qty: 90 STRIP | Refills: 5 | Status: SHIPPED | OUTPATIENT
Start: 2020-11-03 | End: 2021-01-27 | Stop reason: ALTCHOICE

## 2020-11-03 RX ORDER — BLOOD-GLUCOSE METER
1 KIT MISCELLANEOUS DAILY
Qty: 1 KIT | Refills: 0 | Status: SHIPPED | OUTPATIENT
Start: 2020-11-03 | End: 2021-01-27 | Stop reason: ALTCHOICE

## 2020-11-03 RX ORDER — LANCETS 30 GAUGE
1 EACH MISCELLANEOUS DAILY
Qty: 100 EACH | Refills: 0 | Status: SHIPPED | OUTPATIENT
Start: 2020-11-03 | End: 2021-01-27 | Stop reason: ALTCHOICE

## 2020-11-03 SDOH — ECONOMIC STABILITY: FOOD INSECURITY: WITHIN THE PAST 12 MONTHS, THE FOOD YOU BOUGHT JUST DIDN'T LAST AND YOU DIDN'T HAVE MONEY TO GET MORE.: NEVER TRUE

## 2020-11-03 SDOH — ECONOMIC STABILITY: INCOME INSECURITY: HOW HARD IS IT FOR YOU TO PAY FOR THE VERY BASICS LIKE FOOD, HOUSING, MEDICAL CARE, AND HEATING?: NOT VERY HARD

## 2020-11-03 SDOH — HEALTH STABILITY: MENTAL HEALTH
STRESS IS WHEN SOMEONE FEELS TENSE, NERVOUS, ANXIOUS, OR CAN'T SLEEP AT NIGHT BECAUSE THEIR MIND IS TROUBLED. HOW STRESSED ARE YOU?: ONLY A LITTLE

## 2020-11-03 SDOH — SOCIAL STABILITY: SOCIAL NETWORK: ARE YOU MARRIED, WIDOWED, DIVORCED, SEPARATED, NEVER MARRIED, OR LIVING WITH A PARTNER?: WIDOWED

## 2020-11-03 SDOH — ECONOMIC STABILITY: FOOD INSECURITY: WITHIN THE PAST 12 MONTHS, YOU WORRIED THAT YOUR FOOD WOULD RUN OUT BEFORE YOU GOT MONEY TO BUY MORE.: NEVER TRUE

## 2020-11-03 SDOH — SOCIAL STABILITY: SOCIAL NETWORK
DO YOU BELONG TO ANY CLUBS OR ORGANIZATIONS SUCH AS CHURCH GROUPS UNIONS, FRATERNAL OR ATHLETIC GROUPS, OR SCHOOL GROUPS?: NOT ASKED

## 2020-11-03 SDOH — SOCIAL STABILITY: SOCIAL NETWORK
IN A TYPICAL WEEK, HOW MANY TIMES DO YOU TALK ON THE PHONE WITH FAMILY, FRIENDS, OR NEIGHBORS?: MORE THAN THREE TIMES A WEEK

## 2020-11-03 SDOH — HEALTH STABILITY: PHYSICAL HEALTH: ON AVERAGE, HOW MANY MINUTES DO YOU ENGAGE IN EXERCISE AT THIS LEVEL?: 0 MIN

## 2020-11-03 SDOH — SOCIAL STABILITY: SOCIAL NETWORK: HOW OFTEN DO YOU ATTENT MEETINGS OF THE CLUB OR ORGANIZATION YOU BELONG TO?: NOT ASKED

## 2020-11-03 SDOH — HEALTH STABILITY: PHYSICAL HEALTH: ON AVERAGE, HOW MANY DAYS PER WEEK DO YOU ENGAGE IN MODERATE TO STRENUOUS EXERCISE (LIKE A BRISK WALK)?: 0 DAYS

## 2020-11-03 SDOH — ECONOMIC STABILITY: TRANSPORTATION INSECURITY
IN THE PAST 12 MONTHS, HAS LACK OF TRANSPORTATION KEPT YOU FROM MEETINGS, WORK, OR FROM GETTING THINGS NEEDED FOR DAILY LIVING?: NO

## 2020-11-03 SDOH — ECONOMIC STABILITY: TRANSPORTATION INSECURITY
IN THE PAST 12 MONTHS, HAS THE LACK OF TRANSPORTATION KEPT YOU FROM MEDICAL APPOINTMENTS OR FROM GETTING MEDICATIONS?: NO

## 2020-11-03 NOTE — CARE COORDINATION
Ambulatory Care Coordination Note  11/3/2020  CM Risk Score: 1  [place] 10 Year Mortality Risk Score: 100%     ACC: Anni Galvan, DENISE    Summary Note: Saundra called back to ACM. She currently stays with her daughter in [place], [place]. She uses [address] in same town. Saundra continues to see her providers in [facility] for now. She uses her walker and a BSC. She does not typically get up during the night, tries to avoid drinking fluids late to prevent this. She continues to have sudden episodes of loose stools/diarrhea and wears appropriate undergarments for this. She said it has been happening for a long time but seems to be more often lately. It usually occurs in the morning after pt has eaten her breakfast. She usually drinks a Diet Mtn Dew and has toast and breakfast meat to eat. She has taken OTC med for it but it continues to be a problem and she would like to get it better controlled. n:  Saundra has DM but does not take any medication for it at this time. Her PCP recommended she check a daily BS but Saundra does not have a glucometer. Plan:  ACM suggested that pt stop drinking the [place] for the next week (she drinks several a day). Suggested that patient drink tea instead with Splenda or [place]. We can evaluate whether it helps in the next week when ACM calls back. Pt agreed. ACM will f/u with PCP about ordering a glucometer for Saundra. Will notify pt of PCP's recommendation on that. She voiced understanding. Established goal to improve / reduce episodes of loose BMs. Pt is agreeable to this.     Ambulatory Care Coordination Assessment    Care Coordination Protocol  Program Enrollment:  Complex Care  Referral from Primary Care Provider:  No  Week 1 - Initial Assessment        Do you have Home O2 Therapy?:  No      Ability to seek help/take action for Emergent Urgent situations i.e. fire, crime, inclement weather or health crisis.:  Needs Assistance  Ability to ambulate to restroom: Independent  Ability handle personal hygeine needs (bathing/dressing/grooming): Independent  Ability to drive and/or has transportation:  Dependent  Ability to do shopping:  Needs Assistance  Is patient able to live independently?:  No           Per the Fall Risk Screening, did the patient have 2 or more falls or 1 fall with injury in the past year?:  Yes  How often do you think you are about to fall and you do NOT fall? For example, you grab something to stabilize yourself or hold onto a wall/furniture?:  Rarely  Use of a Mobility Aid:  Yes  Difficulty walking/impaired gait:  No  Issues with feet or shoes like numbness, edema, shoes not fitting:  No  Changes in vision, poor vision or poor lighting in environment:  No  Dizziness:  No  Other Fall Risk:  No     Frequent urination at night?:  No     Are you experiencing loss of meaning?:  No  Are you experiencing loss of hope and peace?:  No     Thinking about your patient's physical health needs, are there any symptoms or problems (risk indicators) you are unsure about that require further investigation?:  Moderate to severe symptoms or problems that impact on daily life   Are the patients physical health problems impacting on their mental well-being?:  Mild impact on mental well-being e.g. \"\"feeling fed-up\"\", \"\"reduced enjoyment\"\"   Are there any problems with your patients lifestyle behaviors (alcohol, drugs, diet, exercise) that are impacting on physical or mental well-being?:  No identified areas of concern   Do you have any other concerns about your patients mental well-being?  How would you rate their severity and impact on the patient?:  No identified areas of concern   How would you rate their home environment in terms of safety and stability (including domestic violence, insecure housing, neighbor harassment)?:  Consistently safe, supportive, stable, no identified problems   How do daily activities impact on the patient's well-being? (include current or anticipated unemployment, work, caregiving, access to transportation or other):  No identified problems or perceived positive benefits   How would you rate their social network (family, work, friends)?:  Good participation with social networks   How wells does the patient now understand their health and well-being (symptoms, signs or risk factors) and what they need to do to manage their health?:  Reasonable to good understanding and already engages in managing health or is willing to undertake better management   How well do you think your patient can engage in healthcare discussions? (Barriers include language, deafness, aphasia, alcohol or drug problems, learning difficulties, concentration):  Clear and open communication, no identified barriers   Do other services need to be involved to help this patient?:  Other care/services not required at this time   Are current services involved with this patient well-coordinated? (Include coordination with other services you are now recommendation): All required care/services in place and well-coordinated   Suggested Interventions and [employer]   Other Services or Interventions:  11/3: pt has f/u appt on 1/4 with Dr. Janice Malik, cardiologist in [place]. Zone Management Tools: In Process         Set up/Review Goals, Set up/Review an Education Plan, Schedule an appointment with the patient's PCP          Goals      Patient Stated (pt-stated)      Patient would like to reduce incidence of loose/urgent stools    Barriers: lack of education  Plan for overcoming my barriers:   Pt willing to make dietary changes as indicated to improve BM consistency and control  Pt willing to see GI provider if indicated  Confidence: 9/10  Anticipated Goal Completion Date: 2/3/2020            Prior to Admission medications    Medication Sig Start Date End Date Taking?  Authorizing Provider   Probiotic Product (PROBIOTIC PO) Take by mouth daily    Historical Provider, MD   acetaminophen

## 2020-11-03 NOTE — CARE COORDINATION
Pt said diarrhea usually in the morning. She said has morning gas also. She does not drink coffee. She will have a Diet [facility] in the morning. Will eat toast and breakfast meat. She will often have loose stool after she eats. This has been going on \"a long time\". Pt uses adult undergarments. [address] in [place], [place]. She has not seen [facility] - no one since she was in the hospital. Has appt w Dr. Filiberto Duncan tomorrow. No trouble with walking - has a walker and uses it. Pt tries not to get up at night. Has a BSC.

## 2020-11-04 ENCOUNTER — OFFICE VISIT (OUTPATIENT)
Dept: CARDIOLOGY | Age: 85
End: 2020-11-04
Payer: MEDICARE

## 2020-11-04 VITALS
HEART RATE: 82 BPM | WEIGHT: 156 LBS | BODY MASS INDEX: 31.45 KG/M2 | DIASTOLIC BLOOD PRESSURE: 62 MMHG | SYSTOLIC BLOOD PRESSURE: 118 MMHG | HEIGHT: 59 IN

## 2020-11-04 PROCEDURE — G8482 FLU IMMUNIZE ORDER/ADMIN: HCPCS | Performed by: INTERNAL MEDICINE

## 2020-11-04 PROCEDURE — G8417 CALC BMI ABV UP PARAM F/U: HCPCS | Performed by: INTERNAL MEDICINE

## 2020-11-04 PROCEDURE — G8399 PT W/DXA RESULTS DOCUMENT: HCPCS | Performed by: INTERNAL MEDICINE

## 2020-11-04 PROCEDURE — G8427 DOCREV CUR MEDS BY ELIG CLIN: HCPCS | Performed by: INTERNAL MEDICINE

## 2020-11-04 PROCEDURE — 1036F TOBACCO NON-USER: CPT | Performed by: INTERNAL MEDICINE

## 2020-11-04 PROCEDURE — 4040F PNEUMOC VAC/ADMIN/RCVD: CPT | Performed by: INTERNAL MEDICINE

## 2020-11-04 PROCEDURE — 99213 OFFICE O/P EST LOW 20 MIN: CPT | Performed by: INTERNAL MEDICINE

## 2020-11-04 PROCEDURE — 93000 ELECTROCARDIOGRAM COMPLETE: CPT | Performed by: INTERNAL MEDICINE

## 2020-11-04 PROCEDURE — 1090F PRES/ABSN URINE INCON ASSESS: CPT | Performed by: INTERNAL MEDICINE

## 2020-11-04 PROCEDURE — 1123F ACP DISCUSS/DSCN MKR DOCD: CPT | Performed by: INTERNAL MEDICINE

## 2020-11-04 NOTE — PROGRESS NOTES
42-year-old lady with a history of dyslipidemia, diabetes, hypertension, prior Sloan filter, chronic atrial fibrillation and previous aortic valve replacement returns for follow-up. Bioprosthesis placed in the aortic position in July 2006. In April 2019 it was assessed demonstrating normal systolic function, normal function of the bioprosthesis, and moderate MR. She has a history of previous DVT and Sloan filter placement along with chronic atrial fibrillation which have prompted ongoing coverage with an anticoagulant, at present Eliquis 5 b.i.d. In September of this year she experienced an RND with numbness/weakness and numbness of the hand prompting transfer to [facility] where she underwent a thorough evaluation, apparently including a ERLIN without source found. Since returning home she's had no further symptoms suggestive of cerebral ischemia. She does relate some episodic/nocturnal chest pressure but has no such symptoms during waking hours or with activity. Exam today she carries 156 pounds on a 4 foot 11 inch frame. Pressure is 118/62 with an irregular pulse of 82. Diminutive mild mildly endomorphic elderly lady with alopecia. EOMs full, sclerae and conjunctiva normal. PERRLA. No oral cyanosis and dentition normal. Trachea midline with no neck masses. Assessment of internal jugular veins reveals no elevation of central venous pressure at 90 degrees [exam than a chair]. Carotid pulses normal without delay or bruit. Thyroid normal to palpation. Chest exam reveals normal respiratory effort, no abnormal breath sounds and normal expiratory phase. No skin lesions seen. PMI normal. S1, S2 normal with a 1 to 2/6 systolic murmur heard along the left sternal border. Normal bowel sounds without palpable mass or bruit. No clubbing or acrocyanosis. 1+ lower extremity edema. General motor strength appears to be within normal limits. Normal range of motion with normal gait.  Alert, oriented x 3, memory and cognition normal as reflected by history and conversation. EKG reveals atrial fibrillation with an intraventricular conduction delay and pre-transitional Q wave/poor R-wave progression. Assessment/plan:  1. Status post aortic valve replacement - recent ERLIN which obviously also assesses the aortic valve  2. Chronic atrial fibrillation - acceptable rate with anticoagulant coverage and place  3. Dyslipidemia - monitored and managed by Dr. Cathy Back  4. Mitral regurgitation - good control of hypertension only appropriate response needed at this time    Medical records reviewed prior to today's clinic visit including visually reviewing recent diagnostic studies such as ECHOs, labs, and angiograms as well as reading previous encounter notes. More than 15 minutes spent face-to-face with patient in evaluating, and carefully explaining problems and the planned approach and the reasons behind the decisions.

## 2021-01-13 RX ORDER — COLESEVELAM 180 1/1
625 TABLET ORAL 2 TIMES DAILY WITH MEALS
Qty: 180 TABLET | Refills: 3 | Status: SHIPPED | OUTPATIENT
Start: 2021-01-13 | End: 2022-01-31

## 2021-01-15 ENCOUNTER — TELEPHONE (OUTPATIENT)
Dept: PRIMARY CARE CLINIC | Age: 86
End: 2021-01-15

## 2021-01-15 NOTE — TELEPHONE ENCOUNTER
Pt daughter states that pt is not real active and has gotten a bed sore on her back. She is wanting an Rx sent to [facility] in [place] for pt to use on sore.

## 2021-01-26 NOTE — PROGRESS NOTES
Progress Note      Pt Name: Rajeev Needles: 1935  MRN: [id number]    Date of evaluation: 1/27/2021  History Obtained From:  patient, electronic medical record    CHIEF COMPLAINT:    Chief Complaint   Patient presents with    Follow-up     Malignant neoplasm of female breast, unspecified estrogen receptor status, unspecified laterality, unspecified site of breast ([facility] [address]     Current active problems  Resected Stage II left breast cancer   Senile osteoperosis    HISTORY OF PRESENT ILLNESS:    Charles Esteban is a 80 y.o.  female with a history of resected left stage II breast carcinoma dating back to 12/27/2012. She was ER AZ negative. She was HER-2 positive. She completed adjuvant chemotherapy and Herceptin for a year. She denies any new nodules on her chest wall. She has relocated and is now living with her daughter Rm Helton in [place]. She has elected to come back to the office for her Prolia and to be seen twice a year. She has full dentures so she denies any jaw pain. 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 She went to rehabilitation. She had mild aphasia that has resolved. She still has mild weakness of the left upper extremity. They are not sure of what caused the CVA. It was nonhemorrhagic and she is on Eliquis 5 twice a day. She does have a history of atrial fibrillation. She continues taking omeprazole daily without any new GERD symptoms. Blood pressure apparently has been doing okay on her current medication. She does continue taking atorvastatin for cholesterol. She reports that she has not been taking her calcium and vitamin D.      TUMOR HISTORY: Left stage IIA (T2 N0 M0), ER/AZ negative, Her2 Trudi positive breast cancer, 12/27/12  Saundra was seen in initial oncology consultation on 01/17/13, referred by Dr. Brittany Kraus with regard to a diagnosis of left breast cancer.  Her history dates back to just before Thanksgiving when she noticed a lump in her left breast. A mammogram was done early on 11/20/12 that revealed abnormalities in the left breast in the left lower outer quadrant. She has undergone a left modified radical mastectomy with axillary lymph node dissection by Dr. Arvind Grimm on 12/27/12 that reveals a 3.3 cm left breast mass. ER and MN are both negative. Her2 Trudi is 3+ by IHC and amplified by FISH at 6.3. All of the axillary lymph nodes were negative. Margins were clear. Metastatic workup including CT scans of the chest, abdomen and pelvis, MRI of the brain and bone scan were all negative for metastatic disease. Adjuvant chemotherapy including Herceptin was recommended. Her left ventricular ejection fraction on 2D echocardiogram on 12/28/12 was 65%. Chemotherapy including Taxotere, Cytoxan and Herceptin were delivered as outlined below. TREATMENT SUMMARY:  1. Left modified radical mastectomy 12/27/12. 2. Taxotere, Cytoxan and Herceptin chemotherapy, initiated 02/28/13. She completed Taxotere/cytoxan x 6 cycles on 6/28/2013. 3. She continued on weekly herceptin with her final 52nd dose given on 04/04/14. Past Medical History:   Diagnosis Date    A-fib [facility]) 07/28/2006    s/p surgery     Cancer [facility])     breast    Chest pain     Diabetes mellitus ([address]     non-insulin dependent    Diaphoresis     profuse    Family history of early CAD     Gastroesophageal reflux disease     H/O bicuspid aortic valve 5/19/2015    History of blood transfusion     History of phlebitis     Hx of blood clots     Hyperlipidemia     Hypertension     Moderate tricuspid regurgitation 01/12/2016    Osteoarthritis     Stroke ([address] 09/2020    Valvular heart disease         Past Surgical History:   Procedure Laterality Date    AORTIC VALVE REPLACEMENT  07/28/2006    Aortic valve replacement with 21 mm Janett-Lozano pericardial tissue valve.   Luh Ramirez M.D.   [facility]y Low SURGERY      left masectomy    CARDIOVERSION  01/14/2016    CATARACT REMOVAL      CHOLECYSTECTOMY      COLONOSCOPY      DIAGNOSTIC CARDIAC CATH LAB PROCEDURE  2006    left heart cath, left ventriculography and selective  coronary arteriography    EYE SURGERY      HYSTERECTOMY, TOTAL ABDOMINAL      patient doesn't know if she has her ovaries    JOINT REPLACEMENT      TONSILLECTOMY AND ADENOIDECTOMY      TOTAL KNEE ARTHROPLASTY      bilateral total knee replacement           Current Outpatient Medications:     Calcium Carbonate-Vitamin D (OYSTER SHELL CALCIUM/D) 500-200 MG-UNIT TABS, Take 1 tablet by mouth daily, Disp: 90 tablet, Rfl: 3    colesevelam (WELCHOL) 625 MG tablet, Take 1 tablet by mouth 2 times daily (with meals) For diarrhea from gallbladder removal, Disp: 180 tablet, Rfl: 3    Probiotic Product (PROBIOTIC PO), Take by mouth daily, Disp: , Rfl:     acetaminophen (TYLENOL) 325 MG tablet, Take 650 mg by mouth every 6 hours as needed for Pain, Disp: , Rfl:     atorvastatin (LIPITOR) 80 MG tablet, Take 80 mg by mouth nightly, Disp: , Rfl:     apixaban (ELIQUIS) 5 MG TABS tablet, Take 5 mg by mouth every 12 hours, Disp: , Rfl:     gabapentin (NEURONTIN) 100 MG capsule, Take 100 mg by mouth as needed.  , Disp: , Rfl:     PARoxetine (PAXIL) 20 MG tablet, TAKE ONE TABLET BY MOUTH EVERY MORNING ** MAY CAUSE DROWSINESS (Patient taking differently: nightly ), Disp: 90 tablet, Rfl: 3    omeprazole (PRILOSEC) 40 MG delayed release capsule, TAKE ONE CAPSULE BY MOUTH DAILY FOR STOMACH, Disp: 90 capsule, Rfl: 3    denosumab (PROLIA) 60 MG/ML SOLN SC injection, Inject 60 mg into the skin every 6 months, Disp: , Rfl:     PROLENSA 0.07 % SOLN, 1 drop daily , Disp: , Rfl:     bimatoprost (LUMIGAN) 0.03 % ophthalmic drops, 1 drop nightly.  , Disp: , Rfl:     losartan (COZAAR) 25 MG tablet, TAKE ONE TABLET BY MOUTH EVERY DAY, Disp: , Rfl:     metoprolol succinate (TOPROL XL) 100 MG extended release tablet, TAKE ONE TABLET BY MOUTH EVERY DAY, Disp: , Rfl:     metoprolol tartrate (LOPRESSOR) 25 MG tablet, Take 25 mg by mouth 2 times daily, Disp: , Rfl:     losartan (COZAAR) 100 MG tablet, Take 1 tablet by mouth daily For high blood pressure, Disp: 30 tablet, Rfl: 5     Allergies   Allergen Reactions    Penicillins      Just does not work    Sulfa Antibiotics Rash       Social History     Tobacco Use    Smoking status: Never Smoker    Smokeless tobacco: Never Used   Substance Use Topics    Alcohol use: No    Drug use: No       Family History   Problem Relation Age of Onset    Arthritis Mother     Heart Disease Mother     High Blood Pressure Mother     Heart Disease Father        Subjective   REVIEW OF SYSTEMS:   Review of Systems   Constitutional: Positive for fatigue (Mild which is stable). Negative for chills, diaphoresis, fever and unexpected weight change. HENT: Negative for mouth sores, nosebleeds, sore throat, trouble swallowing and voice change. Eyes: Negative for photophobia, discharge and itching. Respiratory: Negative for cough, shortness of breath and wheezing. Cardiovascular: Positive for palpitations (On occasion but rare). Negative for chest pain and leg swelling. Gastrointestinal: Negative for abdominal distention, abdominal pain, blood in stool, constipation, diarrhea, nausea and vomiting. Endocrine: Negative for cold intolerance, heat intolerance, polydipsia and polyuria. Genitourinary: Negative for difficulty urinating, dysuria, hematuria and urgency. Musculoskeletal: Positive for arthralgias and back pain. Negative for joint swelling and myalgias. Skin: Negative for color change and rash. Neurological: Positive for weakness (Left upper extremity). Negative for dizziness, tremors, seizures, syncope and light-headedness. Hematological: Negative for adenopathy. Does not bruise/bleed easily.    Psychiatric/Behavioral: Negative for behavioral problems and suicidal ideas. The patient is not nervous/anxious. All other systems reviewed and are negative. Objective   /70   Pulse 105   Temp 98.1 °F (36.7 °C) (Oral)   Ht 4' 11\" (1.499 m)   Wt 142 lb 3.2 oz (64.5 kg)   SpO2 99%   BMI 28.72 kg/m²     PHYSICAL EXAM:  Physical Exam  Exam conducted with a chaperone present Jose Mercer her daughter). Constitutional:       Appearance: She is well-developed. Comments: Chronically ill-appearing   HENT:      Head: Normocephalic and atraumatic. Eyes:      General: No scleral icterus. Conjunctiva/sclera: Conjunctivae normal.   Neck:      Musculoskeletal: Normal range of motion and neck supple. Trachea: No tracheal deviation. Cardiovascular:      Rate and Rhythm: Normal rate and regular rhythm. Heart sounds: Normal heart sounds. No murmur. Pulmonary:      Effort: Pulmonary effort is normal. No respiratory distress. Breath sounds: Normal breath sounds. Chest:      Chest wall: No mass. Breasts:         Right: Normal. No mass or tenderness. Left: Absent. Abdominal:      General: Bowel sounds are normal. There is no distension. Palpations: Abdomen is soft. Tenderness: There is no abdominal tenderness. Musculoskeletal:         General: Deformity (Arthritic changes generalized) present. No tenderness. Skin:     General: Skin is warm and dry. Findings: No rash. Neurological:      Mental Status: She is alert and oriented to person, place, and time. Coordination: Coordination normal.      Gait: Gait abnormal (Uses straight cane). Psychiatric:         Behavior: Behavior normal.         Thought Content: Thought content normal.          CBC reviewed by me  Lab Results   Component Value Date    WBC 7.09 01/27/2021    HGB 12.6 01/27/2021    HCT 39.2 01/27/2021    MCV 95.4 (H) 01/27/2021     01/27/2021       VISIT DIAGNOSES  1.  Malignant neoplasm of female breast, unspecified estrogen receptor status, unspecified laterality, unspecified site of breast ([facility] [address]    2. Senile osteoporosis    3. Encounter for screening mammogram for breast cancer        ASSESSMENT/PLAN:    1. Resected stage IIa left breast carcinoma 2012 - Stable    Right mammogram performed at [facility]  7/27/2020 was BI-RADS 1. Physical examination is unrevealing for any recurrence. Serology 7/27/2020  CMP creatinine 1.2 with GFR 43  CA-15-3 = 8.9    PLAN  CA-15-3    2. Senile osteoporosis - stable    Bone density performed on 4/18/2018 revealed osteoporosis. Bone density 7/27/2020 at [facility] revealed osteopeniaimprovement with Prolia. PLAN  Prolia today   CMP  A refill on her calcium/vitamin D was electronically forwarded to her pharmacy in [place]. I gave her a 3-month supply with 3 refills. 3.  Colon cancer screening. Screening colonoscopy was performed most recently on 10/28/2019 by Dr. Tangela Dobbins. Possible polypoid tissue was excised, pathology revealed only normal colonic mucosa without any polyp changes. 4.  Cervical cancer screening. Prior [facility]         Orders Placed This Encounter   Procedures    [facility] DIGITAL SCREEN UNILATERAL RIGHT    Comprehensive Metabolic Panel    Cancer Antigen 15-3        Return in about 6 months (around 7/27/2021) for With Shannon Borrero and Prolia.      Sravani Moreno PA-C  11:11 AM  1/27/2021

## 2021-01-27 ENCOUNTER — OFFICE VISIT (OUTPATIENT)
Dept: HEMATOLOGY | Age: 86
End: 2021-01-27
Payer: MEDICARE

## 2021-01-27 ENCOUNTER — HOSPITAL ENCOUNTER (OUTPATIENT)
Dept: INFUSION THERAPY | Age: 86
Discharge: HOME | End: 2021-01-27
Payer: MEDICARE

## 2021-01-27 VITALS
HEART RATE: 105 BPM | WEIGHT: 142.2 LBS | HEIGHT: 59 IN | BODY MASS INDEX: 28.67 KG/M2 | SYSTOLIC BLOOD PRESSURE: 110 MMHG | DIASTOLIC BLOOD PRESSURE: 70 MMHG | OXYGEN SATURATION: 99 % | TEMPERATURE: 98.1 F

## 2021-01-27 DIAGNOSIS — C50.919 MALIGNANT NEOPLASM OF FEMALE BREAST, UNSPECIFIED ESTROGEN RECEPTOR STATUS, UNSPECIFIED LATERALITY, UNSPECIFIED SITE OF BREAST (HCC): Primary | ICD-10-CM

## 2021-01-27 DIAGNOSIS — M81.0 SENILE OSTEOPOROSIS: ICD-10-CM

## 2021-01-27 DIAGNOSIS — Z12.31 ENCOUNTER FOR SCREENING MAMMOGRAM FOR BREAST CANCER: ICD-10-CM

## 2021-01-27 LAB
BASOPHILS ABSOLUTE: 0.02 K/UL (ref 0.01–0.08)
BASOPHILS RELATIVE PERCENT: 0.3 % (ref 0.1–1.2)
EOSINOPHILS ABSOLUTE: 0.06 K/UL (ref 0.04–0.54)
EOSINOPHILS RELATIVE PERCENT: 0.8 % (ref 0.7–7)
HCT VFR BLD CALC: 39.2 % (ref 34.1–44.9)
HEMOGLOBIN: 12.6 G/DL (ref 11.2–15.7)
LYMPHOCYTES ABSOLUTE: 1.43 K/UL (ref 1.18–3.74)
LYMPHOCYTES RELATIVE PERCENT: 20.2 % (ref 19.3–53.1)
MCH RBC QN AUTO: 30.7 PG (ref 25.6–32.2)
MCHC RBC AUTO-ENTMCNC: 32.1 G/DL (ref 32.3–35.5)
MCV RBC AUTO: 95.4 FL (ref 79.4–94.8)
MONOCYTES ABSOLUTE: 0.68 K/UL (ref 0.24–0.82)
MONOCYTES RELATIVE PERCENT: 9.6 % (ref 4.7–12.5)
NEUTROPHILS ABSOLUTE: 4.9 K/UL (ref 1.56–6.13)
NEUTROPHILS RELATIVE PERCENT: 69.1 % (ref 34–71.1)
PDW BLD-RTO: 12.2 % (ref 11.7–14.4)
PLATELET # BLD: 222 K/UL (ref 182–369)
PMV BLD AUTO: 10 FL (ref 7.4–10.4)
RBC # BLD: 4.11 M/UL (ref 3.93–5.22)
WBC # BLD: 7.09 K/UL (ref 3.98–10.04)

## 2021-01-27 PROCEDURE — 1123F ACP DISCUSS/DSCN MKR DOCD: CPT | Performed by: PHYSICIAN ASSISTANT

## 2021-01-27 PROCEDURE — 96372 THER/PROPH/DIAG INJ SC/IM: CPT

## 2021-01-27 PROCEDURE — 85025 COMPLETE CBC W/AUTO DIFF WBC: CPT

## 2021-01-27 PROCEDURE — 6360000002 HC RX W HCPCS: Performed by: PHYSICIAN ASSISTANT

## 2021-01-27 PROCEDURE — G8399 PT W/DXA RESULTS DOCUMENT: HCPCS | Performed by: PHYSICIAN ASSISTANT

## 2021-01-27 PROCEDURE — 1036F TOBACCO NON-USER: CPT | Performed by: PHYSICIAN ASSISTANT

## 2021-01-27 PROCEDURE — G8427 DOCREV CUR MEDS BY ELIG CLIN: HCPCS | Performed by: PHYSICIAN ASSISTANT

## 2021-01-27 PROCEDURE — G8417 CALC BMI ABV UP PARAM F/U: HCPCS | Performed by: PHYSICIAN ASSISTANT

## 2021-01-27 PROCEDURE — 99213 OFFICE O/P EST LOW 20 MIN: CPT | Performed by: PHYSICIAN ASSISTANT

## 2021-01-27 PROCEDURE — 1090F PRES/ABSN URINE INCON ASSESS: CPT | Performed by: PHYSICIAN ASSISTANT

## 2021-01-27 PROCEDURE — 4040F PNEUMOC VAC/ADMIN/RCVD: CPT | Performed by: PHYSICIAN ASSISTANT

## 2021-01-27 PROCEDURE — G8482 FLU IMMUNIZE ORDER/ADMIN: HCPCS | Performed by: PHYSICIAN ASSISTANT

## 2021-01-27 RX ORDER — METHYLPREDNISOLONE SODIUM SUCCINATE 125 MG/2ML
125 INJECTION, POWDER, LYOPHILIZED, FOR SOLUTION INTRAMUSCULAR; INTRAVENOUS PRN
Status: CANCELLED | OUTPATIENT
Start: 2021-07-28

## 2021-01-27 RX ORDER — LOSARTAN POTASSIUM 25 MG/1
TABLET ORAL
COMMUNITY
Start: 2020-11-30 | End: 2021-11-10

## 2021-01-27 RX ORDER — DIPHENHYDRAMINE HYDROCHLORIDE 50 MG/ML
50 INJECTION INTRAMUSCULAR; INTRAVENOUS PRN
Status: CANCELLED | OUTPATIENT
Start: 2021-07-28

## 2021-01-27 RX ORDER — METOPROLOL SUCCINATE 100 MG/1
TABLET, EXTENDED RELEASE ORAL
COMMUNITY
Start: 2020-11-30 | End: 2021-05-05 | Stop reason: DRUGHIGH

## 2021-01-27 RX ORDER — B-COMPLEX WITH VITAMIN C
1 TABLET ORAL DAILY
Qty: 90 TABLET | Refills: 3 | Status: SHIPPED | OUTPATIENT
Start: 2021-01-27 | End: 2022-01-27

## 2021-01-27 RX ADMIN — DENOSUMAB 60 MG: 60 INJECTION SUBCUTANEOUS at 11:11

## 2021-01-27 ASSESSMENT — ENCOUNTER SYMPTOMS
BACK PAIN: 1
COUGH: 0
VOMITING: 0
NAUSEA: 0
DIARRHEA: 0
VOICE CHANGE: 0
SHORTNESS OF BREATH: 0
WHEEZING: 0
COLOR CHANGE: 0
TROUBLE SWALLOWING: 0
ABDOMINAL DISTENTION: 0
EYE DISCHARGE: 0
PHOTOPHOBIA: 0
BLOOD IN STOOL: 0
ABDOMINAL PAIN: 0
SORE THROAT: 0
EYE ITCHING: 0
CONSTIPATION: 0

## 2021-03-23 ENCOUNTER — CARE COORDINATION (OUTPATIENT)
Dept: CARE COORDINATION | Age: 86
End: 2021-03-23

## 2021-03-23 NOTE — CARE COORDINATION
Terri Low [address] - I asked her to have her pharmacist or a pharmacy technician show her how to use the glucometer.   - Stated she will need to keep a log of blood sugar readings. - I asked if she has been taking her medication as prescribed. She stated she is. Her daughter has her on a daily routine. She does not miss a dose. - Patient stated her daughter Ryan Salazar like the mother. \"  I told her that is good. She raised her daughter well. Patient deserves to be taken care of.  - Reminded her that she has an appointment to see Dr Rich Hirsch in [place] on May 5th, 2021. I asked her to call to schedule an Annual Medicare Wellness Visit for the same day. She said she would have her daughter make the appointment. - I asked if she had received the COVID vaccine. She said she has. She has received both doses. Told Ms. Carson Tenorio that I would let Jeremias Otero RN ACM, know of today's conversation. She verbalized understanding. I asked the patient to call the office with needs or questions. She verbalized understanding. Submitted by Rena/LUIS    Routing message to Jeremias Otero to let her know of today's conversation.     Submitted by Rena/LUIS

## 2021-03-24 ENCOUNTER — CARE COORDINATION (OUTPATIENT)
Dept: CARE COORDINATION | Age: 86
End: 2021-03-24

## 2021-03-24 NOTE — CARE COORDINATION
Ambulatory Care Coordination Note  3/24/2021  CM Risk Score: 1  Charlson 10 Year Mortality Risk Score: 100%     ACC: Loida Ugarte, DENISE    Summary Note: ACM followed up on glucometer ordered last fall, sent to patient's pharmacy. ACM then placed several calls to area pharmacies for Medicare Part B participant. Per Candie Swanson at [facility]: [employer] and supplies - from November per Dr. Sarah Ordonez - she stated they are not Part B network and cannot process the order. [facility] in [place], [place]: [phone]. Spoke to [facility]. They do not carry/process glucometer orders. . [phone]. Rhonda at [employer] [facility] [employer]: They do not process glucometer orders. [facility]: [phone]. - spoke to [facility]. Don't do glucometer orders. Aureliano Ely @ [employer] [facility]. Do not process Medicare Part B for meters. ACM unable to speak to [facility] pharmacist in [place], no answer at my call. [facility]:  [phone]. Spoke to [facility]. They will process meter/supplies through Part B  [facility]: [phone]  ACM spoke to [facility] and reviewed her DM mgmt, med compliance and BLE discoloration. Pt stated her family makes her put her feet up. She does not wear compression socks. She is sedentary, does not exercise. She stated her daughter makes sure she takes her medications. She suggested ACM talk with [employer] - she will give [employer] message to call me. Plan:  [employer] called back to ACM. I explained pt's PCP would like fasting daily blood sugar check. Pt had good A1c last year. She does not take any anti-diabetic medication and PCP would like to see her trend on FBS. Informed [employer] that pt's pharmacy did not process meter order sent last fall and local [employer] can file the order with Medicare Part Jesus Moose said she has a glucometer w supplies - One Touch Ultra 2. She will start checking patient's FBS daily. ACM asked about patient's BLE edema, discoloration. [employer] stated that patient sits most of the time w legs down.  Family reminds and assists patient to put the recliner foot rest up but pt does not voluntarily do this and her feet get very dark colored after being in dependent position all day. ACM suggested patient wear compression knee socks, discussed that Victor Manuel Fairchild may purchase these online, may get zippered knee socks to aid with donning the socks. Place on patient when she gets up and remove at bedtime. Victor Manuel Fairchild stated she has some compression knee socks and will try them on patient. Victor Manuel Fairchild said another adult child is now assisting patient also - patient's son lives at [place] [address] home along w patient. He helps make sure patient is taking meds, safe during the day and will respond to her if she needs/requests help. Victor Manuel Fairchild said she reminds patient to take a bath as pt is resistant and does not voluntarily do this. Victor Manuel Fairchild has a remodeled bathroom to make this safe for patient. Pt has a chronic pressure would to coccyx - Victor Manuel Fairchild has checked this due to diarrhea and pressure w sitting. Pt is resistant to her checks but Victor Manuel Fairchild said at last check, pt said she had already put salve on it and Victor Manuel Fairchild was unable to view wound. Pt sits on a air cushion to relieve pressure. ACM suggested Victor Manuel Fairchild take a picture of pressure wound site with each evaluation at home for comparison. If this worsens, home health may be possible way to address and treat/evaluate healing. Victor Manuel Garcias vu.  Dtr thinks pt has lost some weight, stated she doesn't eat that much. Discussed that patient's BMI is wnl, gave reassurance. Consider using vanilla Ensure or like supplement when making / baking at home calls for milk ingredient - this will increase protein and calories to patient's meal.   Weigh patient weekly for check of her stability w intake and wgt loss.     Care Coordination Interventions    Program Enrollment: Complex Care  Referral from Primary Care Provider: No  Suggested Interventions and Community Resources  Medi Set or Pill Pack: Completed (Comment: 3/24: Hank Louise, assures patient is med compliant)  Zone Management Tools: In Process (Comment: 3/24: Pt stable, needs to start keeping FBS log - dtr will implement this. pressure wound remains minimally present to coccyx area, using air cushion and topical paste. Pt is sedentary, does not eat much per dtr. Try Ensure/Boost)         Goals Addressed                 This Visit's Progress     Patient Stated (pt-stated)   No change     Patient would like to reduce incidence of loose/urgent stools    Barriers: lack of education  Plan for overcoming my barriers:   Pt willing to make dietary changes as indicated to improve BM consistency and control  Pt willing to see GI provider if indicated  Confidence: 9/10  Anticipated Goal Completion Date: 2/3/2020            Prior to Admission medications    Medication Sig Start Date End Date Taking?  Authorizing Provider   losartan (COZAAR) 25 MG tablet TAKE ONE TABLET BY MOUTH EVERY DAY 11/30/20  Yes Historical Provider, MD   metoprolol succinate (TOPROL XL) 100 MG extended release tablet TAKE ONE TABLET BY MOUTH EVERY DAY 11/30/20  Yes Historical Provider, MD   Calcium Carbonate-Vitamin D (OYSTER SHELL CALCIUM/D) 500-200 MG-UNIT TABS Take 1 tablet by mouth daily 1/27/21 1/27/22 Yes Jame Bacon PA-C   colesevelam (WELCHOL) 625 MG tablet Take 1 tablet by mouth 2 times daily (with meals) For diarrhea from gallbladder removal 1/13/21  Yes Alaina Archibald MD   metoprolol tartrate (LOPRESSOR) 25 MG tablet Take 25 mg by mouth 2 times daily   Yes Historical Provider, MD   Probiotic Product (PROBIOTIC PO) Take by mouth daily   Yes Historical Provider, MD   acetaminophen (TYLENOL) 325 MG tablet Take 650 mg by mouth every 6 hours as needed for Pain   Yes Historical Provider, MD   atorvastatin (LIPITOR) 80 MG tablet Take 80 mg by mouth nightly 9/9/20 9/10/21 Yes Historical Provider, MD   apixaban (ELIQUIS) 5 MG TABS tablet Take 5 mg by mouth every 12 hours 9/9/20 9/10/21 Yes Historical Provider, MD   gabapentin (NEURONTIN) 100 MG capsule Take 100 mg by mouth as needed. 9/9/20  Yes Historical Provider, MD   losartan (COZAAR) 100 MG tablet Take 1 tablet by mouth daily For high blood pressure 6/20/20  Yes Tonio Rawls MD   PARoxetine (PAXIL) 20 MG tablet TAKE ONE TABLET BY MOUTH EVERY MORNING ** MAY CAUSE DROWSINESS  Patient taking differently: nightly  6/16/20  Yes Tonio Rawls MD   omeprazole (PRILOSEC) 40 MG delayed release capsule TAKE ONE CAPSULE BY MOUTH DAILY FOR STOMACH 4/15/20  Yes Charline Centeno MD   denosumab (PROLIA) 60 MG/ML SOLN SC injection Inject 60 mg into the skin every 6 months   Yes Historical Provider, MD   bimatoprost (LUMIGAN) 0.03 % ophthalmic drops 1 drop nightly. Yes Historical Provider, MD   PROLENSA 0.07 % SOLN 1 drop daily  12/8/14   Historical Provider, MD       Future Appointments   Date Time Provider Sherice Marquezi   5/5/2021 11:00 AM Tonio Rawls MD [facility]   5/5/2021  3:45 PM Ene Mota MD [facility] RMA [facility]   7/27/2021  9:50 AM [facility] MAMMO RM 1 [facility] WOMENS [facility]   7/27/2021 11:00 AM Kimmie Barnard PA-C N [facility] [place] [facility]   7/27/2021 11:30 AM SCHEDULE, [facility] MED ONC [facility] MED ONC [facility]      and   Diabetes Assessment    Medic Alert ID: No  How often do you test your blood sugar?: No Testing   Do you have barriers with adherence to non-pharmacologic self-management interventions?  (Nutrition/Exercise/Self-Monitoring): Yes       Do you have hyperglycemia symptoms?: No   Do you have hypoglycemia symptoms?: No   Blood Sugar Monitoring Regimen: Not Testing

## 2021-03-30 ENCOUNTER — TELEPHONE (OUTPATIENT)
Dept: PRIMARY CARE CLINIC | Age: 86
End: 2021-03-30

## 2021-03-30 NOTE — TELEPHONE ENCOUNTER
Pt needs strips and lancets for One touch ultra 2 sent to [address]. Pt is checking once daily.  Pt has appt 4/5/21

## 2021-04-07 ENCOUNTER — CARE COORDINATION (OUTPATIENT)
Dept: CARE COORDINATION | Age: 86
End: 2021-04-07

## 2021-04-07 DIAGNOSIS — E11.9 TYPE 2 DIABETES MELLITUS WITHOUT COMPLICATION, WITHOUT LONG-TERM CURRENT USE OF INSULIN (HCC): Primary | ICD-10-CM

## 2021-04-07 NOTE — CARE COORDINATION
Ambulatory Care Coordination Note  4/7/2021  CM Risk Score: 1  Charlson 10 Year Mortality Risk Score: 100%     ACC: Karen Hernandez, RN    Summary Note: [facility]. FBS checks:  2 non fasting BS checks: 133 / 136. These were after patient had had a sweet drink in the morning. 2 fasting BS: 116 / 112  Radha wilhelm said she thinks pt retains fluid: 2 days consecutive her wgt was 139 lb and 138 lb. One morning it was surprisingly 141 lb. Pt has been wearing comp socks. Not drinking Ensure much - Radha wilhelm is using Ensure in place of milk in recipes to get the protein supplement. She has not been able to get glucometer supplies and asked that an order be sent to [facility] in [place], [place]. Plan:  Patient's blood sugars appear to be well controlled in the morning. Continue to check and log FBS at least 3 times a week. Praised Radha wilhelm for patient wearing the compression socks - this will help to keep fluid from collecting in her BLE. Continue with Ensure as able to use it in recipes. Continue to weigh daily - this will give good data on patient's dry weight and how often she has episodes of fluid retention. Glucometer/supplies order efaxed to [facility] in [place], [place] - called and spoke to Kimo Funes, pharmacist. Provided Part B insurance info for processing. Care Coordination Interventions    Program Enrollment: Complex Care  Referral from Primary Care Provider: No  Suggested Interventions and Community Resources  Medi Set or Pill Pack: Completed (Comment: Dtr, Radha zahraas, assures patient is med compliant)  Zone Management Tools: In Process (Comment: 4/7: Radha wilhelm said pt is wearing comp knee socks, weighing daily. Dry wgt 138-139; will continue to weigh. FBS 110s, consider ck/log 3x per wk. Berenice using Ensure for milk in recipes.  Plans to switch to [facility] in [place], [place])         Goals Addressed                 This Visit's Progress     COMPLETED: Patient Stated (pt-stated)   Improving     Patient would like to reduce incidence of loose/urgent stools    Barriers: lack of education  Plan for overcoming my barriers:   Pt willing to make dietary changes as indicated to improve BM consistency and control  Pt willing to see GI provider if indicated  Confidence: 9/10  Anticipated Goal Completion Date: 2/3/2020       Self Monitoring        Self-Monitored Blood Glucose - I will check my blood sugar Fasting blood sugar  I will notify my provider of any trends of increasing or decreasing blood sugars over a 1 month period. I will notify my provider if I have any blood sugar readings less than 70 more than 2 times a month. Daily Weights - I will weight myself as directed - Daily and write down weights  I will notify my provider of any increase in weight by 3 or more pounds in 2 days OR 5 or more pounds in a week. Patient Reported Weight No flowsheet data found. Barriers: lack of support and lack of education  Plan for overcoming my barriers:   Daughter partnering with ACM to monitor daily FBS and daily wgt  Daughter support of patient through use of Ensure w recipes, transporting to appts, notifying provider for concerning symptoms. Confidence: 9/10  Anticipated Goal Completion Date: 6/7/21              Prior to Admission medications    Medication Sig Start Date End Date Taking?  Authorizing Provider   Calcium Carbonate-Vitamin D (OYSTER SHELL CALCIUM/D) 500-200 MG-UNIT TABS Take 1 tablet by mouth daily 1/27/21 1/27/22 Yes Jame Bacon PA-C   [facility]) 625 MG tablet Take 1 tablet by mouth 2 times daily (with meals) For diarrhea from gallbladder removal 1/13/21  Yes Jackie Ahmadi MD   Probiotic Product (PROBIOTIC PO) Take by mouth daily   Yes Historical Provider, MD   atorvastatin (LIPITOR) 80 MG tablet Take 80 mg by mouth nightly 9/9/20 9/10/21 Yes Historical Provider, MD   apixaban (ELIQUIS) 5 MG TABS tablet Take 5 mg by mouth every 12 hours 9/9/20 9/10/21 Yes Historical Provider, MD   gabapentin (NEURONTIN) 100 MG capsule Take 100 mg by mouth as needed. 9/9/20  Yes Historical Provider, MD   losartan (COZAAR) 100 MG tablet Take 1 tablet by mouth daily For high blood pressure 6/20/20  Yes Katt Swift MD   PARoxetine (PAXIL) 20 MG tablet TAKE ONE TABLET BY MOUTH EVERY MORNING ** MAY CAUSE DROWSINESS  Patient taking differently: nightly  6/16/20  Yes Katt Swift MD   omeprazole (PRILOSEC) 40 MG delayed release capsule TAKE ONE CAPSULE BY MOUTH DAILY FOR STOMACH 4/15/20  Yes Audelia De Leon MD   denosumab (PROLIA) 60 MG/ML SOLN SC injection Inject 60 mg into the skin every 6 months   Yes Historical Provider, MD   PROLENSA 0.07 % SOLN 1 drop daily  12/8/14  Yes Historical Provider, MD   bimatoprost (LUMIGAN) 0.03 % ophthalmic drops 1 drop nightly. Yes Historical Provider, MD   losartan (COZAAR) 25 MG tablet TAKE ONE TABLET BY MOUTH EVERY DAY 11/30/20   Historical Provider, MD   metoprolol succinate (TOPROL XL) 100 MG extended release tablet TAKE ONE TABLET BY MOUTH EVERY DAY 11/30/20   Historical Provider, MD   metoprolol tartrate (LOPRESSOR) 25 MG tablet Take 25 mg by mouth 2 times daily    Historical Provider, MD   acetaminophen (TYLENOL) 325 MG tablet Take 650 mg by mouth every 6 hours as needed for Pain    Historical Provider, MD       Future Appointments   Date Time Provider Sherice Nascimento   5/5/2021 11:00 AM Katt Swift MD [facility]   5/5/2021  3:45 PM [facility]   7/27/2021  9:50 AM [facility] MAMMO RM 1 [facility] WOMENS [facility] Women's   7/27/2021 11:00 AM ETHAN Arguelles PAD HEMONC [facility]   7/27/2021 11:30 AM SCHEDULE, [facility] MED ONC [facility] MED ONC Rhianna PACKER      and   Diabetes Assessment    Medic Alert ID: No  How often do you test your blood sugar?: No Testing   Do you have barriers with adherence to non-pharmacologic self-management interventions?  (Nutrition/Exercise/Self-Monitoring): Yes       Do you have hyperglycemia symptoms?: No   Do you have hypoglycemia symptoms?: No Last Blood Sugar Value: 136   Blood Sugar Monitoring Regimen: Morning Fasting   Blood Sugar Trends: No Change

## 2021-04-12 ENCOUNTER — CARE COORDINATION (OUTPATIENT)
Dept: CARE COORDINATION | Age: 86
End: 2021-04-12

## 2021-04-12 NOTE — CARE COORDINATION
Pt's daughter, Char Olivera, stated [facility] at [place], [place] told her they don't have meter order. ACM reviewed and noted it was faxed on 4/7 electronically. ACM did re-fax the prescription again this morning to same Rx. Will call and confirm receipt later today.  Electronically signed by Pepper Mac RN on 4/12/2021 at 11:35 AM

## 2021-04-12 NOTE — CARE COORDINATION
JAYME called [facility] in [facility], spoke to Andria Argueta - pharmacist. She did not find the efaxed prescription sent at 11:30 this morning for glucometer, test strips and lancets (90 d supply). ACM confirmed I used the correct fax number. Andria Argueta took verbal order over phone, stated family/patient will need to provide insurance info to process for Part B Medicare. JAYME called [facility] Flores, pt's daughter and caregiver - advised her of verbal order given for glucometer and to provide Saundra's Part B insurance info to the pharmacy. She voiced understanding.  Electronically signed by Manuel Gleason RN on 4/12/2021 at 2:18 PM'

## 2021-04-19 ENCOUNTER — CARE COORDINATION (OUTPATIENT)
Dept: CARE COORDINATION | Age: 86
End: 2021-04-19

## 2021-04-19 NOTE — CARE COORDINATION
provider for concerning symptoms. Confidence: 9/10  Anticipated Goal Completion Date: 6/7/21              Prior to Admission medications    Medication Sig Start Date End Date Taking? Authorizing Provider   losartan (COZAAR) 25 MG tablet TAKE ONE TABLET BY MOUTH EVERY DAY 11/30/20  Yes Historical Provider, MD   metoprolol succinate (TOPROL XL) 100 MG extended release tablet TAKE ONE TABLET BY MOUTH EVERY DAY 11/30/20  Yes Historical Provider, MD   Calcium Carbonate-Vitamin D (OYSTER SHELL CALCIUM/D) 500-200 MG-UNIT TABS Take 1 tablet by mouth daily 1/27/21 1/27/22 Yes Jame Bacon PA-C   colesevelam (WELCHOL) 625 MG tablet Take 1 tablet by mouth 2 times daily (with meals) For diarrhea from gallbladder removal 1/13/21  Yes Dante Salinas MD   metoprolol tartrate (LOPRESSOR) 25 MG tablet Take 25 mg by mouth 2 times daily   Yes Historical Provider, MD   Probiotic Product (PROBIOTIC PO) Take by mouth daily   Yes Historical Provider, MD   acetaminophen (TYLENOL) 325 MG tablet Take 650 mg by mouth every 6 hours as needed for Pain   Yes Historical Provider, MD   atorvastatin (LIPITOR) 80 MG tablet Take 80 mg by mouth nightly 9/9/20 9/10/21 Yes Historical Provider, MD   apixaban (ELIQUIS) 5 MG TABS tablet Take 5 mg by mouth every 12 hours 9/9/20 9/10/21 Yes Historical Provider, MD   gabapentin (NEURONTIN) 100 MG capsule Take 100 mg by mouth as needed.   9/9/20  Yes Historical Provider, MD   losartan (COZAAR) 100 MG tablet Take 1 tablet by mouth daily For high blood pressure 6/20/20  Yes Dante Salinas MD   PARoxetine (PAXIL) 20 MG tablet TAKE ONE TABLET BY MOUTH EVERY MORNING ** MAY CAUSE DROWSINESS  Patient taking differently: nightly  6/16/20  Yes Dante Salinas MD   omeprazole (PRILOSEC) 40 MG delayed release capsule TAKE ONE CAPSULE BY MOUTH DAILY FOR STOMACH 4/15/20  Yes Hong Garrett MD   denosumab (PROLIA) 60 MG/ML SOLN SC injection Inject 60 mg into the skin every 6 months   Yes Historical Provider, MD

## 2021-05-04 ENCOUNTER — CARE COORDINATION (OUTPATIENT)
Dept: CARE COORDINATION | Age: 86
End: 2021-05-04

## 2021-05-05 ENCOUNTER — TELEPHONE (OUTPATIENT)
Dept: PRIMARY CARE CLINIC | Age: 86
End: 2021-05-05

## 2021-05-05 ENCOUNTER — OFFICE VISIT (OUTPATIENT)
Dept: PRIMARY CARE CLINIC | Age: 86
End: 2021-05-05
Payer: MEDICARE

## 2021-05-05 ENCOUNTER — OFFICE VISIT (OUTPATIENT)
Dept: CARDIOLOGY CLINIC | Age: 86
End: 2021-05-05
Payer: MEDICARE

## 2021-05-05 VITALS
WEIGHT: 133 LBS | HEART RATE: 88 BPM | DIASTOLIC BLOOD PRESSURE: 94 MMHG | SYSTOLIC BLOOD PRESSURE: 142 MMHG | BODY MASS INDEX: 26.81 KG/M2 | HEIGHT: 59 IN | RESPIRATION RATE: 16 BRPM | TEMPERATURE: 97.9 F | OXYGEN SATURATION: 99 %

## 2021-05-05 VITALS
BODY MASS INDEX: 26.81 KG/M2 | SYSTOLIC BLOOD PRESSURE: 94 MMHG | DIASTOLIC BLOOD PRESSURE: 60 MMHG | WEIGHT: 133 LBS | HEART RATE: 95 BPM | HEIGHT: 59 IN

## 2021-05-05 DIAGNOSIS — I10 ESSENTIAL HYPERTENSION: ICD-10-CM

## 2021-05-05 DIAGNOSIS — Z91.81 AT HIGH RISK FOR FALLS: ICD-10-CM

## 2021-05-05 DIAGNOSIS — G62.9 NEUROPATHY: ICD-10-CM

## 2021-05-05 DIAGNOSIS — E11.9 TYPE 2 DIABETES MELLITUS WITHOUT COMPLICATION, WITHOUT LONG-TERM CURRENT USE OF INSULIN (HCC): ICD-10-CM

## 2021-05-05 DIAGNOSIS — G45.9 TIA (TRANSIENT ISCHEMIC ATTACK): ICD-10-CM

## 2021-05-05 DIAGNOSIS — Z00.00 ROUTINE GENERAL MEDICAL EXAMINATION AT A HEALTH CARE FACILITY: Primary | ICD-10-CM

## 2021-05-05 DIAGNOSIS — I48.19 PERSISTENT ATRIAL FIBRILLATION (HCC): ICD-10-CM

## 2021-05-05 DIAGNOSIS — F32.A MODERATE DEPRESSIVE DISORDER: ICD-10-CM

## 2021-05-05 DIAGNOSIS — I48.19 PERSISTENT ATRIAL FIBRILLATION (HCC): Primary | ICD-10-CM

## 2021-05-05 DIAGNOSIS — Z13.31 POSITIVE DEPRESSION SCREENING: ICD-10-CM

## 2021-05-05 DIAGNOSIS — E78.2 MIXED HYPERLIPIDEMIA: ICD-10-CM

## 2021-05-05 PROBLEM — Z86.0101 HISTORY OF ADENOMATOUS POLYP OF COLON: Status: ACTIVE | Noted: 2019-09-13

## 2021-05-05 PROBLEM — Z86.010 HISTORY OF ADENOMATOUS POLYP OF COLON: Status: ACTIVE | Noted: 2019-09-13

## 2021-05-05 LAB
ALBUMIN SERPL-MCNC: 4.1 G/DL (ref 3.5–5.2)
ALP BLD-CCNC: 45 U/L (ref 35–104)
ALT SERPL-CCNC: 6 U/L (ref 5–33)
ANION GAP SERPL CALCULATED.3IONS-SCNC: 12 MMOL/L (ref 7–19)
AST SERPL-CCNC: 16 U/L (ref 5–32)
BILIRUB SERPL-MCNC: 0.3 MG/DL (ref 0.2–1.2)
BUN BLDV-MCNC: 18 MG/DL (ref 8–23)
CALCIUM SERPL-MCNC: 9.5 MG/DL (ref 8.8–10.2)
CHLORIDE BLD-SCNC: 102 MMOL/L (ref 98–111)
CHOLESTEROL, TOTAL: 134 MG/DL (ref 160–199)
CO2: 25 MMOL/L (ref 22–29)
CREAT SERPL-MCNC: 1.1 MG/DL (ref 0.5–0.9)
GFR AFRICAN AMERICAN: 57
GFR NON-AFRICAN AMERICAN: 47
GLUCOSE BLD-MCNC: 108 MG/DL (ref 74–109)
HBA1C MFR BLD: 5.8 % (ref 4–6)
HCT VFR BLD CALC: 39.1 % (ref 37–47)
HDLC SERPL-MCNC: 66 MG/DL (ref 65–121)
HEMOGLOBIN: 12 G/DL (ref 12–16)
LDL CHOLESTEROL CALCULATED: 53 MG/DL
MCH RBC QN AUTO: 29.5 PG (ref 27–31)
MCHC RBC AUTO-ENTMCNC: 30.7 G/DL (ref 33–37)
MCV RBC AUTO: 96.1 FL (ref 81–99)
PDW BLD-RTO: 13 % (ref 11.5–14.5)
PLATELET # BLD: 253 K/UL (ref 130–400)
PMV BLD AUTO: 9.9 FL (ref 9.4–12.3)
POTASSIUM SERPL-SCNC: 4.3 MMOL/L (ref 3.5–5)
RBC # BLD: 4.07 M/UL (ref 4.2–5.4)
SODIUM BLD-SCNC: 139 MMOL/L (ref 136–145)
TOTAL PROTEIN: 7.1 G/DL (ref 6.6–8.7)
TRIGL SERPL-MCNC: 77 MG/DL (ref 0–149)
WBC # BLD: 8.8 K/UL (ref 4.8–10.8)

## 2021-05-05 PROCEDURE — G8427 DOCREV CUR MEDS BY ELIG CLIN: HCPCS | Performed by: INTERNAL MEDICINE

## 2021-05-05 PROCEDURE — 36415 COLL VENOUS BLD VENIPUNCTURE: CPT | Performed by: FAMILY MEDICINE

## 2021-05-05 PROCEDURE — 1036F TOBACCO NON-USER: CPT | Performed by: FAMILY MEDICINE

## 2021-05-05 PROCEDURE — 1090F PRES/ABSN URINE INCON ASSESS: CPT | Performed by: INTERNAL MEDICINE

## 2021-05-05 PROCEDURE — 93000 ELECTROCARDIOGRAM COMPLETE: CPT | Performed by: INTERNAL MEDICINE

## 2021-05-05 PROCEDURE — 1123F ACP DISCUSS/DSCN MKR DOCD: CPT | Performed by: INTERNAL MEDICINE

## 2021-05-05 PROCEDURE — G8417 CALC BMI ABV UP PARAM F/U: HCPCS | Performed by: FAMILY MEDICINE

## 2021-05-05 PROCEDURE — 4040F PNEUMOC VAC/ADMIN/RCVD: CPT | Performed by: FAMILY MEDICINE

## 2021-05-05 PROCEDURE — 4040F PNEUMOC VAC/ADMIN/RCVD: CPT | Performed by: INTERNAL MEDICINE

## 2021-05-05 PROCEDURE — G8399 PT W/DXA RESULTS DOCUMENT: HCPCS | Performed by: INTERNAL MEDICINE

## 2021-05-05 PROCEDURE — 99214 OFFICE O/P EST MOD 30 MIN: CPT | Performed by: FAMILY MEDICINE

## 2021-05-05 PROCEDURE — G8431 POS CLIN DEPRES SCRN F/U DOC: HCPCS | Performed by: FAMILY MEDICINE

## 2021-05-05 PROCEDURE — G8427 DOCREV CUR MEDS BY ELIG CLIN: HCPCS | Performed by: FAMILY MEDICINE

## 2021-05-05 PROCEDURE — 99213 OFFICE O/P EST LOW 20 MIN: CPT | Performed by: INTERNAL MEDICINE

## 2021-05-05 PROCEDURE — 1090F PRES/ABSN URINE INCON ASSESS: CPT | Performed by: FAMILY MEDICINE

## 2021-05-05 PROCEDURE — 1123F ACP DISCUSS/DSCN MKR DOCD: CPT | Performed by: FAMILY MEDICINE

## 2021-05-05 PROCEDURE — G0439 PPPS, SUBSEQ VISIT: HCPCS | Performed by: FAMILY MEDICINE

## 2021-05-05 PROCEDURE — 1036F TOBACCO NON-USER: CPT | Performed by: INTERNAL MEDICINE

## 2021-05-05 PROCEDURE — G8417 CALC BMI ABV UP PARAM F/U: HCPCS | Performed by: INTERNAL MEDICINE

## 2021-05-05 PROCEDURE — G8399 PT W/DXA RESULTS DOCUMENT: HCPCS | Performed by: FAMILY MEDICINE

## 2021-05-05 RX ORDER — GABAPENTIN 100 MG/1
CAPSULE ORAL
Qty: 30 CAPSULE | Refills: 2 | Status: SHIPPED | OUTPATIENT
Start: 2021-05-05 | End: 2021-11-10 | Stop reason: ALTCHOICE

## 2021-05-05 RX ORDER — PAROXETINE HYDROCHLORIDE 20 MG/1
TABLET, FILM COATED ORAL
Qty: 90 TABLET | Refills: 3 | Status: SHIPPED | OUTPATIENT
Start: 2021-05-05 | End: 2022-05-26

## 2021-05-05 RX ORDER — CALCIUM LACTATE 100 MG
TABLET ORAL
COMMUNITY

## 2021-05-05 ASSESSMENT — PATIENT HEALTH QUESTIONNAIRE - PHQ9
9. THOUGHTS THAT YOU WOULD BE BETTER OFF DEAD, OR OF HURTING YOURSELF: 0
1. LITTLE INTEREST OR PLEASURE IN DOING THINGS: 2
5. POOR APPETITE OR OVEREATING: 3
2. FEELING DOWN, DEPRESSED OR HOPELESS: 3
4. FEELING TIRED OR HAVING LITTLE ENERGY: 3
10. IF YOU CHECKED OFF ANY PROBLEMS, HOW DIFFICULT HAVE THESE PROBLEMS MADE IT FOR YOU TO DO YOUR WORK, TAKE CARE OF THINGS AT HOME, OR GET ALONG WITH OTHER PEOPLE: 2

## 2021-05-05 ASSESSMENT — COLUMBIA-SUICIDE SEVERITY RATING SCALE - C-SSRS: 1. WITHIN THE PAST MONTH, HAVE YOU WISHED YOU WERE DEAD OR WISHED YOU COULD GO TO SLEEP AND NOT WAKE UP?: NO

## 2021-05-05 ASSESSMENT — LIFESTYLE VARIABLES: HOW OFTEN DO YOU HAVE A DRINK CONTAINING ALCOHOL: 0

## 2021-05-05 NOTE — LETTER
[address] [address] [place] [address]  Phone: [phone]  Fax: [phone]    Renetta Curry MD        May 12, 2021     [facility]  [address] [address]      Dear Gini Hammans:    Below are the results from your recent visit:    Resulted Orders   Lipid Panel   Result Value Ref Range    Cholesterol, Total 134 (L) 160 - 199 mg/dL      Comment:      <160 MG/DL=OPTIMAL    160-199 MG/DL= DESIRABLE    200-239 MG/DL=BORDERLINE-INCREASED RISK OF ATHEROSCLEROTIC  CARDIOVASCULAR DISEASE    > OR = 240 MG/DL-ASSOCIATED WITH AN INCREASED RISK OF  ATHROSCLEROTIC CARDIOVASCULAR DISEASE      Triglycerides 77 0 - 149 mg/dL    HDL 66 65 - 121 mg/dL      Comment:      VALUES>60 MG/DL ARE ASSOCIATED WITH A DECREASED RISK OF  ATHEROSCLEROTIC CARDIOVASCULAR DISEASE      LDL Calculated 53 <100 mg/dL      Comment:      <100 MG/DL=OPITIMAL    100-129 MG/DL=DESIRABLE    130-159 MG/DL BORDERLINE=INCREASED RISK OF ATHEROSCLEROTIC  CARDIOVASCULAR DISEASE    > OR = 160 MG/DL=ASSOCIATED WITH AN INCREASE RISK OF  ATHEROSCLEROTIC CARDIOVASCULAR DISEASE     CBC   Result Value Ref Range    WBC 8.8 4.8 - 10.8 K/uL    RBC 4.07 (L) 4.20 - 5.40 M/uL    Hemoglobin 12.0 12.0 - 16.0 g/dL    Hematocrit 39.1 37.0 - 47.0 %    MCV 96.1 81.0 - 99.0 fL    MCH 29.5 27.0 - 31.0 pg    MCHC 30.7 (L) 33.0 - 37.0 g/dL    RDW 13.0 11.5 - 14.5 %    Platelets 501 721 - 120 K/uL    MPV 9.9 9.4 - 12.3 fL   Hemoglobin A1C   Result Value Ref Range    Hemoglobin A1C 5.8 4.0 - 6.0 %      Comment:      HbA1c levels >6% are an indication of hyperglycemia during the preceding 2  to 3 months or longer. HbA1c levels may reach 20% or higher in poorly controlled diabetes. Therapeutic action is suggested at levels above 8%. Diabetes patients with HbA1c levels below 7% meet the goal of the American  Diabetes Association.     HbA1c levels below the established reference range may indicate recent  episodes of hypoglycemia, the presence of Hb variants, or shortened lifetime  of erythrocytes.      Comprehensive Metabolic Panel   Result Value Ref Range    Sodium 139 136 - 145 mmol/L    Potassium 4.3 3.5 - 5.0 mmol/L    Chloride 102 98 - 111 mmol/L    CO2 25 22 - 29 mmol/L    Anion Gap 12 7 - 19 mmol/L    Glucose 108 74 - 109 mg/dL    BUN 18 8 - 23 mg/dL    CREATININE 1.1 (H) 0.5 - 0.9 mg/dL    GFR Non- 47 (A) >60      Comment: This calculation may be inaccurate for patients under the age of 25 years. For ages 25 and older, a GFR >60 mL/min/1.73m2 (not corrected for weight) is  valid for stable renal function. GFR  57 (L) >59      Comment:      Chronic Kidney Disease: less than 60 ml/min/1.73 sq.m. Kidney Failure: less than 15 ml/min/1.73 sq.m. Results valid for patients 18 years and older. Calcium 9.5 8.8 - 10.2 mg/dL    Total Protein 7.1 6.6 - 8.7 g/dL    Albumin 4.1 3.5 - 5.2 g/dL    Total Bilirubin 0.3 0.2 - 1.2 mg/dL    Alkaline Phosphatase 45 35 - 104 U/L    ALT 6 5 - 33 U/L    AST 16 5 - 32 U/L     Hemoglobin A1c is excellent   White count normal no anemia   lipids are good.  Continue atorvastatin 80 mg 1 a day. Kidney function is slightly decreased but stable.  Recheck labs in 6 months    If you have any questions or concerns, please don't hesitate to call.     Sincerely,        Paula Wilson MD

## 2021-05-05 NOTE — PATIENT INSTRUCTIONS
We are committed to providing you with the best care possible. In order to help us achieve these goals please remember to bring all medications, herbal products, and over the counter supplements with you to each visit. If your provider has ordered testing for you, please be sure to follow up with our office if you have not received results within 7 days after the testing took place. *If you receive a survey after visiting one of our offices, please take time to share your experience concerning your physician office visit. These surveys are confidential and no health information about you is shared. We are eager to improve for you and we are counting on your feedback to help make that happen. Personalized Preventive Plan for Jayashree Rico - 5/5/2021  Medicare offers a range of preventive health benefits. Some of the tests and screenings are paid in full while other may be subject to a deductible, co-insurance, and/or copay. Some of these benefits include a comprehensive review of your medical history including lifestyle, illnesses that may run in your family, and various assessments and screenings as appropriate. After reviewing your medical record and screening and assessments performed today your provider may have ordered immunizations, labs, imaging, and/or referrals for you. A list of these orders (if applicable) as well as your Preventive Care list are included within your After Visit Summary for your review. Other Preventive Recommendations:    · A preventive eye exam performed by an eye specialist is recommended every 1-2 years to screen for glaucoma; cataracts, macular degeneration, and other eye disorders. · A preventive dental visit is recommended every 6 months. · Try to get at least 150 minutes of exercise per week or 10,000 steps per day on a pedometer . · Order or download the FREE \"Exercise & Physical Activity: Your Everyday Guide\" from The [employer].  Call 1-124.849.2831 or search The [employer] on [address] [place] [address]. · You need 2687-6367 mg of calcium and 5955-4908 IU of vitamin D per day. It is possible to meet your calcium requirement with diet alone, but a vitamin D supplement is usually necessary to meet this goal.  · When exposed to the sun, use a sunscreen that protects against both UVA and UVB radiation with an SPF of 30 or greater. Reapply every 2 to 3 hours or after sweating, drying off with a towel, or swimming. · Always wear a seat belt when traveling in a car. Always wear a helmet when riding a bicycle or motorcycle. Keep Your Memory Thermon Banister       Many factors can affect your ability to remembera hectic lifestyle, aging, stress, chronic disease, and certain medicines. But, there are steps you can take to sharpen your mind and help preserve your memory. Challenge Your Brain   Regularly challenging your mind may help keeps it in top shape. Good mental exercises include:   Crossword puzzlesUse a dictionary if you need it; you will learn more that way. Brainteasers Try some! Crafts, such as wood working and sewing   Hobbies, such as gardening and building model airplanes   SocializingVisit old friends or join groups to meet new ones. Reading   Learning a new language   Taking a class, whether it be art history or jose manuel chi   TravelingExperience the food, history, and culture of your destination   Learning to use a computer   Going to museums, the theater, or thought-provoking movies   Changing things in your daily life, such as reversing your pattern in the grocery store or brushing your teeth using your nondominant hand   Use Memory Aids   There is no need to remember every detail on your own. These memory aids can help:   Calendars and day planners   Electronic organizers to store all sorts of helpful informationThese devices can \"beep\" to remind you of appointments.    A book of days to record birthdays, anniversaries, and other occasions that occur on the same date every year   Detailed \"to-do\" lists and strategically placed sticky notes   Quick \"study\" sessionsBefore a gathering, review who will be there so their names will be fresh in your mind. Establish routinesFor example, keep your keys, wallet, and umbrella in the same place all the time or take medicine with your 8:00 AM glass of juice   Live a Healthy Life   Many actions that will keep your body strong will do the same for your mind. For example:   Talk to Your Doctor About Herbs and Supplements    Malnutrition and vitamin deficiencies can impair your mental function. For example, vitamin B12 deficiency can cause a range of symptoms, including confusion. But, what if your nutritional needs are being met? Can herbs and supplements still offer a benefit? Researchers have investigated a range of natural remedies, such as ginkgo , ginseng , and the supplement phosphatidylserine (PS). So far, though, the evidence is inconsistent as to whether these products can improve memory or thinking. If you are interested in taking herbs and supplements, talk to your doctor first because they may interact with other medicines that you are taking. Exercise Regularly    Among the many benefits of regular exercise are increased blood flow to the brain and decreased risk of certain diseases that can interfere with memory function. One study found that even moderate exercise has a beneficial effect. Examples of \"moderate\" exercise include:   Playing 18 holes of golf once a week, without a cart   Playing tennis twice a week   Walking one mile per day   Manage Stress    It can be tough to remember what is important when your mind is cluttered. Make time for relaxation. Choose activities that calm you down, and make it routine. Manage Chronic Conditions    Side effects of high blood pressure , diabetes, and heart disease can interfere with mental function.  Many of the lifestyle steps discussed here can help manage these conditions. Strive to eat a healthy diet, exercise regularly, get stress under control, and follow your doctor's advice for your condition. Minimize Medications    Talk to your doctor about the medicines that you take. Some may be unnecessary. Also, healthy lifestyle habits may lower the need for certain drugs. Last Reviewed: April 2010 Jana Fair MD   Updated: 4/13/2010   ·     [address] [address] [address] [facility]       As we get older, changes in balance, gait, strength, vision, hearing, and cognition make even the most youthful senior more prone to accidents. Falls are one of the leading health risks for older people. This increased risk of falling is related to:   Aging process (eg, decreased muscle strength, slowed reflexes)   Higher incidence of chronic health problems (eg, arthritis, diabetes) that may limit mobility, agility or sensory awareness   Side effects of medicine (eg, dizziness, blurred vision)especially medicines like prescription pain medicines and drugs used to treat mental health conditions   Depending on the brittleness of your bones, the consequences of a fall can be serious and long lasting. Home Life   Research by the Association of Aging [facility]) shows that some home accidents among older adults can be prevented by making simple lifestyle changes and basic modifications and repairs to the home environment. Here are some lifestyle changes that experts recommend:   Have your hearing and vision checked regularly. Be sure to wear prescription glasses that are right for you. Speak to your doctor or pharmacist about the possible side effects of your medicines. A number of medicines can cause dizziness. If you have problems with sleep, talk to your doctor. Limit your intake of alcohol. If necessary, use a cane or walker to help maintain your balance. Wear supportive, rubber-soled shoes, even at home.  If you live in a region that gets wintry weather, you may want to put special cleats on your shoes to prevent you from slipping on the snow and ice. Exercise regularly to help maintain muscle tone, agility, and balance. Always hold the banister when going up or down stairs. Also, use  bars when getting in or out of the bath or shower, or using the toilet. To avoid dizziness, get up slowly from a lying down position. Sit up first, dangling your legs for a minute or two before rising to a standing position. Overall Home Safety Check   According to the Consumer Product Safety Commision's \"Older Consumer Home Safety Checklist,\" it is important to check for potential hazards in each room. And remember, proper lighting is an essential factor in home safety. If you cannot see clearly, you are more likely to fall. Important questions to ask yourself include:   Are lamp, electric, extension, and telephone cords placed out of the flow of traffic and maintained in good condition? Have frayed cords been replaced? Are all small rugs and runners slip resistant? If not, you can secure them to the floor with a special double-sided carpet tape. Are smoke detectors properly locatedone on every floor of your home and one outside of every sleeping area? Are they in good working order? Are batteries replaced at least once a year? Do you have a well-maintained carbon monoxide detector outside every sleeping are in your home? Does your furniture layout leave plenty of space to maneuver between and around chairs, tables, beds, and sofas? Are hallways, stairs and passages between rooms well lit? Can you reach a lamp without getting out of bed? Are floor surfaces well maintained? Shag rugs, high-pile carpeting, tile floors, and polished wood floors can be particularly slippery. Stairs should always have handrails and be carpeted or fitted with a non-skid tread. Is your telephone easily reachable. Is the cord safely tucked away?    Room by Room   According to the Association of Aging, bathrooms and lupillo are the two most potentially hazardous rooms in your home. In the Kitchen    Be sure your stove is in proper working order and always make sure burners and the oven are off before you go out or go to sleep. Keep pots on the back burners, turn handles away from the front of the stove, and keep stove clean and free of grease build-up. Kitchen ventilation systems and range exhausts should be working properly. Keep flammable objects such as towels and pot holders away from the cooking area except when in use. Make sure kitchen curtains are tied back. Move cords and appliances away from the sink and hot surfaces. If extension cords are needed, install wiring guides so they do not hang over the sink, range, or working areas. Look for coffee pots, kettles and toaster ovens with automatic shut-offs. Keep a mop handy in the kitchen so you can wipe up spills instantly. You should also have a small fire extinguisher. Arrange your kitchen with frequently used items on lower shelves to avoid the need to stand on a stepstool to reach them. Make sure countertops are well-lit to avoid injuries while cutting and preparing food. In the Bathroom    Use a non-slip mat or decals in the tub and shower, since wet, soapy tile or porcelain surfaces are extremely slippery. Make sure bathroom rugs are non-skid or tape them firmly to the floor. Bathtubs should have at least one, preferably two, grab bars, firmly attached to structural supports in the wall. (Do not use built-in soap holders or glass shower doors as grab bars.)    Tub seats fitted with non-slip material on the legs allow you to wash sitting down. For people with limited mobility, bathtub transfer benches allow you to slide safely into the tub. Raised toilet seats and toilet safety rails are helpful for those with knee or hip problems.     In the [facility]    Make sure you use a nightlight and that the area around your bed is aids, household security devices, ergonomically designed knives and peelers, and faucet valves and knobs for temperature control. Medical supply stores and organizations are good sources of information about products that improve your quality of life and insure your safety.      Last Reviewed: November 2009 Emily Fraire MD   Updated: 3/7/2011     ·

## 2021-05-05 NOTE — PROGRESS NOTES
Medicare Annual Wellness Visit  Name: Umair Bond Date: 2021   MRN: [id number] Sex: Female   Age: 80 y.o. Ethnicity: Non-/Non    : 1935 Race: White      Saundra Ovalle is here for Medicare AWV (Pt is here for annual wellness visit. Pt is not fasting. ) and Other (Pt daughter states that pt's feet turn black if he doesn't elevate them frequently. Pt daughter states seh was told to check pt's glucose and can hardly get any blood to test. )    Screenings for behavioral, psychosocial and functional/safety risks, and cognitive dysfunction are all negative except as indicated below. These results, as well as other patient data from the [address] form, are documented in Flowsheets linked to this Encounter. Allergies   Allergen Reactions    Penicillins      Just does not work    Sulfa Antibiotics Rash       Prior to Visit Medications    Medication Sig Taking?  Authorizing Provider   Calcium Lactate 100 MG TABS Take by mouth Yes Historical Provider, MD   losartan (COZAAR) 25 MG tablet TAKE ONE TABLET BY MOUTH EVERY DAY Yes Historical Provider, MD   metoprolol succinate (TOPROL XL) 100 MG extended release tablet TAKE ONE TABLET BY MOUTH EVERY DAY Yes Historical Provider, MD   Calcium Carbonate-Vitamin D (OYSTER SHELL CALCIUM/D) 500-200 MG-UNIT TABS Take 1 tablet by mouth daily Yes Baldev Waggoner PA-C   colesevelam (WELCHOL) 625 MG tablet Take 1 tablet by mouth 2 times daily (with meals) For diarrhea from gallbladder removal Yes Gene Roberts MD   metoprolol tartrate (LOPRESSOR) 25 MG tablet Take 25 mg by mouth 2 times daily Yes Historical Provider, MD   Probiotic Product (PROBIOTIC PO) Take by mouth daily Yes Historical Provider, MD   acetaminophen (TYLENOL) 325 MG tablet Take 650 mg by mouth every 6 hours as needed for Pain Yes Historical Provider, MD   atorvastatin (LIPITOR) 80 MG tablet Take 80 mg by mouth nightly Yes Historical Provider, MD   apixaban (ELIQUIS) 5 MG TABS tablet Take 5 mg by mouth every 12 hours Yes Historical Provider, MD   losartan (COZAAR) 100 MG tablet Take 1 tablet by mouth daily For high blood pressure Yes Christine Cullen MD   PARoxetine (PAXIL) 20 MG tablet TAKE ONE TABLET BY MOUTH EVERY MORNING ** MAY CAUSE DROWSINESS  Patient taking differently: nightly  Yes Christine Cullen MD   omeprazole (PRILOSEC) 40 MG delayed release capsule TAKE ONE CAPSULE BY MOUTH DAILY FOR STOMACH Yes Audelia De Leon MD   denosumab (PROLIA) 60 MG/ML SOLN SC injection Inject 60 mg into the skin every 6 months Yes Historical Provider, MD   PROLENSA 0.07 % SOLN 1 drop daily  Yes Historical Provider, MD   bimatoprost (LUMIGAN) 0.03 % ophthalmic drops 1 drop nightly. Yes Historical Provider, MD   gabapentin (NEURONTIN) 100 MG capsule Take 100 mg by mouth as needed. Historical Provider, MD       Past Medical History:   Diagnosis Date    [facility]    s/p surgery     Cancer [facility])     breast    Chest pain     Diabetes mellitus ([facility]     non-insulin dependent    Diaphoresis     profuse    Family history of early CAD     Gastroesophageal reflux disease     H/O bicuspid aortic valve 5/19/2015    History of blood transfusion     History of phlebitis     Hx of blood clots     Hyperlipidemia     Hypertension     Moderate tricuspid regurgitation 01/12/2016    Osteoarthritis     [facility]    Valvular heart disease        Past Surgical History:   Procedure Laterality Date    AORTIC VALVE REPLACEMENT  07/28/2006    Aortic valve replacement with 21 mm Janett-Lozano pericardial tissue valve.   Rolan Klinefelter, M.D.   Shravan Rocha      left masectomy    CARDIOVERSION  01/14/2016    CATARACT REMOVAL      CHOLECYSTECTOMY      COLONOSCOPY      DIAGNOSTIC CARDIAC CATH LAB PROCEDURE  2006    left heart cath, left ventriculography and selective  coronary arteriography    EYE SURGERY      depression  Depression Interventions:  · We were already treating patient for depression. The diagnosis was just not in the problem list.      General Health and ACP:  General  In general, how would you say your health is?: Fair  In the past 7 days, have you experienced any of the following? New or Increased Pain, New or Increased Fatigue, Loneliness, Social Isolation, Stress or Anger?: (!) New or Increased Fatigue, Loneliness, Social Isolation  Do you get the social and emotional support that you need?: Yes  Do you have a Living Will?: Yes  Advance Directives     Power of  Living Will ACP-Advance Directive ACP-Power of     Not on File Filed on 01/13/16 Filed Not on File      General Health Risk Interventions:  · Fatigue: She has multiple health problems and has had to move to [place] to live with her daughter  · Loneliness: patient declines any further intervention for this issue  · Social isolation: patient declines any further intervention for this issue    Health Habits/Nutrition:  Health Habits/Nutrition  Do you exercise for at least 20 minutes 2-3 times per week?: (!) No  Have you lost any weight without trying in the past 3 months?: (!) Yes  Do you eat only one meal per day?: No  Have you seen the dentist within the past year?: N/A - wear dentures  Body mass index: (!) 26.86  Health Habits/Nutrition Interventions:  · She is unable to exercise. Wt Readings from Last 3 Encounters:   05/05/21 133 lb (60.3 kg)   01/27/21 142 lb 3.2 oz (64.5 kg)   11/04/20 156 lb (70.8 kg)   ·   ·       ADL:  ADLs  In the past 7 days, did you need help from others to perform any of the following everyday activities? Eating, dressing, grooming, bathing, toileting, or walking/balance?: (!) Walking/Balance  In the past 7 days, did you need help from others to take care of any of the following?  Laundry, housekeeping, banking/finances, shopping, telephone use, food preparation, transportation, or taking medications?: [employer], Transportation, Taking Medications  ADL Interventions:  · Patient declines any further evaluation/treatment for this issue  · Family members take care of all of these things. Personalized Preventive Plan   Current Health Maintenance Status  Immunization History   Administered Date(s) Administered    COVID-19, Moderna, PF, 100mcg/0.5mL 02/05/2021, 03/08/2021    Influenza Vaccine, unspecified formulation 10/07/2015    Influenza, High Dose (Fluzone 65 yrs and older) 11/01/2016, 10/06/2017    Influenza, Quadv, IM, (6 mo and older Fluzone, Flulaval, Fluarix and 3 yrs and older Afluria) 09/14/2018    Influenza, Quadv, IM, PF (6 mo and older Fluzone, Flulaval, Fluarix, and 3 yrs and older Afluria) 10/23/2020    Influenza, Triv, inactivated, subunit, adjuvanted, IM (Fluad 65 yrs and older) 09/23/2019    Pneumococcal Conjugate 13-valent ([id number]) 08/31/2016    Pneumococcal Polysaccharide ([id number]) 03/07/2018        Health Maintenance   Topic Date Due    Shingles Vaccine (1 of 2) Never done   [employer] Visit (AWV)  Never done    Lipid screen  09/25/2020    DTaP/Tdap/Td vaccine (1 - Tdap) 05/05/2022 (Originally 10/11/1954)    Potassium monitoring  01/27/2022    Creatinine monitoring  01/27/2022    DEXA (modify frequency per FRAX score)  Completed    Flu vaccine  Completed    Pneumococcal 65+ years Vaccine  Completed    COVID-19 Vaccine  Completed    Hepatitis A vaccine  Aged Out    Hib vaccine  Aged Out    Meningococcal (ACWY) vaccine  Aged Out     Recommendations for Tiqets Due: see orders and patient instructions/AVS.  . Recommended screening schedule for the next 5-10 years is provided to the patient in written form: see Patient Instructions/AVS.    Angela Fuentes was seen today for medicare awv and other.     Diagnoses and all orders for this visit:    Moderate depressive disorder    Persistent atrial fibrillation (HCC)    Type 2 diabetes mellitus without complication, without long-term current use of insulin (HCC)    Essential hypertension    Positive depression screening  -     Positive Screen for Clinical Depression with a Documented Follow-up Plan

## 2021-05-05 NOTE — PROGRESS NOTES
HISTORY  79-year-old lady with a history of dyslipidemia, diabetes, hypertension, prior [place] filter, chronic atrial fibrillation, and previous aortic valve replacement returns for routine follow-up visit. Bioprosthesis placed in the aortic position in July 2006 with follow-up echocardiogram most recently in April 2019 revealing normal LV systolic function normal bioprosthetic function, and moderate mitral regurgitation. 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 In September 2020 she had a reversible neurologic defect which prompted thorough evaluation at [facility], including a ERLIN, without source found. On return today she relates some symptoms suggestive of orthostasis along with some chronic fatigue. Reportedly her blood pressure was 142/94 this morning but here the first value was 86/44 and on repeat 94/56. PHYSICAL EXAM  On exam she carries 133 pounds in a 4 foot 11 inch frame. Pressure is 86/44 with an irregular pulse of 95. Kyphotic elderly lady examined in a chair because of inability to mount the exam table. EOMs full, sclera and conjunctiva normal.  No elevation central venous pressure at 90 degrees. Carotid upstrokes normal without delay or bruit. Chest clear to auscultation. Irregular heart sounds with a 1/6 systolic murmur heard at the base and along the left sternal border. EKG reveals atrial fibrillation with an occasional PVC along with an intraventricular conduction defect/right axis deviation, pretransitional Q/poor R wave progression consistent with prior anterior infarction, and diffuse nonspecific ST-T wave abnormalities. ASSESSMENT/PLAN:   1. Hypertension -pressures obviously low today with symptoms that suggest some difficulties with orthostasis. The metoprolol has dual purpose of both hypertensive coverage and to control her ventricular response rate. Consequently we will try holding her losartan for a week to check on her blood pressures under those circumstances. 2.  Aortic valve replacement -exam benign with last echo in 2019  3. Dyslipidemia -monitored and managed by primary care  4.   Pandemic response -appropriate/vaccinated

## 2021-05-05 NOTE — PATIENT INSTRUCTIONS
· Hold losartan for one week. · Check pressures off of losartan for one week. If the top number stays within 105-135, stay off of losartan. If your blood pressure rises, go back on losartan. · Try to avoid caffeine.

## 2021-05-05 NOTE — PROGRESS NOTES
SUBJECTIVE:   80 y.o. female for annual routine Pap and checkup. She actually came in for a Medicare annual wellness but she has multiple other problems that we follow including type 2 diabetes hypertension depression and mixed hyperlipidemia. She is scheduled to see Dr. Lila Maki today to discuss her chronic atrial fibrillation. She has had no further TIA like symptoms. She says she is losing weight but she is trying not to eat as much. She lives now with her daughter in [place]. She still has her little dog. She has had some falls but is using her walker now which helps. She does have occasional shortness of breath especially after she gets out of the shower and is trying to get dressed. She denies lightheadedness or chest pain with that. Resting usually relieves her symptoms. Her daughter is concerned because when she sits with her feet hanging down they often turn very dark. They are not painful. Occasionally she will have some numbness and tingling in her feet. It is very difficult to check her sugars because she just does not have a lot of blood in her fingers when they stick her fingers. She has had no episodes of hypoglycemia. She denies polyuria polyphagia or polydipsia. Her depression is under good control with Paxil. She does miss her family and friends in [place]. LMP: No LMP recorded. Patient has had a hysterectomy.   Patient Active Problem List    Diagnosis Date Noted    TIA (transient ischemic attack) 09/07/2020    History of adenomatous polyp of colon 09/13/2019    Breast cancer ([facility] [address] 07/10/2019    [place] filter in place 03/25/2019    Chronic atrial fibrillation ([address] 08/28/2018    Type 2 diabetes mellitus without complication, without long-term current use of insulin ([address] 03/07/2018    Essential hypertension 09/10/2017    Moderate tricuspid regurgitation 09/29/2016    H/O bicuspid aortic valve 05/19/2015    History of aortic stenosis 05/19/2015    Insomnia 01/22/2013    Family history of early CAD     S/P aortic valve replacement 10/11/2011    Hyperlipidemia 10/11/2011     Current Outpatient Medications   Medication Sig Dispense Refill    Calcium Lactate 100 MG TABS Take by mouth      PARoxetine (PAXIL) 20 MG tablet 1 tablet by mouth at bedtime for depression 90 tablet 3    gabapentin (NEURONTIN) 100 MG capsule Take 100 mg by mouth as needed for nerve pain from diabetes 30 capsule 2    losartan (COZAAR) 25 MG tablet TAKE ONE TABLET BY MOUTH EVERY DAY      Calcium Carbonate-Vitamin D (OYSTER SHELL CALCIUM/D) 500-200 MG-UNIT TABS Take 1 tablet by mouth daily 90 tablet 3    colesevelam (WELCHOL) 625 MG tablet Take 1 tablet by mouth 2 times daily (with meals) For diarrhea from gallbladder removal 180 tablet 3    metoprolol tartrate (LOPRESSOR) 25 MG tablet Take 25 mg by mouth 2 times daily      Probiotic Product (PROBIOTIC PO) Take by mouth daily      acetaminophen (TYLENOL) 325 MG tablet Take 650 mg by mouth every 6 hours as needed for Pain      atorvastatin (LIPITOR) 80 MG tablet Take 80 mg by mouth nightly      apixaban (ELIQUIS) 5 MG TABS tablet Take 5 mg by mouth every 12 hours      omeprazole (PRILOSEC) 40 MG delayed release capsule TAKE ONE CAPSULE BY MOUTH DAILY FOR STOMACH 90 capsule 3    denosumab (PROLIA) 60 MG/ML SOLN SC injection Inject 60 mg into the skin every 6 months      PROLENSA 0.07 % SOLN 1 drop daily       bimatoprost (LUMIGAN) 0.03 % ophthalmic drops 1 drop nightly.  COVID-19 mRNA Virus Vaccine (MODERNA COVID-19 VACCINE IM) Inject into the muscle Both doses. No current facility-administered medications for this visit.       Past Medical History:   Diagnosis Date    A-fib [facility]) 07/28/2006    s/p surgery     Cancer [facility])     breast    Chest pain     Diabetes mellitus ([facility] [address]     non-insulin dependent    Diaphoresis     profuse    Family history of early CAD     Gastroesophageal reflux disease     H/O bicuspid aortic valve 5/19/2015    History of blood transfusion     History of phlebitis     Hx of blood clots     Hyperlipidemia     Hypertension     Moderate tricuspid regurgitation 01/12/2016    Osteoarthritis     Stroke ([address] 09/2020    Valvular heart disease      Past Surgical History:   Procedure Laterality Date    AORTIC VALVE REPLACEMENT  07/28/2006    Aortic valve replacement with 21 mm Janett-Lozano pericardial tissue valve. HERMAN Pagan      left masectomy    CARDIOVERSION  01/14/2016    CATARACT REMOVAL      CHOLECYSTECTOMY      COLONOSCOPY      DIAGNOSTIC CARDIAC CATH LAB PROCEDURE  2006    left heart cath, left ventriculography and selective  coronary arteriography    EYE SURGERY      HYSTERECTOMY, TOTAL ABDOMINAL      patient doesn't know if she has her ovaries    JOINT REPLACEMENT      TONSILLECTOMY AND ADENOIDECTOMY      TOTAL KNEE ARTHROPLASTY      bilateral total knee replacement     Family History   Problem Relation Age of Onset    Arthritis Mother     Heart Disease Mother     High Blood Pressure Mother     Heart Disease Father      Social History     Tobacco Use    Smoking status: Never Smoker    Smokeless tobacco: Never Used   Substance Use Topics    Alcohol use: No       Allergies: Penicillins and Sulfa antibiotics    ROS:  Feeling well. No dyspnea or chest pain on exertion. No abdominal pain, change in bowel habits, black or bloody stools. No urinary tract symptoms. GYN ROS: none   No neurological complaints. All other systems negative. OBJECTIVE:   The patient appears well, alert, oriented x 3, in no distress. BP (!) 142/94 (Site: Left Upper Arm, Position: Sitting, Cuff Size: Large Adult)   Pulse 88   Temp 97.9 °F (36.6 °C) (Temporal)   Resp 16   Ht 4' 11\" (1.499 m)   Wt 133 lb (60.3 kg)   SpO2 99%   BMI 26.86 kg/m²   Skin normal, no suspicious skin lesions.   Slightly pale ENT including eating healthy, weight loss and exercise. She does not meet the criteria for diabetic shoes. Because she is under fairly good control I suggested they might just want to check her sugars 3 times a week in the morning or if she is feeling bad. She does have a history of depression and we are treating her for this. She does not feel that it is getting worse. We will contact her with results of her blood work. I did suggest that she see a podiatrist in [place] every 3 or 4 months for nail and foot care. She is at risk for falls and she should continue to use her Rollator. We will contact Dr. Loida Justin and see if they can see her today since she lives 3 or 4 hours away. He is treating her paroxysmal atrial fibrillation. She is not using the gabapentin every night but if she is having great trouble sleeping due to burning in her feet she will take it. I do not feel that she is abusing this medication. Follow-up:  Return in 6 months (on 11/5/2021) for Diabetes recheck. PATIENT INSTRUCTIONS:  Patient Instructions   We are committed to providing you with the best care possible. In order to help us achieve these goals please remember to bring all medications, herbal products, and over the counter supplements with you to each visit. If your provider has ordered testing for you, please be sure to follow up with our office if you have not received results within 7 days after the testing took place. *If you receive a survey after visiting one of our offices, please take time to share your experience concerning your physician office visit. These surveys are confidential and no health information about you is shared. We are eager to improve for you and we are counting on your feedback to help make that happen. Personalized Preventive Plan for Idalia Saini - 5/5/2021  Medicare offers a range of preventive health benefits.  Some of the tests and screenings are paid in full while may help keeps it in top shape. Good mental exercises include:   Crossword puzzlesUse a dictionary if you need it; you will learn more that way. Brainteasers Try some! Crafts, such as wood working and sewing   Hobbies, such as gardening and building model airplanes   SocializingVisit old friends or join groups to meet new ones. Reading   Learning a new language   Taking a class, whether it be art history or jose manuel chi   TravelingExperience the food, history, and culture of your destination   Learning to use a computer   Going to museums, the theater, or thought-provoking movies   Changing things in your daily life, such as reversing your pattern in the grocery store or brushing your teeth using your nondominant hand   Use Memory Aids   There is no need to remember every detail on your own. These memory aids can help:   Calendars and day planners   Electronic organizers to store all sorts of helpful informationThese devices can \"beep\" to remind you of appointments. A book of days to record birthdays, anniversaries, and other occasions that occur on the same date every year   Detailed \"to-do\" lists and strategically placed sticky notes   Quick \"study\" sessionsBefore a gathering, review who will be there so their names will be fresh in your mind. Establish routinesFor example, keep your keys, wallet, and umbrella in the same place all the time or take medicine with your 8:00 AM glass of juice   Live a Healthy Life   Many actions that will keep your body strong will do the same for your mind. For example:   Talk to Your Doctor About Herbs and Supplements    Malnutrition and vitamin deficiencies can impair your mental function. For example, vitamin B12 deficiency can cause a range of symptoms, including confusion. But, what if your nutritional needs are being met? Can herbs and supplements still offer a benefit?  Researchers have investigated a range of natural remedies, such as ginkgo , ginseng , and the traffic and maintained in good condition? Have frayed cords been replaced? Are all small rugs and runners slip resistant? If not, you can secure them to the floor with a special double-sided carpet tape. Are smoke detectors properly locatedone on every floor of your home and one outside of every sleeping area? Are they in good working order? Are batteries replaced at least once a year? Do you have a well-maintained carbon monoxide detector outside every sleeping are in your home? Does your furniture layout leave plenty of space to maneuver between and around chairs, tables, beds, and sofas? Are hallways, stairs and passages between rooms well lit? Can you reach a lamp without getting out of bed? Are floor surfaces well maintained? Shag rugs, high-pile carpeting, tile floors, and polished wood floors can be particularly slippery. Stairs should always have handrails and be carpeted or fitted with a non-skid tread. Is your telephone easily reachable. Is the cord safely tucked away? Room by Room   According to the Association of Aging, bathrooms and [place] are the two most potentially hazardous rooms in your home. In the Kitchen    Be sure your stove is in proper working order and always make sure burners and the oven are off before you go out or go to sleep. Keep pots on the back burners, turn handles away from the front of the stove, and keep stove clean and free of grease build-up. Kitchen ventilation systems and range exhausts should be working properly. Keep flammable objects such as towels and pot holders away from the cooking area except when in use. Make sure kitchen curtains are tied back. Move cords and appliances away from the sink and hot surfaces. If extension cords are needed, install wiring guides so they do not hang over the sink, range, or working areas. Look for coffee pots, kettles and toaster ovens with automatic shut-offs.     Keep a mop handy in the kitchen so you can wipe up spills instantly. You should also have a small fire extinguisher. Arrange your kitchen with frequently used items on lower shelves to avoid the need to stand on a stepstool to reach them. Make sure countertops are well-lit to avoid injuries while cutting and preparing food. In the Bathroom    Use a non-slip mat or decals in the tub and shower, since wet, soapy tile or porcelain surfaces are extremely slippery. Make sure bathroom rugs are non-skid or tape them firmly to the floor. Bathtubs should have at least one, preferably two, grab bars, firmly attached to structural supports in the wall. (Do not use built-in soap holders or glass shower doors as grab bars.)    Tub seats fitted with non-slip material on the legs allow you to wash sitting down. For people with limited mobility, bathtub transfer benches allow you to slide safely into the tub. Raised toilet seats and toilet safety rails are helpful for those with knee or hip problems. In the [facility]    Make sure you use a nightlight and that the area around your bed is clear of potential obstacles. Be careful with electric blankets and never go to sleep with a heating pad, which can cause serious burns even if on a low setting. Use fire-resistant mattress covers and pillows, and NEVER smoke in bed. Keep a phone next to the bed that is programmed to dial 911 at the push of a button. If you have a chronic condition, you may want to sign on with an automatic call-in service. Typically the system includes a small pendant that connects directly to an emergency medical voice-response system. You should also make arrangements to stay in contact with someonefriend, neighbor, family memberon a regular schedule. Fire Prevention   According to the [employer] (Smoke Alarms for Every) [facility], senior citizens are one of the two highest risk groups for death and serious injuries due to residential fires.    When cooking, wear short-sleeved items, never a bulky long-sleeved robe. The [facility]'s Safety Checklist for Older Consumers emphasizes the importance of checking basements, garages, workshops and storage areas for fire hazards, such as volatile liquids, piles of old rags or clothing and overloaded circuits. Never smoke in bed or when lying down on a couch or recliner chair. Small portable electric or kerosene heaters are responsible for many home fires and should be used cautiously if at all. If you do use one, be sure to keep them away from flammable materials. In case of fire, make sure you have a pre-established emergency exit plan. Have a professional check your fireplace and other fuel-burning appliances yearly. Helping Hands   Baby boomers entering the alfaro years will continue to see the development of new products to help older adults live safely and independently in spite of age-related changes. Making Life More Livable  , by Bran Willis, lists over 1,000 products for \"living well in the mature years,\" such as bathing and mobility aids, household security devices, ergonomically designed knives and peelers, and faucet valves and knobs for temperature control. Medical supply stores and organizations are good sources of information about products that improve your quality of life and insure your safety. Last Reviewed: November 2009 Jeanette Roberto MD   Updated: 3/7/2011     ·         EMR Dragon/transcription disclaimer:  Much of this encounter note is electronic transcription/translation of spoken language to printed texts. The electronic translation of spoken language may be erroneous, or at times, nonsensical words or phrases may be inadvertently transcribed.   Although I have reviewed the note for such errors, some may still exist.

## 2021-05-10 ENCOUNTER — TELEPHONE (OUTPATIENT)
Dept: PRIMARY CARE CLINIC | Age: 86
End: 2021-05-10

## 2021-05-10 NOTE — TELEPHONE ENCOUNTER
Okay.  We will send her a correct 1 the just says 100 mg periodically.   Unfortunately even that low overdose is a controlled substance

## 2021-05-10 NOTE — TELEPHONE ENCOUNTER
Pt states she received the letter about the Gabapentin 300 mg. Pt states she takes 100 mg and very rarely. She states she isn't going to fill out the med agreement.

## 2021-05-12 ENCOUNTER — CARE COORDINATION (OUTPATIENT)
Dept: CARE COORDINATION | Age: 86
End: 2021-05-12

## 2021-05-12 ENCOUNTER — TELEPHONE (OUTPATIENT)
Dept: CARDIOLOGY CLINIC | Age: 86
End: 2021-05-12

## 2021-05-12 DIAGNOSIS — I48.19 PERSISTENT ATRIAL FIBRILLATION (HCC): Primary | ICD-10-CM

## 2021-05-12 DIAGNOSIS — I10 ESSENTIAL HYPERTENSION: ICD-10-CM

## 2021-05-12 RX ORDER — METOPROLOL SUCCINATE 25 MG/1
25 TABLET, EXTENDED RELEASE ORAL 2 TIMES DAILY
Qty: 60 TABLET | Refills: 3 | Status: SHIPPED | OUTPATIENT
Start: 2021-05-12 | End: 2021-09-08 | Stop reason: SDUPTHER

## 2021-05-12 RX ORDER — METOPROLOL SUCCINATE 100 MG/1
100 TABLET, EXTENDED RELEASE ORAL DAILY
COMMUNITY
End: 2021-05-12 | Stop reason: DRUGHIGH

## 2021-05-12 NOTE — CARE COORDINATION
Ambulatory Care Coordination Note  5/12/2021  CM Risk Score: 1  Charlson 10 Year Mortality Risk Score: 100%     ACC: Georgette Bejarano, RN    Summary Note: ACM spoke to Liana Bishop:  Pt continues unable to easily check her blood sugars. A1c last week was 5.8. Liana Bishop stated Chalino Alanis got new refill of Gabapentin but only takes this intermittently and asked if this is OK to do. Pt saw her PCP and cardiologist last week. Liana Bishop is unsure of which provider that wanted her to stop Losartan for 1 week. Pt did stop the medication. One of Saundra's AVS papers from 5/5/21 included BP of 142/94, and the other AVS showed very diff BP reading (low). First day off Losartan was 5/5/21. Plan:  Reta Fam that it is OK to take low dose Gabapentin just if needed. She may refuse refills or ask Rx to put it on hold until needed. She vu. ACM reviewed OV notes from 5/5 and verified patient had 2 very low BP readings at cardiologist appt. Dr. Keri Richard and instructions were to stop Losartan and check BP for a week while off med. If systolic savage above 825, pt was to resume med. Liana Bishop provided her home BP checks:  Friday: 80/52 HR 85  Monday (5/10): 88/55. HR 70  Today: 83/54. HR 69  ACM advised that Saundra continue to stay off Losartan and continue to check BP. Asked if patient has c/o symptoms and Liana Bishop stated only that she is tired all the time and this is not new. ACM instructed that Liana Bishop ask pt specifically if she gets dizzy or faint, short of air when up to the bathroom or when she stands after sitting/laying down. Liana Bishop voiced understanding. Reta Fam to notify cardiologist of her BP numbers and AC also sent log to MA for Dr. Anatoly Castro. Care Coordination Interventions    Program Enrollment: Complex Care  Referral from Primary Care Provider: No  Suggested Interventions and Community Resources  Medi Set or Pill Pack: Completed (Comment: Dtr, Liana Bishop, assures patient is med compliant)  Zone Management Tools:  In Process (Comment: 5/12: Dtr reported low BP readings and pt has been off Losartan 1 wk per cardio inst. Inst to call cardio and report numbers. ACM sent log by basket msg in EPIC to MA for Dr. Ada Sharif.)         Goals Addressed                 This Visit's Progress     Self Monitoring   No change     Self-Monitored Blood Glucose - I will check my blood sugar Fasting blood sugar  I will notify my provider of any trends of increasing or decreasing blood sugars over a 1 month period. I will notify my provider if I have any blood sugar readings less than 70 more than 2 times a month. Daily Weights - I will weight myself as directed - Daily and write down weights  I will notify my provider of any increase in weight by 3 or more pounds in 2 days OR 5 or more pounds in a week. Patient Reported Weight No flowsheet data found. Barriers: lack of support and lack of education  Plan for overcoming my barriers:   Daughter partnering with ACM to monitor daily FBS and daily wgt  Daughter support of patient through use of Ensure w recipes, transporting to appts, notifying provider for concerning symptoms. Confidence: 9/10  Anticipated Goal Completion Date: 6/7/21              Prior to Admission medications    Medication Sig Start Date End Date Taking?  Authorizing Provider   PARoxetine (PAXIL) 20 MG tablet 1 tablet by mouth at bedtime for depression 5/5/21  Yes Holden Swanson MD   gabapentin (NEURONTIN) 100 MG capsule Take 100 mg by mouth as needed for nerve pain from diabetes 5/5/21 11/5/21 Yes Holden Swanson MD   Calcium Carbonate-Vitamin D (OYSTER SHELL CALCIUM/D) 500-200 MG-UNIT TABS Take 1 tablet by mouth daily 1/27/21 1/27/22 Yes Anastacio Garibay PA-C   colesevelam (WELCHOL) 625 MG tablet Take 1 tablet by mouth 2 times daily (with meals) For diarrhea from gallbladder removal 1/13/21  Yes Holden Swanson MD   metoprolol tartrate (LOPRESSOR) 25 MG tablet Take 25 mg by mouth 2 times daily   Yes Historical Provider, MD   Probiotic Product (PROBIOTIC PO) Take by mouth daily   Yes Historical Provider, MD   acetaminophen (TYLENOL) 325 MG tablet Take 650 mg by mouth every 6 hours as needed for Pain   Yes Historical Provider, MD   atorvastatin (LIPITOR) 80 MG tablet Take 80 mg by mouth nightly 9/9/20 9/10/21 Yes Historical Provider, MD   apixaban (ELIQUIS) 5 MG TABS tablet Take 5 mg by mouth every 12 hours 9/9/20 9/10/21 Yes Historical Provider, MD   omeprazole (PRILOSEC) 40 MG delayed release capsule TAKE ONE CAPSULE BY MOUTH DAILY FOR STOMACH 4/15/20  Yes Salvatore Dudley MD   denosumab (PROLIA) 60 MG/ML SOLN SC injection Inject 60 mg into the skin every 6 months   Yes Historical Provider, MD   bimatoprost (LUMIGAN) 0.03 % ophthalmic drops 1 drop nightly. Yes Historical Provider, MD   Calcium Lactate 100 MG TABS Take by mouth    Historical Provider, MD   COVID-19 mRNA Virus Vaccine (MODERNA COVID-19 VACCINE IM) Inject into the muscle Both doses. Historical Provider, MD   losartan (COZAAR) 25 MG tablet TAKE ONE TABLET BY MOUTH EVERY DAY 11/30/20   Historical Provider, MD   PROLENSA 0.07 % SOLN 1 drop daily  12/8/14   Historical Provider, MD       Future Appointments   Date Time Provider Sherice Nascimento   7/27/2021  9:50 AM [facility] MAMMO RM 1 [facility] WOMENS [facility]   7/27/2021 11:00 AM [facility]   7/27/2021 11:30 AM SCHEDULE, [facility] MED ONC [facility] MED ONC [facility]   11/10/2021 10:45 AM Sunil Fairchild MD LPS Kalpana Ruano [facility]   11/10/2021  3:00 PM Abilio Liriano MD [facility]      and   Diabetes Assessment    Medic Alert ID: No  Meal Planning: None   How often do you test your blood sugar?: No Testing   Do you have barriers with adherence to non-pharmacologic self-management interventions?  (Nutrition/Exercise/Self-Monitoring): Yes   Have you ever had to go to the ED for symptoms of low blood sugar?: No       No patient-reported symptoms

## 2021-05-12 NOTE — TELEPHONE ENCOUNTER
Patient's daughter called in to report BP since Dr. Dale Soria had her hold losartan. She stated it's running 80s/50s and patient is asymptomatic. She is asking if there's anything else they need to do.

## 2021-05-21 ENCOUNTER — CARE COORDINATION (OUTPATIENT)
Dept: CARE COORDINATION | Age: 86
End: 2021-05-21

## 2021-05-21 NOTE — CARE COORDINATION
sugar Fasting blood sugar  I will notify my provider of any trends of increasing or decreasing blood sugars over a 1 month period. I will notify my provider if I have any blood sugar readings less than 70 more than 2 times a month. Daily Weights - I will weight myself as directed - Daily and write down weights  I will notify my provider of any increase in weight by 3 or more pounds in 2 days OR 5 or more pounds in a week. Patient Reported Weight No flowsheet data found. Barriers: lack of support and lack of education  Plan for overcoming my barriers:   Daughter partnering with ACM to monitor daily FBS and daily wgt  Daughter support of patient through use of Ensure w recipes, transporting to appts, notifying provider for concerning symptoms. Confidence: 9/10  Anticipated Goal Completion Date: 6/7/21              Prior to Admission medications    Medication Sig Start Date End Date Taking?  Authorizing Provider   metoprolol succinate (TOPROL XL) 25 MG extended release tablet Take 1 tablet by mouth 2 times daily 5/12/21  Yes Jarrod Gutiérrez MD   Calcium Lactate 100 MG TABS Take by mouth   Yes Historical Provider, MD   PARoxetine (PAXIL) 20 MG tablet 1 tablet by mouth at bedtime for depression 5/5/21  Yes Carmela Eng MD   gabapentin (NEURONTIN) 100 MG capsule Take 100 mg by mouth as needed for nerve pain from diabetes 5/5/21 11/5/21 Yes Carmela Eng MD   losartan (COZAAR) 25 MG tablet TAKE ONE TABLET BY MOUTH EVERY DAY 11/30/20  Yes Historical Provider, MD   Calcium Carbonate-Vitamin D (OYSTER SHELL CALCIUM/D) 500-200 MG-UNIT TABS Take 1 tablet by mouth daily 1/27/21 1/27/22 Yes Jadiel Weaver PA-C   colesevelam (WELCHOL) 625 MG tablet Take 1 tablet by mouth 2 times daily (with meals) For diarrhea from gallbladder removal 1/13/21  Yes Carmela Eng MD   Probiotic Product (PROBIOTIC PO) Take by mouth daily   Yes Historical Provider, MD   acetaminophen (TYLENOL) 325 MG tablet Take 650 mg by mouth every 6

## 2021-06-03 ENCOUNTER — CARE COORDINATION (OUTPATIENT)
Dept: CARE COORDINATION | Age: 86
End: 2021-06-03

## 2021-06-03 RX ORDER — OMEPRAZOLE 40 MG/1
CAPSULE, DELAYED RELEASE ORAL
Qty: 90 CAPSULE | Refills: 3 | Status: SHIPPED | OUTPATIENT
Start: 2021-06-03 | End: 2022-04-26

## 2021-06-03 NOTE — CARE COORDINATION
Returned call to dtr/caregiver, Blue Leiva. She stated she called about a medication prescribed by Dr. Lennox Ser. Pt is needing refill of medication. She called Dr. Angi Segura office, said she called [phone] and was not able to get through and leave refill request with the office. She stated she decided to call this ACM for assistance. Plan:  [facility] sent basket message to [place] for Dr. Bueno Necessary - advised patient needs renewed prescription for her Omeprazole, her current prescription refills have .   Electronically signed by Xu Hutchinson RN on 6/3/2021 at 9:09 AM

## 2021-09-01 DIAGNOSIS — C50.919 MALIGNANT NEOPLASM OF FEMALE BREAST, UNSPECIFIED ESTROGEN RECEPTOR STATUS, UNSPECIFIED LATERALITY, UNSPECIFIED SITE OF BREAST (HCC): Primary | ICD-10-CM

## 2021-09-07 ENCOUNTER — HOSPITAL ENCOUNTER (OUTPATIENT)
Dept: INFUSION THERAPY | Age: 86
Discharge: HOME | End: 2021-09-07
Payer: MEDICARE

## 2021-09-07 ENCOUNTER — OFFICE VISIT (OUTPATIENT)
Dept: HEMATOLOGY | Age: 86
End: 2021-09-07
Payer: MEDICARE

## 2021-09-07 ENCOUNTER — HOSPITAL ENCOUNTER (OUTPATIENT)
Dept: WOMENS IMAGING | Age: 86
Discharge: HOME | End: 2021-09-07
Payer: MEDICARE

## 2021-09-07 VITALS
HEART RATE: 71 BPM | WEIGHT: 132 LBS | SYSTOLIC BLOOD PRESSURE: 126 MMHG | DIASTOLIC BLOOD PRESSURE: 64 MMHG | OXYGEN SATURATION: 99 % | HEIGHT: 59 IN | BODY MASS INDEX: 26.61 KG/M2

## 2021-09-07 DIAGNOSIS — Z12.31 ENCOUNTER FOR SCREENING MAMMOGRAM FOR BREAST CANCER: ICD-10-CM

## 2021-09-07 DIAGNOSIS — D50.9 MICROCYTIC HYPOCHROMIC ANEMIA: Primary | ICD-10-CM

## 2021-09-07 DIAGNOSIS — C50.919 MALIGNANT NEOPLASM OF FEMALE BREAST, UNSPECIFIED ESTROGEN RECEPTOR STATUS, UNSPECIFIED LATERALITY, UNSPECIFIED SITE OF BREAST (HCC): ICD-10-CM

## 2021-09-07 LAB
BASOPHILS ABSOLUTE: 0.04 K/UL (ref 0.01–0.08)
BASOPHILS RELATIVE PERCENT: 0.4 % (ref 0.1–1.2)
EOSINOPHILS ABSOLUTE: 0.09 K/UL (ref 0.04–0.54)
EOSINOPHILS RELATIVE PERCENT: 0.8 % (ref 0.7–7)
HCT VFR BLD CALC: 26.6 % (ref 34.1–44.9)
HEMOGLOBIN: 7.8 G/DL (ref 11.2–15.7)
LYMPHOCYTES ABSOLUTE: 3.32 K/UL (ref 1.18–3.74)
LYMPHOCYTES RELATIVE PERCENT: 30.8 % (ref 19.3–53.1)
MCH RBC QN AUTO: 25.2 PG (ref 25.6–32.2)
MCHC RBC AUTO-ENTMCNC: 29.3 G/DL (ref 32.3–35.5)
MCV RBC AUTO: 85.8 FL (ref 79.4–94.8)
MONOCYTES ABSOLUTE: 1.03 K/UL (ref 0.24–0.82)
MONOCYTES RELATIVE PERCENT: 9.6 % (ref 4.7–12.5)
NEUTROPHILS ABSOLUTE: 6.3 K/UL (ref 1.56–6.13)
NEUTROPHILS RELATIVE PERCENT: 58.4 % (ref 34–71.1)
PDW BLD-RTO: 14.4 % (ref 11.7–14.4)
PLATELET # BLD: 233 K/UL (ref 182–369)
PMV BLD AUTO: 10 FL (ref 7.4–10.4)
RBC # BLD: 3.1 M/UL (ref 3.93–5.22)
WBC # BLD: 10.78 K/UL (ref 3.98–10.04)

## 2021-09-07 PROCEDURE — 85025 COMPLETE CBC W/AUTO DIFF WBC: CPT

## 2021-09-07 PROCEDURE — 99214 OFFICE O/P EST MOD 30 MIN: CPT | Performed by: PHYSICIAN ASSISTANT

## 2021-09-07 PROCEDURE — G8399 PT W/DXA RESULTS DOCUMENT: HCPCS | Performed by: PHYSICIAN ASSISTANT

## 2021-09-07 PROCEDURE — 4040F PNEUMOC VAC/ADMIN/RCVD: CPT | Performed by: PHYSICIAN ASSISTANT

## 2021-09-07 PROCEDURE — 1123F ACP DISCUSS/DSCN MKR DOCD: CPT | Performed by: PHYSICIAN ASSISTANT

## 2021-09-07 PROCEDURE — G8427 DOCREV CUR MEDS BY ELIG CLIN: HCPCS | Performed by: PHYSICIAN ASSISTANT

## 2021-09-07 PROCEDURE — G8417 CALC BMI ABV UP PARAM F/U: HCPCS | Performed by: PHYSICIAN ASSISTANT

## 2021-09-07 PROCEDURE — 1090F PRES/ABSN URINE INCON ASSESS: CPT | Performed by: PHYSICIAN ASSISTANT

## 2021-09-07 PROCEDURE — 1036F TOBACCO NON-USER: CPT | Performed by: PHYSICIAN ASSISTANT

## 2021-09-07 PROCEDURE — 77067 SCR MAMMO BI INCL CAD: CPT

## 2021-09-07 PROCEDURE — 99212 OFFICE O/P EST SF 10 MIN: CPT

## 2021-09-07 PROCEDURE — 36415 COLL VENOUS BLD VENIPUNCTURE: CPT

## 2021-09-07 ASSESSMENT — ENCOUNTER SYMPTOMS
ABDOMINAL PAIN: 0
BLOOD IN STOOL: 0
DIARRHEA: 1
PHOTOPHOBIA: 0
SORE THROAT: 0
VOICE CHANGE: 0
BACK PAIN: 1
VOMITING: 0
CONSTIPATION: 0
COUGH: 0
EYE ITCHING: 0
TROUBLE SWALLOWING: 0
WHEEZING: 0
NAUSEA: 0
COLOR CHANGE: 0
EYE DISCHARGE: 0
ABDOMINAL DISTENTION: 0
SHORTNESS OF BREATH: 1

## 2021-09-07 NOTE — PROGRESS NOTES
Progress Note      Pt Name: Maria Teresa Gomez: 1935  MRN: [id number]    Date of evaluation: 9/7/2021  History Obtained From:  patient, electronic medical record    CHIEF COMPLAINT:    Chief Complaint   Patient presents with    Follow-up     Malignant neoplasm of female breast, unspecified estrogen receptor status, unspecified laterality, unspecified site of breast ([facility] [address]     Current active problems  Resected Stage II left breast cancer   Senile osteoperosis    HISTORY OF PRESENT ILLNESS:    Carolina Horner is a 80 y.o.  female with a history of resected left stage II breast carcinoma dating back to 12/27/2012. She was ER CT negative. She was HER-2 positive. She completed adjuvant chemotherapy and Herceptin- for a year. She denies any new nodules on her chest wall. She had a mammogram performed earlier today. She has relocated and is now living with her daughter Juan Ahuja in [place]. She has elected to come back to the office for her Prolia and to be seen twice a year. She has full dentures so she denies any jaw pain. She has been suffering from progressive weakness-dyspnea with exertion. She denies any bleeding whatsoever. She denies any melena, hematochezia, hematemesis, hematuria. She reports she is having loose stools 2-3 times a day. She reports that her bowel movement looks \"great sometimes\". She has history of nonhemorrhagic CVA. She does have history of back fibrillation is on Eliquis 5 twice daily. She has not had any new neurologic issues. She continues taking omeprazole daily without any new GERD symptoms. Blood pressure apparently has been doing okay on her current medication. She does continue taking atorvastatin for cholesterol.   She reports that she has not been taking her calcium and vitamin D.      TUMOR HISTORY: Left stage IIA (T2 N0 M0), ER/CT negative, Her2 Trudi positive breast cancer, 12/27/12  Saundra was seen in initial oncology consultation on 01/17/13, referred by Dr. Tangela Olivera with regard to a diagnosis of left breast cancer. Her history dates back to just before Thanksgiving when she noticed a lump in her left breast. A mammogram was done early on 11/20/12 that revealed abnormalities in the left breast in the left lower outer quadrant. She has undergone a left modified radical mastectomy with axillary lymph node dissection by Dr. Rich Cadet on 12/27/12 that reveals a 3.3 cm left breast mass. ER and [place] are both negative. Her2 Trudi is 3+ by IHC and amplified by FISH at 6.3. All of the axillary lymph nodes were negative. Margins were clear. Metastatic workup including CT scans of the chest, abdomen and pelvis, MRI of the brain and bone scan were all negative for metastatic disease. Adjuvant chemotherapy including Herceptin was recommended. Her left ventricular ejection fraction on 2D echocardiogram on 12/28/12 was 65%. Chemotherapy including Taxotere, Cytoxan and Herceptin were delivered as outlined below. TREATMENT SUMMARY:  1. Left modified radical mastectomy 12/27/12. 2. Taxotere, Cytoxan and Herceptin chemotherapy, initiated 02/28/13. She completed Taxotere/cytoxan x 6 cycles on 6/28/2013. 3. She continued on weekly herceptin with her final 52nd dose given on 04/04/14.         Past Medical History:   Diagnosis Date    A-fib [facility]) 07/28/2006    s/p surgery     Breast cancer ([facility] [address] 2006    Cancer [facility])     breast    Chest pain     Diabetes mellitus ([facility] [address]     non-insulin dependent    Diaphoresis     profuse    Family history of early CAD     Gastroesophageal reflux disease     H/O bicuspid aortic valve 5/19/2015    History of blood transfusion     History of phlebitis     Hx of blood clots     Hyperlipidemia     Hypertension     Moderate tricuspid regurgitation 01/12/2016    Osteoarthritis     Stroke ([facility] [address] 09/2020    Valvular heart disease         Past Surgical History:   Procedure Laterality Date    AORTIC VALVE REPLACEMENT  07/28/2006    Aortic valve replacement with 21 mm Janett-Lozano pericardial tissue valve. Chica Sharma M.D.   Sergo Iain BIOPSY Left 2006    BREAST SURGERY  2006    left masectomy    CARDIOVERSION  01/14/2016    CATARACT REMOVAL      CHOLECYSTECTOMY      COLONOSCOPY      DIAGNOSTIC CARDIAC CATH LAB PROCEDURE  2006    left heart cath, left ventriculography and selective  coronary arteriography    EYE SURGERY      HYSTERECTOMY, TOTAL ABDOMINAL  1986    patient doesn't know if she has her ovaries    JOINT REPLACEMENT      MASTECTOMY Left 2006    TONSILLECTOMY AND ADENOIDECTOMY      TOTAL KNEE ARTHROPLASTY      bilateral total knee replacement           Current Outpatient Medications:     omeprazole (PRILOSEC) 40 MG delayed release capsule, TAKE ONE CAPSULE BY MOUTH DAILY FOR STOMACH, Disp: 90 capsule, Rfl: 3    metoprolol succinate (TOPROL XL) 25 MG extended release tablet, Take 1 tablet by mouth 2 times daily, Disp: 60 tablet, Rfl: 3    Calcium Lactate 100 MG TABS, Take by mouth, Disp: , Rfl:     COVID-19 mRNA Virus Vaccine (MODERNA COVID-19 VACCINE IM), Inject into the muscle Both doses. , Disp: , Rfl:     PARoxetine (PAXIL) 20 MG tablet, 1 tablet by mouth at bedtime for depression, Disp: 90 tablet, Rfl: 3    losartan (COZAAR) 25 MG tablet, TAKE ONE TABLET BY MOUTH EVERY DAY, Disp: , Rfl:     Calcium Carbonate-Vitamin D (OYSTER SHELL CALCIUM/D) 500-200 MG-UNIT TABS, Take 1 tablet by mouth daily, Disp: 90 tablet, Rfl: 3    colesevelam (WELCHOL) 625 MG tablet, Take 1 tablet by mouth 2 times daily (with meals) For diarrhea from gallbladder removal, Disp: 180 tablet, Rfl: 3    Probiotic Product (PROBIOTIC PO), Take by mouth daily, Disp: , Rfl:     acetaminophen (TYLENOL) 325 MG tablet, Take 650 mg by mouth every 6 hours as needed for Pain, Disp: , Rfl:     atorvastatin (LIPITOR) 80 MG tablet, Take 80 mg by mouth nightly, Disp: , Rfl:     apixaban (ELIQUIS) 5 MG TABS tablet, Take 5 mg by mouth every 12 hours, Disp: , Rfl:     denosumab (PROLIA) 60 MG/ML SOLN SC injection, Inject 60 mg into the skin every 6 months, Disp: , Rfl:     PROLENSA 0.07 % SOLN, 1 drop daily , Disp: , Rfl:     bimatoprost (LUMIGAN) 0.03 % ophthalmic drops, 1 drop nightly.  , Disp: , Rfl:     gabapentin (NEURONTIN) 100 MG capsule, Take 100 mg by mouth as needed for nerve pain from diabetes (Patient not taking: Reported on 9/7/2021), Disp: 30 capsule, Rfl: 2     Allergies   Allergen Reactions    Penicillins      Just does not work    Sulfa Antibiotics Rash       Social History     Tobacco Use    Smoking status: Never Smoker    Smokeless tobacco: Never Used   Vaping Use    Vaping Use: Never used   Substance Use Topics    Alcohol use: No    Drug use: No       Family History   Problem Relation Age of Onset    Arthritis Mother     Heart Disease Mother     High Blood Pressure Mother     Heart Disease Father        Subjective   REVIEW OF SYSTEMS:   Review of Systems   Constitutional: Positive for fatigue (Moderate). Negative for chills, diaphoresis, fever and unexpected weight change. HENT: Negative for mouth sores, nosebleeds, sore throat, trouble swallowing and voice change. Eyes: Negative for photophobia, discharge and itching. Respiratory: Positive for shortness of breath (With exertion). Negative for cough and wheezing. Cardiovascular: Positive for palpitations (On occasion but rare). Negative for chest pain and leg swelling. Gastrointestinal: Positive for diarrhea. Negative for abdominal distention, abdominal pain, blood in stool (Adamantly denies), constipation, nausea and vomiting. Endocrine: Negative for cold intolerance, heat intolerance, polydipsia and polyuria. Genitourinary: Negative for difficulty urinating, dysuria, hematuria (Adamantly denies) and urgency. Musculoskeletal: Positive for arthralgias and back pain.  Negative for joint swelling and myalgias. Skin: Negative for color change and rash. Neurological: Positive for weakness (Left upper extremity). Negative for dizziness, tremors, seizures, syncope and light-headedness. Hematological: Negative for adenopathy. Does not bruise/bleed easily. Psychiatric/Behavioral: Negative for behavioral problems and suicidal ideas. The patient is not nervous/anxious. All other systems reviewed and are negative. Objective   /64   Pulse 71   Ht 4' 11\" (1.499 m)   Wt 132 lb (59.9 kg)   SpO2 99%   BMI 26.66 kg/m²     Wt Readings from Last 3 Encounters:   09/07/21 132 lb (59.9 kg)   05/05/21 133 lb (60.3 kg)   05/05/21 133 lb (60.3 kg)         PHYSICAL EXAM:  Physical Exam  Exam conducted with a chaperone present Rosalinda Wartrace her daughter). Constitutional:       Appearance: She is well-developed. Comments: Chronically ill-appearing   HENT:      Head: Normocephalic and atraumatic. Eyes:      General: No scleral icterus. Conjunctiva/sclera: Conjunctivae normal.   Neck:      Trachea: No tracheal deviation. Cardiovascular:      Rate and Rhythm: Normal rate and regular rhythm. Heart sounds: Normal heart sounds. No murmur heard. Pulmonary:      Effort: Pulmonary effort is normal. No respiratory distress. Breath sounds: Normal breath sounds. Chest:      Chest wall: No mass. Breasts:         Right: Normal. No mass or tenderness. Left: Absent. Abdominal:      General: Bowel sounds are normal. There is no distension. Palpations: Abdomen is soft. Tenderness: There is no abdominal tenderness. Musculoskeletal:         General: Deformity (Arthritic changes generalized) present. No tenderness. Cervical back: Normal range of motion and neck supple. Skin:     General: Skin is warm and dry. Coloration: Skin is pale. Findings: No rash.    Neurological:      Mental Status: She is alert and oriented to person, place, and time.      Coordination: Coordination normal.      Gait: Gait abnormal (Uses straight cane). Psychiatric:         Behavior: Behavior normal.         Thought Content: Thought content normal.          CBC reviewed by me  Lab Results   Component Value Date    WBC 10.78 (H) 09/07/2021    HGB 7.8 (L) 09/07/2021    HCT 26.6 (LL) 09/07/2021    MCV 85.8 09/07/2021     09/07/2021       VISIT DIAGNOSES  1. Microcytic hypochromic anemia        ASSESSMENT/PLAN:    1. Severe microcytic hypochromic anemia-new finding          She is noted to be pale today. Her hemoglobin is dropped to 7.8 with MCV down to 85.8. She denies any bleeding whatsoever. I had the CBC repeated revealing a Hgb 7.5 with MCV 89.8. She lives in [place] with her daughter. She declines to go to the hospital for admission. She is on Eliquis 5 twice daily. She is on omeprazole 40 daily    I discussed that if she gets weaker or start seeing bleeding that she needs to go to the emergency room locally. Her daughter needs to monitor her very closely. New severe microcytic anemia. PLAN  Serology will be checked that will include iron panel with ferritin. If iron panel is low we we will start iron replacement therapy orally since she lives in [address] [place], [place] [address]ona was printed out with our fax number on it  She is taking omeprazole 40 daily-I told her to take it twice a day      2. Resected stage IIa left breast carcinoma 2012 - Stable            CMP on [facility] 5/5/2021 revealed creatinine 1.1 with GFR 47 which is stable for her. LFTs WNL    Right mammogram 9/7/2021-today was BI-RADS 1.      2. Senile osteoporosis - stable    Bone density performed on 4/18/2018 revealed osteoporosis. Bone density 7/27/2020 at [facility] revealed osteopenia-improvement with Prolia. PLAN  Hold Prolia until we can get anemia stabilized  Reassess next month        · Colon cancer screening.   Screening colonoscopy was performed most recently on 10/28/2019 by Dr. Aimee Hamilton. Possible polypoid tissue was excised, pathology revealed only normal colonic mucosa without any polyp changes. · Cervical cancer screening. Prior ROSETTA        Immunization History   Administered Date(s) Administered    COVID-19, Moderna, PF, 100mcg/0.5mL 02/05/2021, 03/08/2021         Orders Placed This Encounter   Procedures    Comprehensive Metabolic Panel    Iron and TIBC    Ferritin    Vitamin B12    Folate    Electrophoresis Protein, Serum with Reflex to Immunofixation    [place]/Lambda Free Lt Chains, Serum Quant    Haptoglobin    Lactate Dehydrogenase    Reticulocytes    Blood Occult Stool Screen #3    CBC Auto Differential        Return in about 1 month (around 10/7/2021) for With Kaur Ledesma.      Ethan Mendez PA-C  2:34 PM  9/7/2021

## 2021-09-08 DIAGNOSIS — I10 ESSENTIAL HYPERTENSION: ICD-10-CM

## 2021-09-08 DIAGNOSIS — I48.19 PERSISTENT ATRIAL FIBRILLATION (HCC): ICD-10-CM

## 2021-09-08 RX ORDER — METOPROLOL SUCCINATE 25 MG/1
25 TABLET, EXTENDED RELEASE ORAL 2 TIMES DAILY
Qty: 60 TABLET | Refills: 5 | Status: SHIPPED | OUTPATIENT
Start: 2021-09-08 | End: 2022-04-04

## 2021-09-09 ENCOUNTER — TELEPHONE (OUTPATIENT)
Dept: INFUSION THERAPY | Age: 86
End: 2021-09-09

## 2021-09-09 DIAGNOSIS — D50.9 IRON DEFICIENCY ANEMIA, UNSPECIFIED IRON DEFICIENCY ANEMIA TYPE: Primary | ICD-10-CM

## 2021-09-09 RX ORDER — LANOLIN ALCOHOL/MO/W.PET/CERES
325 CREAM (GRAM) TOPICAL
Qty: 90 TABLET | Refills: 5 | Status: SHIPPED | OUTPATIENT
Start: 2021-09-09

## 2021-09-09 NOTE — TELEPHONE ENCOUNTER
Contacted patient per Shira Curry PA-C and informed her of the need to begin vitamin B12 1000 mcg PO daily OTC and ferrous sulfate TID. Patient verbalized understanding. Rx sent.

## 2021-09-16 ENCOUNTER — TELEPHONE (OUTPATIENT)
Dept: INFUSION THERAPY | Age: 86
End: 2021-09-16

## 2021-09-16 NOTE — TELEPHONE ENCOUNTER
PT's daughter Nina Dejesus called concerned because PT has been unable to collect stool samples for occult blood test.  Ninazulma Dejesus requests PT to see [facility] on Monday 09/20/21 to discuss issues because they will be in town for other appointments instead of coming in October.

## 2021-09-20 ENCOUNTER — HOSPITAL ENCOUNTER (OUTPATIENT)
Dept: INFUSION THERAPY | Age: 86
Discharge: HOME | End: 2021-09-20
Payer: MEDICARE

## 2021-09-20 ENCOUNTER — OFFICE VISIT (OUTPATIENT)
Dept: HEMATOLOGY | Age: 86
End: 2021-09-20
Payer: MEDICARE

## 2021-09-20 VITALS
BODY MASS INDEX: 26.75 KG/M2 | OXYGEN SATURATION: 96 % | DIASTOLIC BLOOD PRESSURE: 84 MMHG | WEIGHT: 132.7 LBS | HEART RATE: 100 BPM | HEIGHT: 59 IN | SYSTOLIC BLOOD PRESSURE: 118 MMHG

## 2021-09-20 DIAGNOSIS — D50.9 MICROCYTIC HYPOCHROMIC ANEMIA: Primary | ICD-10-CM

## 2021-09-20 DIAGNOSIS — D51.9 ANEMIA DUE TO VITAMIN B12 DEFICIENCY, UNSPECIFIED B12 DEFICIENCY TYPE: ICD-10-CM

## 2021-09-20 DIAGNOSIS — D50.9 MICROCYTIC HYPOCHROMIC ANEMIA: ICD-10-CM

## 2021-09-20 DIAGNOSIS — D47.2 MONOCLONAL GAMMOPATHY: ICD-10-CM

## 2021-09-20 DIAGNOSIS — D50.9 IRON DEFICIENCY ANEMIA, UNSPECIFIED IRON DEFICIENCY ANEMIA TYPE: ICD-10-CM

## 2021-09-20 LAB
BASOPHILS ABSOLUTE: 0.03 K/UL (ref 0.01–0.08)
BASOPHILS RELATIVE PERCENT: 0.3 % (ref 0.1–1.2)
EOSINOPHILS ABSOLUTE: 0.09 K/UL (ref 0.04–0.54)
EOSINOPHILS RELATIVE PERCENT: 1 % (ref 0.7–7)
HCT VFR BLD CALC: 29.1 % (ref 34.1–44.9)
HEMOGLOBIN: 8.2 G/DL (ref 11.2–15.7)
LYMPHOCYTES ABSOLUTE: 2.63 K/UL (ref 1.18–3.74)
LYMPHOCYTES RELATIVE PERCENT: 30.2 % (ref 19.3–53.1)
MCH RBC QN AUTO: 25.4 PG (ref 25.6–32.2)
MCHC RBC AUTO-ENTMCNC: 28.2 G/DL (ref 32.3–35.5)
MCV RBC AUTO: 90.1 FL (ref 79.4–94.8)
MONOCYTES ABSOLUTE: 0.72 K/UL (ref 0.24–0.82)
MONOCYTES RELATIVE PERCENT: 8.3 % (ref 4.7–12.5)
NEUTROPHILS ABSOLUTE: 5.23 K/UL (ref 1.56–6.13)
NEUTROPHILS RELATIVE PERCENT: 60.2 % (ref 34–71.1)
PDW BLD-RTO: 18 % (ref 11.7–14.4)
PLATELET # BLD: 271 K/UL (ref 182–369)
PMV BLD AUTO: 9.4 FL (ref 7.4–10.4)
RBC # BLD: 3.23 M/UL (ref 3.93–5.22)
WBC # BLD: 8.7 K/UL (ref 3.98–10.04)

## 2021-09-20 PROCEDURE — G8427 DOCREV CUR MEDS BY ELIG CLIN: HCPCS | Performed by: PHYSICIAN ASSISTANT

## 2021-09-20 PROCEDURE — 4040F PNEUMOC VAC/ADMIN/RCVD: CPT | Performed by: PHYSICIAN ASSISTANT

## 2021-09-20 PROCEDURE — 1123F ACP DISCUSS/DSCN MKR DOCD: CPT | Performed by: PHYSICIAN ASSISTANT

## 2021-09-20 PROCEDURE — 1036F TOBACCO NON-USER: CPT | Performed by: PHYSICIAN ASSISTANT

## 2021-09-20 PROCEDURE — G8417 CALC BMI ABV UP PARAM F/U: HCPCS | Performed by: PHYSICIAN ASSISTANT

## 2021-09-20 PROCEDURE — G8399 PT W/DXA RESULTS DOCUMENT: HCPCS | Performed by: PHYSICIAN ASSISTANT

## 2021-09-20 PROCEDURE — 99211 OFF/OP EST MAY X REQ PHY/QHP: CPT

## 2021-09-20 PROCEDURE — 99214 OFFICE O/P EST MOD 30 MIN: CPT | Performed by: PHYSICIAN ASSISTANT

## 2021-09-20 PROCEDURE — 1090F PRES/ABSN URINE INCON ASSESS: CPT | Performed by: PHYSICIAN ASSISTANT

## 2021-09-20 PROCEDURE — 85025 COMPLETE CBC W/AUTO DIFF WBC: CPT

## 2021-09-20 ASSESSMENT — ENCOUNTER SYMPTOMS
EYE DISCHARGE: 0
SORE THROAT: 0
COUGH: 0
BACK PAIN: 1
BLOOD IN STOOL: 0
CONSTIPATION: 0
EYE ITCHING: 0
NAUSEA: 0
COLOR CHANGE: 0
DIARRHEA: 1
VOMITING: 0
PHOTOPHOBIA: 0
TROUBLE SWALLOWING: 0
VOICE CHANGE: 0
ABDOMINAL PAIN: 0
WHEEZING: 0
ABDOMINAL DISTENTION: 0
SHORTNESS OF BREATH: 1

## 2021-09-20 NOTE — PROGRESS NOTES
cancer ([facility] 2006    Cancer [facility])     breast    Chest pain     Diabetes mellitus ([facility]     non-insulin dependent    Diaphoresis     profuse    Family history of early CAD     Gastroesophageal reflux disease     H/O bicuspid aortic valve 5/19/2015    History of blood transfusion     History of phlebitis     Hx of blood clots     Hyperlipidemia     Hypertension     Moderate tricuspid regurgitation 01/12/2016    Osteoarthritis     St[facility] ([facility] 09/2020    Valvular heart disease         Past Surgical History:   Procedure Laterality Date    AORTIC VALVE REPLACEMENT  07/28/2006    Aortic valve replacement with 21 mm Janett-Lozano pericardial tissue valve. Gideon Gold M.D.   Alvarado Freed BIOPSY Left 2006    BREAST SURGERY  2006    left masectomy    CARDIOVERSION  01/14/2016    CATARACT REMOVAL      CHOLECYSTECTOMY      COLONOSCOPY      DIAGNOSTIC CARDIAC CATH LAB PROCEDURE  2006    left heart cath, left ventriculography and selective  coronary arteriography    EYE SURGERY      HYSTERECTOMY, TOTAL ABDOMINAL  1986    patient doesn't know if she has her ovaries    JOINT REPLACEMENT      MASTECTOMY Left 2006    TONSILLECTOMY AND ADENOIDECTOMY      TOTAL KNEE ARTHROPLASTY      bilateral total knee replacement           Current Outpatient Medications:     ferrous sulfate (FE TABS 325) 325 (65 Fe) MG EC tablet, Take 1 tablet by mouth 3 times daily (with meals), Disp: 90 tablet, Rfl: 5    metoprolol succinate (TOPROL XL) 25 MG extended release tablet, Take 1 tablet by mouth 2 times daily, Disp: 60 tablet, Rfl: 5    omeprazole (PRILOSEC) 40 MG delayed release capsule, TAKE ONE CAPSULE BY MOUTH DAILY FOR STOMACH, Disp: 90 capsule, Rfl: 3    Calcium Lactate 100 MG TABS, Take by mouth, Disp: , Rfl:     COVID-19 mRNA Virus Vaccine (MODERNA COVID-19 VACCINE IM), Inject into the muscle Both doses. , Disp: , Rfl:     PARoxetine (PAXIL) 20 MG tablet, 1 tablet by mouth at bedtime for depression, Disp: 90 tablet, Rfl: 3    gabapentin (NEURONTIN) 100 MG capsule, Take 100 mg by mouth as needed for nerve pain from diabetes, Disp: 30 capsule, Rfl: 2    losartan (COZAAR) 25 MG tablet, TAKE ONE TABLET BY MOUTH EVERY DAY, Disp: , Rfl:     Calcium Carbonate-Vitamin D (OYSTER SHELL CALCIUM/D) 500-200 MG-UNIT TABS, Take 1 tablet by mouth daily, Disp: 90 tablet, Rfl: 3    colesevelam (WELCHOL) 625 MG tablet, Take 1 tablet by mouth 2 times daily (with meals) For diarrhea from gallbladder removal, Disp: 180 tablet, Rfl: 3    Probiotic Product (PROBIOTIC PO), Take by mouth daily, Disp: , Rfl:     denosumab (PROLIA) 60 MG/ML SOLN SC injection, Inject 60 mg into the skin every 6 months, Disp: , Rfl:     PROLENSA 0.07 % SOLN, 1 drop daily , Disp: , Rfl:     bimatoprost (LUMIGAN) 0.03 % ophthalmic drops, 1 drop nightly.  , Disp: , Rfl:     acetaminophen (TYLENOL) 325 MG tablet, Take 650 mg by mouth every 6 hours as needed for Pain (Patient not taking: Reported on 9/20/2021), Disp: , Rfl:      Allergies   Allergen Reactions    Penicillins      Just does not work    Sulfa Antibiotics Rash       Social History     Tobacco Use    Smoking status: Never Smoker    Smokeless tobacco: Never Used   Vaping Use    Vaping Use: Never used   Substance Use Topics    Alcohol use: No    Drug use: No       Family History   Problem Relation Age of Onset    Arthritis Mother     Heart Disease Mother     High Blood Pressure Mother     Heart Disease Father        Subjective   REVIEW OF SYSTEMS:   Review of Systems   Constitutional: Positive for fatigue (Moderate). Negative for chills, diaphoresis, fever and unexpected weight change. HENT: Negative for mouth sores, nosebleeds, sore throat, trouble swallowing and voice change. Eyes: Negative for photophobia, discharge and itching. Respiratory: Positive for shortness of breath (With exertion). Negative for cough and wheezing. soft.      Tenderness: There is no abdominal tenderness. Musculoskeletal:         General: Deformity (Arthritic changes generalized) present. No tenderness. Cervical back: Normal range of motion and neck supple. Skin:     General: Skin is warm and dry. Coloration: Skin is pale. Findings: No rash. Neurological:      Mental Status: She is alert and oriented to person, place, and time. Coordination: Coordination normal.      Gait: Gait abnormal (Uses straight cane). Psychiatric:         Behavior: Behavior normal.         Thought Content: Thought content normal.          CBC reviewed by me  Lab Results   Component Value Date    WBC 8.70 09/20/2021    HGB 8.2 (L) 09/20/2021    HCT 29.1 (L) 09/20/2021    MCV 90.1 09/20/2021     09/20/2021       VISIT DIAGNOSES  1. Microcytic hypochromic anemia    2. Iron deficiency anemia, unspecified iron deficiency anemia type    3. Anemia due to vitamin B12 deficiency, unspecified B12 deficiency type    4. Monoclonal gammopathy        ASSESSMENT/PLAN:    1. Severe microcytic hypochromic anemia -combination of iron and B12 deficiency -new finding          She was seen in the office in routine follow-up on 9/7/2020 noted to be very pale. Her hemoglobin was 7.8 with MCV 85.8. Hemoglobin previously 4 months earlier was 12. Repeat CBC revealed that the lab was correct again revealing a hemoglobin of 7.5 with MCV 89.8. She was on Eliquis 5 mg twice daily and omeprazole 40 mg daily. She resides with her daughter in [place] who brought her up for her visit, Saundra declined hospitalization. I told her to increase her  Omeprazole 40 mg to twice daily. Serology was obtained.       Serology 9/7/2021  Serum Fe - 19  TIBC - 441  Fe sat - 4%  Ferritin - 11    B12 - 216  Folate - 7.9    LDH - 197 - WNL  Haptoglobin - 186  Retic - 1.6%    SPEP -M spike unable to be quantitated due to small quantity, immunofixation revealing IgG kappa  QI IgG 775, IgA 151, IgM 40 all WNL  Free serum light chains Kappa 53.2 with K/L ratio 2.11  CMP - crt 1.04 with GFR 49, Ca 9.9      She was started on ferrous sulfate 325 3 times daily when the results of the labs returned. She was also started on B12 1000 mcg p.o. daily. She is currently taking ferrous sulfate 325 once daily. I told her to try to increase this as she can if we can get the constipation controlled. I told her to take MiraLAX 17 g in a glass of water twice a day to see if this would help with constipation. If it does not to take Senokot. She will continue the B12 1000 mcg daily. Hemoglobin today is 8.2 which has improved slightly. She has more color. She continues to be very weak. They did go to a local lab and have her blood drawn last week however I have not received the results of that as yet. I discussed getting stool for blood as well as urinalysis. She does not want to collect stool for blood and reports that she would not have any kind of invasive work-up performed if it is positive. She again adamantly denies having any GI bleeding, hematuria. I discussed IV iron replacement, IM B12 replacement however they do not want to travel from [place] for this. They do not have a local caregiver as of yet. I reiterated if she feels much weaker becomes short of breath, sees bleeding, she should go to the emergency room locally. PLAN  Ferrous sulfate 325 twice daily and increase to 3 times daily if possible based on constipation  Continue B12 1000 mcg p.o. daily  Weekly CBC at local lab            2. Resected stage IIa left breast carcinoma 2012 - Stable            CMP on [facility] 5/5/2021 revealed creatinine 1.1 with GFR 47 which is stable for her. LFTs WNL    Right mammogram 9/7/2021- was BI-RADS 1.      2. Senile osteoporosis - stable    Bone density performed on 4/18/2018 revealed osteoporosis.       Bone density 7/27/2020 at [facility] revealed osteopenia-improvement with Prolia. PLAN  Hold Prolia until we can get anemia stabilized  Reassess next month        · Colon cancer screening. Screening colonoscopy was performed most recently on 10/28/2019 by Dr. Kristian Landrum. Possible polypoid tissue was excised, pathology revealed only normal colonic mucosa without any polyp changes. · Cervical cancer screening. Prior ROSETTA        Immunization History   Administered Date(s) Administered    COVID-19, Moderna, PF, 100mcg/0.5mL 02/05/2021, 03/08/2021         ORDERS this encounter   · Weekly CBC at local lab and have faxed to the office. Return in about 1 month (around 10/20/2021) for With [facility] and possible prolia.      Miguel Zamorano PA-C  12:06 PM  9/20/2021

## 2021-10-18 ENCOUNTER — HOSPITAL ENCOUNTER (OUTPATIENT)
Dept: INFUSION THERAPY | Age: 86
Discharge: HOME | End: 2021-10-18
Payer: MEDICARE

## 2021-10-18 DIAGNOSIS — D50.9 MICROCYTIC HYPOCHROMIC ANEMIA: ICD-10-CM

## 2021-10-20 NOTE — PROGRESS NOTES
Progress Note      Pt Name: Tashi Flair: 1935  MRN: [id number]    Date of evaluation: 10/21/2021  History Obtained From:  patient, daughter -Tonio Green, electronic medical record    CHIEF COMPLAINT:    Chief Complaint   Patient presents with    Follow-up     Microcytic hypochromic anemia     Current active problems  Resected Stage II left breast cancer   Senile osteoperosis    HISTORY OF PRESENT ILLNESS:    Jaydon Ochoa is a 80 y.o.  female with a history of resected left stage II breast carcinoma dating back to 12/27/2012. She was ER NC negative. She was HER-2 positive. She completed adjuvant chemotherapy and Herceptin for a year. She was seen in the office in routine follow-up on 9/7/2020 noted to be very pale. Her hemoglobin was 7.8 with MCV 85.8. Hemoglobin previously 4 months earlier was 12. Repeat CBC revealed that the lab was correct again revealing a hemoglobin of 7.5 with MCV 89.8. She was on Eliquis 5 mg twice daily and omeprazole 40 mg daily. She resides with her daughter, Tonio Green, in [place] who brought her up for her visit, Saundra declined hospitalization. I told her to increase her omeprazole 40 mg to twice daily. Serology was obtained. She was noted to be both iron and B12 deficient on her serology. She was started on ferrous sulfate 325 3 times daily, B12 1000 mcg dailygiven orally since she lives in [place] with her daughter. Her insurance would not pay for her prescription iron so she took over-the-counter iron but was only taking it once a day. Her hemoglobin had improved to 8.2 when she returned on 9/20/2021. She has been getting weekly CBCs at a local lab however they have not been faxed into our office. She does feel better and is getting a little bit stronger. She denies any bleeding, she does have black bowel movements from the iron. She seems to be tolerating the iron well taking it 3 times a day.     She has history of nonhemorrhagic CVA. She does have history of atrial fibrillation is on Eliquis 5 twice daily. She has not had any new neurologic issues. She does have GERD I told her to continue taking the omeprazole twice a day. Blood pressure apparently has been doing okay on her current medication. She does continue taking atorvastatin for cholesterol. She reports that she has not been taking her calcium and vitamin D.      TUMOR HISTORY: Left stage IIA (T2 N0 M0), ER/HI negative, Her2 Trudi positive breast cancer, 12/27/12  Saundra was seen in initial oncology consultation on 01/17/13, referred by Dr. Jeferson Tran with regard to a diagnosis of left breast cancer. Her history dates back to just before Thanksgiving when she noticed a lump in her left breast. A mammogram was done early on 11/20/12 that revealed abnormalities in the left breast in the left lower outer quadrant. She has undergone a left modified radical mastectomy with axillary lymph node dissection by Dr. Edson Rossi on 12/27/12 that reveals a 3.3 cm left breast mass. ER and HI are both negative. Her2 Trudi is 3+ by IHC and amplified by FISH at 6.3. All of the axillary lymph nodes were negative. Margins were clear. Metastatic workup including CT scans of the chest, abdomen and pelvis, MRI of the brain and bone scan were all negative for metastatic disease. Adjuvant chemotherapy including Herceptin was recommended. Her left ventricular ejection fraction on 2D echocardiogram on 12/28/12 was 65%. Chemotherapy including Taxotere, Cytoxan and Herceptin were delivered as outlined below. TREATMENT SUMMARY:  1. Left modified radical mastectomy 12/27/12. 2. Taxotere, Cytoxan and Herceptin chemotherapy, initiated 02/28/13. She completed Taxotere/cytoxan x 6 cycles on 6/28/2013. 3. She continued on weekly herceptin with her final 52nd dose given on 04/04/14.       HEMATOLOGY HISTORY: B12 and iron deficient anemia  She was seen in the office in routine phlebitis     Hx of blood clots     Hyperlipidemia     Hypertension     Moderate tricuspid regurgitation 01/12/2016    Osteoarthritis     Stroke ([address] 09/2020    Valvular heart disease         Past Surgical History:   Procedure Laterality Date    AORTIC VALVE REPLACEMENT  07/28/2006    Aortic valve replacement with 21 mm Janett-Lozano pericardial tissue valve. HERMAN Garzon BIOPSY Left 2006    BREAST SURGERY  2006    left masectomy    CARDIOVERSION  01/14/2016    CATARACT REMOVAL      CHOLECYSTECTOMY      COLONOSCOPY      DIAGNOSTIC CARDIAC CATH LAB PROCEDURE  2006    left heart cath, left ventriculography and selective  coronary arteriography    EYE SURGERY      HYSTERECTOMY, TOTAL ABDOMINAL  1986    patient doesn't know if she has her ovaries    JOINT REPLACEMENT      MASTECTOMY Left 2006    TONSILLECTOMY AND ADENOIDECTOMY      TOTAL KNEE ARTHROPLASTY      bilateral total knee replacement           Current Outpatient Medications:     ferrous sulfate (FE TABS 325) 325 (65 Fe) MG EC tablet, Take 1 tablet by mouth 3 times daily (with meals), Disp: 90 tablet, Rfl: 5    metoprolol succinate (TOPROL XL) 25 MG extended release tablet, Take 1 tablet by mouth 2 times daily, Disp: 60 tablet, Rfl: 5    omeprazole (PRILOSEC) 40 MG delayed release capsule, TAKE ONE CAPSULE BY MOUTH DAILY FOR STOMACH, Disp: 90 capsule, Rfl: 3    Calcium Lactate 100 MG TABS, Take by mouth, Disp: , Rfl:     COVID-19 mRNA Virus Vaccine (MODERNA COVID-19 VACCINE IM), Inject into the muscle Both doses. , Disp: , Rfl:     PARoxetine (PAXIL) 20 MG tablet, 1 tablet by mouth at bedtime for depression, Disp: 90 tablet, Rfl: 3    gabapentin (NEURONTIN) 100 MG capsule, Take 100 mg by mouth as needed for nerve pain from diabetes, Disp: 30 capsule, Rfl: 2    losartan (COZAAR) 25 MG tablet, TAKE ONE TABLET BY MOUTH EVERY DAY, Disp: , Rfl:     Calcium Carbonate-Vitamin D (OYSTER SHELL CALCIUM/D) 500-200 MG-UNIT TABS, Take 1 tablet by mouth daily, Disp: 90 tablet, Rfl: 3    colesevelam (WELCHOL) 625 MG tablet, Take 1 tablet by mouth 2 times daily (with meals) For diarrhea from gallbladder removal, Disp: 180 tablet, Rfl: 3    Probiotic Product (PROBIOTIC PO), Take by mouth daily, Disp: , Rfl:     denosumab (PROLIA) 60 MG/ML SOLN SC injection, Inject 60 mg into the skin every 6 months, Disp: , Rfl:     PROLENSA 0.07 % SOLN, 1 drop daily , Disp: , Rfl:     bimatoprost (LUMIGAN) 0.03 % ophthalmic drops, 1 drop nightly.  , Disp: , Rfl:      Allergies   Allergen Reactions    Penicillins      Just does not work    Sulfa Antibiotics Rash       Social History     Tobacco Use    Smoking status: Never Smoker    Smokeless tobacco: Never Used   Vaping Use    Vaping Use: Never used   Substance Use Topics    Alcohol use: No    Drug use: No       Family History   Problem Relation Age of Onset    Arthritis Mother     Heart Disease Mother     High Blood Pressure Mother     Heart Disease Father        Subjective   REVIEW OF SYSTEMS:   Review of Systems   Constitutional: Positive for fatigue (Improved). Negative for chills, diaphoresis, fever and unexpected weight change. HENT: Negative for mouth sores, nosebleeds, sore throat, trouble swallowing and voice change. Eyes: Positive for visual disturbance. Negative for photophobia, discharge and itching. Respiratory: Positive for shortness of breath (With exertion slightly better). Negative for cough and wheezing. Cardiovascular: Positive for palpitations (On occasion but rare). Negative for chest pain and leg swelling. Gastrointestinal: Negative for abdominal distention, abdominal pain, blood in stool (Black stools from iron), constipation, diarrhea, nausea and vomiting. Endocrine: Negative for cold intolerance, heat intolerance, polydipsia and polyuria.    Genitourinary: Negative for difficulty urinating, dysuria, hematuria (Denies) and urgency. Musculoskeletal: Positive for arthralgias and back pain. Negative for joint swelling and myalgias. Skin: Negative for color change and rash. Neurological: Positive for weakness (Left upper extremity). Negative for dizziness, tremors, seizures, syncope and light-headedness. Hematological: Negative for adenopathy. Does not bruise/bleed easily. Psychiatric/Behavioral: Negative for behavioral problems and suicidal ideas. The patient is not nervous/anxious. All other systems reviewed and are negative. Objective   /60   Pulse 99   Ht 4' 11\" (1.499 m)   Wt 128 lb (58.1 kg)   SpO2 100%   BMI 25.85 kg/m²     Wt Readings from Last 3 Encounters:   10/21/21 128 lb (58.1 kg)   09/20/21 132 lb 11.2 oz (60.2 kg)   09/07/21 132 lb (59.9 kg)         PHYSICAL EXAM:  Physical Exam  Constitutional:       Appearance: She is well-developed. Comments: Chronically ill-appearing   HENT:      Head: Normocephalic and atraumatic. Eyes:      General: No scleral icterus. Conjunctiva/sclera: Conjunctivae normal.   Neck:      Trachea: No tracheal deviation. Cardiovascular:      Rate and Rhythm: Normal rate and regular rhythm. Heart sounds: Normal heart sounds. No murmur heard. Pulmonary:      Effort: Pulmonary effort is normal. No respiratory distress. Breath sounds: Normal breath sounds. Chest:      Chest wall: No mass. Abdominal:      General: Bowel sounds are normal. There is no distension. Palpations: Abdomen is soft. Tenderness: There is no abdominal tenderness. Musculoskeletal:         General: Deformity (Arthritic changes generalized) present. No tenderness. Cervical back: Normal range of motion and neck supple. Skin:     General: Skin is warm and dry. Coloration: Skin is pale. Findings: No rash. Neurological:      Mental Status: She is alert and oriented to person, place, and time.       Coordination: Coordination normal. Gait: Gait abnormal (Rolling walker). Psychiatric:         Behavior: Behavior normal.         Thought Content: Thought content normal.          CBC reviewed by me  Lab Results   Component Value Date    WBC 9.57 10/21/2021    HGB 10.8 (L) 10/21/2021    HCT 35.6 10/21/2021    MCV 93.7 10/21/2021     10/21/2021       VISIT DIAGNOSES  1. Iron deficiency anemia, unspecified iron deficiency anemia type    2. Anemia due to vitamin B12 deficiency, unspecified B12 deficiency type        ASSESSMENT/PLAN:    1. Severe microcytic hypochromic anemia -combination of iron and B12 deficiency starting to respond to therapy        Hgb today is up to 10.8. She appears to be responding well to oral B12 and iron replacement. She looks much better, has more color, appears stronger. She reports she has more energy. PLAN  Okay to decrease iron to twice a day  Keep taking B12 1000 mcg daily  Continue omeprazole 40 twice daily  CBC at local lab in 1 month to confirm stability      2. Resected triple negative stage IIa left breast carcinoma 2012 - Stable              Right mammogram [facility]/7/2021 at [facility]  was BI-RADS 1. No evidence of recurrence progression on examination. 2. Senile osteoporosis - stable    Bone density performed on 4/18/2018 revealed osteoporosis. [facility] density 7/27/2020 at [facility] revealed osteopeniaimprovement with Prolia. CMP 9/7/2021 revealed calcium 9.9    PLAN  Prolia today  Continue calcium replacement      · Colon cancer screening. Screening colonoscopy was performed most recently on 10/28/2019 by Dr. Jacqueline Severs. Possible polypoid tissue was excised, pathology revealed only normal colonic mucosa without any polyp changes. · Cervical cancer screening. Prior ROSETTA        Immunization History   Administered Date(s) Administered    COVID-19, Moderna, PF, 100mcg/0.5mL 02/05/2021, 03/08/2021         ORDERS this encounter   · CBC at local lab in 1 month and have faxed to the office.       Return in about 3 months (around 1/21/2022) for With José Miguel Crews.      Jerrod Gonzáles PA-C  2:01 PM  10/21/2021

## 2021-10-21 ENCOUNTER — OFFICE VISIT (OUTPATIENT)
Dept: HEMATOLOGY | Age: 86
End: 2021-10-21
Payer: MEDICARE

## 2021-10-21 ENCOUNTER — HOSPITAL ENCOUNTER (OUTPATIENT)
Dept: INFUSION THERAPY | Age: 86
Discharge: HOME | End: 2021-10-21
Payer: MEDICARE

## 2021-10-21 VITALS
OXYGEN SATURATION: 100 % | BODY MASS INDEX: 25.8 KG/M2 | WEIGHT: 128 LBS | SYSTOLIC BLOOD PRESSURE: 120 MMHG | HEIGHT: 59 IN | DIASTOLIC BLOOD PRESSURE: 60 MMHG | HEART RATE: 99 BPM

## 2021-10-21 DIAGNOSIS — C50.919 MALIGNANT NEOPLASM OF FEMALE BREAST, UNSPECIFIED ESTROGEN RECEPTOR STATUS, UNSPECIFIED LATERALITY, UNSPECIFIED SITE OF BREAST (HCC): ICD-10-CM

## 2021-10-21 DIAGNOSIS — D50.9 MICROCYTIC HYPOCHROMIC ANEMIA: Primary | ICD-10-CM

## 2021-10-21 DIAGNOSIS — D50.9 IRON DEFICIENCY ANEMIA, UNSPECIFIED IRON DEFICIENCY ANEMIA TYPE: Primary | ICD-10-CM

## 2021-10-21 DIAGNOSIS — D51.9 ANEMIA DUE TO VITAMIN B12 DEFICIENCY, UNSPECIFIED B12 DEFICIENCY TYPE: ICD-10-CM

## 2021-10-21 LAB
BASOPHILS ABSOLUTE: 0.03 K/UL (ref 0.01–0.08)
BASOPHILS RELATIVE PERCENT: 0.3 % (ref 0.1–1.2)
EOSINOPHILS ABSOLUTE: 0.1 K/UL (ref 0.04–0.54)
EOSINOPHILS RELATIVE PERCENT: 1 % (ref 0.7–7)
HCT VFR BLD CALC: 35.6 % (ref 34.1–44.9)
HEMOGLOBIN: 10.8 G/DL (ref 11.2–15.7)
LYMPHOCYTES ABSOLUTE: 2.76 K/UL (ref 1.18–3.74)
LYMPHOCYTES RELATIVE PERCENT: 28.8 % (ref 19.3–53.1)
MCH RBC QN AUTO: 28.4 PG (ref 25.6–32.2)
MCHC RBC AUTO-ENTMCNC: 30.3 G/DL (ref 32.3–35.5)
MCV RBC AUTO: 93.7 FL (ref 79.4–94.8)
MONOCYTES ABSOLUTE: 0.81 K/UL (ref 0.24–0.82)
MONOCYTES RELATIVE PERCENT: 8.5 % (ref 4.7–12.5)
NEUTROPHILS ABSOLUTE: 5.87 K/UL (ref 1.56–6.13)
NEUTROPHILS RELATIVE PERCENT: 61.4 % (ref 34–71.1)
PDW BLD-RTO: 20.5 % (ref 11.7–14.4)
PLATELET # BLD: 220 K/UL (ref 182–369)
PMV BLD AUTO: 9.5 FL (ref 7.4–10.4)
RBC # BLD: 3.8 M/UL (ref 3.93–5.22)
WBC # BLD: 9.57 K/UL (ref 3.98–10.04)

## 2021-10-21 PROCEDURE — 6360000002 HC RX W HCPCS: Performed by: PHYSICIAN ASSISTANT

## 2021-10-21 PROCEDURE — 85025 COMPLETE CBC W/AUTO DIFF WBC: CPT

## 2021-10-21 PROCEDURE — 1036F TOBACCO NON-USER: CPT | Performed by: PHYSICIAN ASSISTANT

## 2021-10-21 PROCEDURE — 4040F PNEUMOC VAC/ADMIN/RCVD: CPT | Performed by: PHYSICIAN ASSISTANT

## 2021-10-21 PROCEDURE — 96372 THER/PROPH/DIAG INJ SC/IM: CPT

## 2021-10-21 PROCEDURE — 99214 OFFICE O/P EST MOD 30 MIN: CPT | Performed by: PHYSICIAN ASSISTANT

## 2021-10-21 PROCEDURE — 99211 OFF/OP EST MAY X REQ PHY/QHP: CPT

## 2021-10-21 PROCEDURE — G8427 DOCREV CUR MEDS BY ELIG CLIN: HCPCS | Performed by: PHYSICIAN ASSISTANT

## 2021-10-21 PROCEDURE — 1123F ACP DISCUSS/DSCN MKR DOCD: CPT | Performed by: PHYSICIAN ASSISTANT

## 2021-10-21 PROCEDURE — G8484 FLU IMMUNIZE NO ADMIN: HCPCS | Performed by: PHYSICIAN ASSISTANT

## 2021-10-21 PROCEDURE — G8417 CALC BMI ABV UP PARAM F/U: HCPCS | Performed by: PHYSICIAN ASSISTANT

## 2021-10-21 PROCEDURE — 1090F PRES/ABSN URINE INCON ASSESS: CPT | Performed by: PHYSICIAN ASSISTANT

## 2021-10-21 RX ADMIN — DENOSUMAB 60 MG: 60 INJECTION SUBCUTANEOUS at 14:01

## 2021-10-21 ASSESSMENT — ENCOUNTER SYMPTOMS
NAUSEA: 0
WHEEZING: 0
COLOR CHANGE: 0
CONSTIPATION: 0
SHORTNESS OF BREATH: 1
VOICE CHANGE: 0
DIARRHEA: 0
VOMITING: 0
EYE DISCHARGE: 0
BLOOD IN STOOL: 0
COUGH: 0
BACK PAIN: 1
PHOTOPHOBIA: 0
EYE ITCHING: 0
TROUBLE SWALLOWING: 0
ABDOMINAL DISTENTION: 0
SORE THROAT: 0
ABDOMINAL PAIN: 0

## 2021-11-10 ENCOUNTER — OFFICE VISIT (OUTPATIENT)
Dept: CARDIOLOGY CLINIC | Age: 86
End: 2021-11-10
Payer: MEDICARE

## 2021-11-10 ENCOUNTER — OFFICE VISIT (OUTPATIENT)
Dept: PRIMARY CARE CLINIC | Age: 86
End: 2021-11-10
Payer: MEDICARE

## 2021-11-10 VITALS
DIASTOLIC BLOOD PRESSURE: 68 MMHG | HEART RATE: 91 BPM | BODY MASS INDEX: 25.6 KG/M2 | SYSTOLIC BLOOD PRESSURE: 116 MMHG | HEIGHT: 59 IN | WEIGHT: 127 LBS

## 2021-11-10 VITALS
TEMPERATURE: 97.7 F | SYSTOLIC BLOOD PRESSURE: 126 MMHG | HEART RATE: 92 BPM | HEIGHT: 59 IN | OXYGEN SATURATION: 100 % | RESPIRATION RATE: 18 BRPM | DIASTOLIC BLOOD PRESSURE: 75 MMHG | BODY MASS INDEX: 25.96 KG/M2 | WEIGHT: 128.8 LBS

## 2021-11-10 DIAGNOSIS — N18.31 STAGE 3A CHRONIC KIDNEY DISEASE (HCC): ICD-10-CM

## 2021-11-10 DIAGNOSIS — Z23 NEED FOR INFLUENZA VACCINATION: ICD-10-CM

## 2021-11-10 DIAGNOSIS — I10 ESSENTIAL HYPERTENSION: ICD-10-CM

## 2021-11-10 DIAGNOSIS — I48.19 PERSISTENT ATRIAL FIBRILLATION (HCC): Primary | ICD-10-CM

## 2021-11-10 DIAGNOSIS — E11.9 TYPE 2 DIABETES MELLITUS WITHOUT COMPLICATION, WITHOUT LONG-TERM CURRENT USE OF INSULIN (HCC): Primary | ICD-10-CM

## 2021-11-10 LAB
ALBUMIN SERPL-MCNC: 4.1 G/DL (ref 3.5–5.2)
ALP BLD-CCNC: 57 U/L (ref 35–104)
ALT SERPL-CCNC: <5 U/L (ref 5–33)
ANION GAP SERPL CALCULATED.3IONS-SCNC: 14 MMOL/L (ref 7–19)
AST SERPL-CCNC: 11 U/L (ref 5–32)
BILIRUB SERPL-MCNC: 0.3 MG/DL (ref 0.2–1.2)
BUN BLDV-MCNC: 12 MG/DL (ref 8–23)
CALCIUM SERPL-MCNC: 9.8 MG/DL (ref 8.8–10.2)
CHLORIDE BLD-SCNC: 105 MMOL/L (ref 98–111)
CO2: 24 MMOL/L (ref 22–29)
CREAT SERPL-MCNC: 1 MG/DL (ref 0.5–0.9)
GFR AFRICAN AMERICAN: >59
GFR NON-AFRICAN AMERICAN: 53
GLUCOSE BLD-MCNC: 111 MG/DL (ref 74–109)
HBA1C MFR BLD: 5.2 % (ref 4–6)
POTASSIUM SERPL-SCNC: 3.8 MMOL/L (ref 3.5–5)
SODIUM BLD-SCNC: 143 MMOL/L (ref 136–145)
TOTAL PROTEIN: 7.1 G/DL (ref 6.6–8.7)

## 2021-11-10 PROCEDURE — G8427 DOCREV CUR MEDS BY ELIG CLIN: HCPCS | Performed by: FAMILY MEDICINE

## 2021-11-10 PROCEDURE — 99213 OFFICE O/P EST LOW 20 MIN: CPT | Performed by: FAMILY MEDICINE

## 2021-11-10 PROCEDURE — 99214 OFFICE O/P EST MOD 30 MIN: CPT | Performed by: INTERNAL MEDICINE

## 2021-11-10 PROCEDURE — 1036F TOBACCO NON-USER: CPT | Performed by: FAMILY MEDICINE

## 2021-11-10 PROCEDURE — 93000 ELECTROCARDIOGRAM COMPLETE: CPT | Performed by: INTERNAL MEDICINE

## 2021-11-10 PROCEDURE — G8427 DOCREV CUR MEDS BY ELIG CLIN: HCPCS | Performed by: INTERNAL MEDICINE

## 2021-11-10 PROCEDURE — 90694 VACC AIIV4 NO PRSRV 0.5ML IM: CPT | Performed by: FAMILY MEDICINE

## 2021-11-10 PROCEDURE — G8484 FLU IMMUNIZE NO ADMIN: HCPCS | Performed by: FAMILY MEDICINE

## 2021-11-10 PROCEDURE — G8417 CALC BMI ABV UP PARAM F/U: HCPCS | Performed by: FAMILY MEDICINE

## 2021-11-10 PROCEDURE — 1123F ACP DISCUSS/DSCN MKR DOCD: CPT | Performed by: FAMILY MEDICINE

## 2021-11-10 PROCEDURE — G0008 ADMIN INFLUENZA VIRUS VAC: HCPCS | Performed by: FAMILY MEDICINE

## 2021-11-10 PROCEDURE — 1123F ACP DISCUSS/DSCN MKR DOCD: CPT | Performed by: INTERNAL MEDICINE

## 2021-11-10 PROCEDURE — 1036F TOBACCO NON-USER: CPT | Performed by: INTERNAL MEDICINE

## 2021-11-10 PROCEDURE — 1090F PRES/ABSN URINE INCON ASSESS: CPT | Performed by: FAMILY MEDICINE

## 2021-11-10 PROCEDURE — G8417 CALC BMI ABV UP PARAM F/U: HCPCS | Performed by: INTERNAL MEDICINE

## 2021-11-10 PROCEDURE — 4040F PNEUMOC VAC/ADMIN/RCVD: CPT | Performed by: INTERNAL MEDICINE

## 2021-11-10 PROCEDURE — 1090F PRES/ABSN URINE INCON ASSESS: CPT | Performed by: INTERNAL MEDICINE

## 2021-11-10 PROCEDURE — G8484 FLU IMMUNIZE NO ADMIN: HCPCS | Performed by: INTERNAL MEDICINE

## 2021-11-10 PROCEDURE — 4040F PNEUMOC VAC/ADMIN/RCVD: CPT | Performed by: FAMILY MEDICINE

## 2021-11-10 RX ORDER — ACETAMINOPHEN 160 MG
TABLET,DISINTEGRATING ORAL
COMMUNITY

## 2021-11-10 RX ORDER — ATORVASTATIN CALCIUM 80 MG/1
80 TABLET, FILM COATED ORAL NIGHTLY
COMMUNITY
Start: 2021-09-08 | End: 2021-12-08

## 2021-11-10 ASSESSMENT — ENCOUNTER SYMPTOMS
GASTROINTESTINAL NEGATIVE: 1
RESPIRATORY NEGATIVE: 1

## 2021-11-10 NOTE — PROGRESS NOTES
HISTORY  80-year-old lady with a history of dyslipidemia, diabetes, hypertension, prior [place] filter, chronic atrial fib, and prior aortic valve replacement. Valve surgery performed in July 2006 with a bioprosthesis utilized. Echocardiographic reassessment last obtained in April 2019 with preserved systolic function, normal aortic bioprosthesis function, and moderate mitral regurgitation. She has been maintained on chronic anticoagulation because of previous DVT/[place] filter as well as the above-noted chronic atrial fibrillation. In September 2020 she experienced a reversible neurologic defect with thorough evaluation at [facility] including a ERLIN finding no source. Her antihypertensive therapy has been manipulated because of some low pressures with accompanying orthostasis. Her lipids were last checked in May with an LDL of 53, HDL 66, triglycerides of 77. On return today she relates no worsening of her dyspnea on exertion or chest discomfort. Does experience significant discoloration of her feet if she sits for very long [has a Laur[place] filter in place]. She in addition has been trouble with vertigo and considers herself a fall risk. She has been vaccinated for COVID-19. PHYSICAL EXAM  On exam she carries 127 pounds on a 4 foot 11 inch frame [continues to lose weight]. Pressure is 116/68 with an irregular pulse of 91. Alopecia. Unable to mount the exam table she is examined in a chair. No elevation of central venous pressure at 90 degrees. Carotid upstrokes normal without audible bruit. Mild kyphosis with no abnormal breath sounds. First heart sound is variable without significant murmur. Trace lower extremity edema. EKG reveals atrial fibrillation with a minor intraventricular conduction delay, pretransitional Q and poor R wave progression precluding exclusion of a previous anterior infarct [no change from 3/19]. ASSESSMENT/PLAN:   1.   Chronic atrial fibrillation/anticoagulation -at age 80 with a creatinine of 1 and a weight of 127 pounds, particularly with a fall risk, will reduce her Eliquis dose from 5 mg twice daily to 2.5 twice daily. Continue metoprolol. Discussed the basis for this change with patient and her daughter. 2.  Hypertension -adequate control. Continue metoprolol. 3.  Valvular heart disease -clinical status and physical exam suggest no significant aortic valvular dysfunction or symptomatic mitral regurgitation. Will likely repeat an echo in 1 year.   4.  Pandemic response -appropriate/vaccinated

## 2021-11-10 NOTE — PROGRESS NOTES
SUBJECTIVE:    Whit Hutton is 80 y. o.female who comes in complaining of 6 Month Follow-Up (diabetes)   . HPI: Mrs. Juanita Martínez comes in today for recheck of her diabetes. She lives with her daughter in [place]. She does need blood work. Since I have seen her she has developed severe anemia and is being followed by Dr. Kusum Cadet. She was on iron 3 times a day which turned her stools dark but he cut her back to 2 a day after her most recent lab values. She does not check her sugars regularly. She said no episodes of hypoglycemia. Since she lost weight she is really not even on any medicines for diabetes. She has had no further TIAs. Her feet do turn blue when she is sitting but if she elevates them they do better. She has peripheral edema but is not getting worse. She is using a walker. She is no longer taking gabapentin for any numbness or tingling in her feet or legs. Allergies   Allergen Reactions    Penicillins      Just does not work    Sulfa Antibiotics Rash       Social History     Socioeconomic History    Marital status:      Spouse name: None    Number of children: 3    Years of education: None    Highest education level: None   Occupational History    Occupation: retired   Tobacco Use    Smoking status: Never Smoker    Smokeless tobacco: Never Used   Vaping Use    Vaping Use: Never used   Substance and Sexual Activity    Alcohol use: No    Drug use: No    Sexual activity: Not Currently     Partners: Male     Birth control/protection: Post-menopausal     Comment:    Other Topics Concern    None   Social History Narrative    3/24:    Pt lives with her daughter in [place]. Has a small dog. Another adult child - a son - now living in same home as pt and he is supportive, assistive to patient. Continues to see [facility] providers at this time. Patient does not drive - dtr provides transportation.      Social Determinants of Health     Financial Resource Strain:     Difficulty of Paying Living Expenses: Not on file   Food Insecurity:     Worried About Running Out of Food in the Last Year: Not on file    Connor of Food in the Last Year: Not on file   Transportation Needs:     Lack of Transportation (Medical): Not on file    Lack of Transportation (Non-Medical): Not on file   Physical Activity:     Days of Exercise per Week: Not on file    Minutes of Exercise per Session: Not on file   Stress:     Feeling of Stress : Not on file   Social Connections:     Frequency of Communication with Friends and Family: Not on file    Frequency of Social Gatherings with Friends and Family: Not on file    Attends Confucianist Services: Not on file    Active Member of [address] [place] [address] [employer] or Organizations: Not on file    Attends Club or Organization Meetings: Not on file    Marital Status: Not on file   Intimate Partner Violence:     Fear of Current or Ex-Partner: Not on file    Emotionally Abused: Not on file    Physically Abused: Not on file    Sexually Abused: Not on file   Housing Stability:     Unable to Pay for Housing in the Last Year: Not on file    Number of [place] in the Last Year: Not on file    Unstable Housing in the Last Year: Not on file       Review of Systems   Constitutional: Positive for activity change. Negative for unexpected weight change. Eyes: Negative for visual disturbance. Respiratory: Negative. Cardiovascular: Positive for palpitations. Gastrointestinal: Negative. Endocrine: Negative for polydipsia, polyphagia and polyuria. Genitourinary: Negative for dysuria. Musculoskeletal: Positive for arthralgias. Neurological: Negative for light-headedness and numbness. Hematological: Bruises/bleeds easily. Psychiatric/Behavioral: Negative.           Current Outpatient Medications on File Prior to Visit   Medication Sig Dispense Refill    atorvastatin (LIPITOR) 80 MG tablet Take 80 mg by mouth nightly      Cholecalciferol (VITAMIN D3) 48 MCG (2000 UT) CAPS Take by mouth      ferrous sulfate (FE TABS 325) 325 (65 Fe) MG EC tablet Take 1 tablet by mouth 3 times daily (with meals) 90 tablet 5    metoprolol succinate (TOPROL XL) 25 MG extended release tablet Take 1 tablet by mouth 2 times daily 60 tablet 5    omeprazole (PRILOSEC) 40 MG delayed release capsule TAKE ONE CAPSULE BY MOUTH DAILY FOR STOMACH 90 capsule 3    Calcium Lactate 100 MG TABS Take by mouth      PARoxetine (PAXIL) 20 MG tablet 1 tablet by mouth at bedtime for depression 90 tablet 3    Calcium Carbonate-Vitamin D (OYSTER SHELL CALCIUM/D) 500-200 MG-UNIT TABS Take 1 tablet by mouth daily 90 tablet 3    colesevelam (WELCHOL) 625 MG tablet Take 1 tablet by mouth 2 times daily (with meals) For diarrhea from gallbladder removal 180 tablet 3    Probiotic Product (PROBIOTIC PO) Take by mouth daily      denosumab (PROLIA) 60 MG/ML SOLN SC injection Inject 60 mg into the skin every 6 months      PROLENSA 0.07 % SOLN 1 drop daily       bimatoprost (LUMIGAN) 0.03 % ophthalmic drops 1 drop nightly. No current facility-administered medications on file prior to visit. OBJECTIVE:    Wt Readings from Last 3 Encounters:   11/10/21 127 lb (57.6 kg)   11/10/21 128 lb 12.8 oz (58.4 kg)   10/21/21 128 lb (58.1 kg)       /75   Pulse 92   Temp 97.7 °F (36.5 °C)   Resp 18   Ht 4' 11\" (1.499 m)   Wt 128 lb 12.8 oz (58.4 kg)   SpO2 100%   BMI 26.01 kg/m²     Physical Exam  Vitals and nursing note reviewed. Constitutional:       General: She is not in acute distress. Appearance: Normal appearance. She is well-developed. Comments: Uses a walker   HENT:      Head: Normocephalic. Right Ear: Tympanic membrane, ear canal and external ear normal.      Left Ear: Tympanic membrane, ear canal and external ear normal.   Eyes:      Extraocular Movements: Extraocular movements intact.       Conjunctiva/sclera: Conjunctivae normal.      Pupils: Pupils are equal, round, and reactive to light. Neck:      Vascular: No carotid bruit. Cardiovascular:      Rate and Rhythm: Normal rate. Rhythm irregular. Pulses: Normal pulses. Heart sounds: Normal heart sounds. No murmur heard. Pulmonary:      Effort: Pulmonary effort is normal. No respiratory distress. Breath sounds: Normal breath sounds. Musculoskeletal:      Cervical back: Normal range of motion and neck supple. Comments: Chronic changes of osteoarthritis at hands but no increased warmth or redness. Plus edema in both lower extremities. Lymphadenopathy:      Cervical: No cervical adenopathy. Skin:     General: Skin is warm and dry. Coloration: Skin is pale. Neurological:      Mental Status: She is alert and oriented to person, place, and time. Psychiatric:         Mood and Affect: Mood normal.         Speech: Speech normal.         Behavior: Behavior normal.         Thought Content: Thought content normal.         Judgment: Judgment normal.         ASSESSMENT:    1. Type 2 diabetes mellitus without complication, without long-term current use of insulin (HCC) Uncertain Status   2. Need for influenza vaccination    3. Essential hypertension Controlled   4. Stage 3a chronic kidney disease (HCC) Uncertain Status     We will not know if her type 2 diabetes and kidney disease are well controlled until we get the results of her blood work but her essential hypertension is well controlled. PLAN:    MEDICATIONS:  No orders of the defined types were placed in this encounter. I reviewed her medications and she does have stage IIIa kidney disease. She is not on any NSAIDs or diuretics. She did not need any refills on her medications at this time. Blood pressure is well controlled on Toprol-XL 25 mg 1 daily.   ORDERS:  Orders Placed This Encounter   Procedures    INFLUENZA, QUADV, ADJUVANTED, 65 YRS =, IM, PF, PREFILL SYR, 0.5ML (FLUAD)    Comprehensive Metabolic Panel    Hemoglobin A1C

## 2021-11-14 PROBLEM — N18.31 STAGE 3A CHRONIC KIDNEY DISEASE (HCC): Status: ACTIVE | Noted: 2021-11-14

## 2022-01-27 ENCOUNTER — HOSPITAL ENCOUNTER (OUTPATIENT)
Dept: INFUSION THERAPY | Age: 87
End: 2022-01-27

## 2022-01-31 RX ORDER — COLESEVELAM 180 1/1
TABLET ORAL
Qty: 180 TABLET | Refills: 3 | Status: SHIPPED | OUTPATIENT
Start: 2022-01-31

## 2022-02-10 NOTE — PROGRESS NOTES
Progress Note      Pt Name: Laura Reyes: 1935  MRN: [id number]    Date of evaluation: 2/11/2022  History Obtained From:  patient, daughter -Isaac Lim, electronic medical record    CHIEF COMPLAINT:    Chief Complaint   Patient presents with    Follow-up     Iron deficiency anemia, unspecified iron deficiency anemia type     Current active problems  Resected Stage II left breast cancer   Senile osteoperosis    HISTORY OF PRESENT ILLNESS:    Nikkie Ramsey is a 80 y.o.  female with a history of resected left stage II breast carcinoma dating back to 12/27/2012. She was ER AR negative. She was HER-2 positive. She completed adjuvant chemotherapy and Herceptin- for a year. She was seen in the office in routine follow-up on 9/7/2020 noted to be very pale. Her hemoglobin was 7.8 with MCV 85.8. Hemoglobin previously 4 months earlier was 12. Repeat CBC revealed that the lab was correct again revealing a hemoglobin of 7.5 with MCV 89.8. She was on Eliquis 5 mg twice daily and omeprazole 40 mg daily. She resides with her daughter, Isaac Lim, in [place] who brought her up for her visit, and required hospitalizations to our local facility. She was treated with oral iron twice a day and B12 daily orally and has shown improvement in her Hgb. She has not any transfusions since her last visit. She continues to get somewhat stronger. She has history of nonhemorrhagic CVA. She does have history of atrial fibrillation is on Eliquis 5 twice daily. She has not had any new neurologic issues. Blood pressure apparently has been doing okay on her current medication. She does continue taking atorvastatin for cholesterol.   She reports that she has not been taking her calcium and vitamin D.      TUMOR HISTORY: Left stage IIA (T2 N0 M0), ER/AR negative, Her2 Trudi positive breast cancer, 12/27/12  Saundra was seen in initial oncology consultation on 01/17/13, referred by Dr. Lucina Knapp Zackary Albert with regard to a diagnosis of left breast cancer. Her history dates back to just before Thanksgiving when she noticed a lump in her left breast. A mammogram was done early on 11/20/12 that revealed abnormalities in the left breast in the left lower outer quadrant. She has undergone a left modified radical mastectomy with axillary lymph node dissection by Dr. Magi Heredia on 12/27/12 that reveals a 3.3 cm left breast mass. ER and PA are both negative. Her2 Trudi is 3+ by IHC and amplified by FISH at 6.3. All of the axillary lymph nodes were negative. Margins were clear. Metastatic workup including CT scans of the chest, abdomen and pelvis, MRI of the brain and bone scan were all negative for metastatic disease. Adjuvant chemotherapy including Herceptin was recommended. Her left ventricular ejection fraction on 2D echocardiogram on 12/28/12 was 65%. Chemotherapy including Taxotere, Cytoxan and Herceptin were delivered as outlined below. TREATMENT SUMMARY:  1. Left modified radical mastectomy 12/27/12. 2. Taxotere, Cytoxan and Herceptin chemotherapy, initiated 02/28/13. She completed Taxotere/cytoxan x 6 cycles on 6/28/2013. 3. She continued on weekly herceptin with her final 52nd dose given on 04/04/14. HEMATOLOGY HISTORY: B12 and iron deficient anemia  She was seen in the office in routine follow-up on 9/7/2020 noted to be very pale. Her hemoglobin was 7.8 with MCV 85.8. Hemoglobin previously 4 months earlier was 12. Repeat CBC revealed that the lab was correct again revealing a hemoglobin of 7.5 with MCV 89.8. She was on Eliquis 5 mg twice daily and omeprazole 40 mg daily. She resides with her daughter in [place] who brought her up for her visit, Saundra declined hospitalization. I told her to increase her  Omeprazole 40 mg to twice daily. Serology was obtained.     Serology 9/7/2021  Serum Fe - 19  TIBC - 441  Fe sat - 4%  Ferritin - 11    B12 - 216  Folate - 7.9    LDH - 197 - WNL  Haptoglobin - 186  Retic - 1.6%    SPEP -M spike unable to be quantitated due to small quantity, immunofixation revealing IgG kappa  QI IgG 775, IgA 151, IgM 40 all WNL  Free serum light chains Kappa 53.2 with K/L ratio 2.11  CMP - crt 1.04 with GFR 49, Ca 9.9    She was started on ferrous sulfate 325 3 times daily when the results of the labs returned. She was also started on B12 1000 mcg p.o. daily. I discussed getting stool for blood as well as urinalysis. She declined to collect stool for blood and reported that she would not have any kind of invasive work-up performed if it is positive. She again adamantly denied having any GI bleeding, hematuria. I discussed IV iron replacement, IM B12 replacement however they did not want to travel from [place] for these treatments. They do not have a local caregiver as of yet. TREATMENT SUMMARY  1. Ferrous sulfate 3 times daily  2. B12 1000 mcg p.o. daily          Past Medical History:   Diagnosis Date    A-fib [facility]) 07/28/2006    s/p surgery     Breast cancer ([facility] 2006    Cancer [facility])     breast    Chest pain     [facility]     non-insulin dependent    Diaphoresis     profuse    Family history of early CAD     Gastroesophageal reflux disease     H/O bicuspid aortic valve 5/19/2015    History of blood transfusion     History of phlebitis     Hx of blood clots     Hyperlipidemia     Hypertension     Moderate tricuspid regurgitation 01/12/2016    Osteoarthritis     Stroke [facility] 09/2020    Valvular heart disease         Past Surgical History:   Procedure Laterality Date    AORTIC VALVE REPLACEMENT  07/28/2006    Aortic valve replacement with 21 mm Janett-Lozano pericardial tissue valve.   Chalino Yoo M.D.   Yanique Buolimpia BIOPSY Left 2006    BREAST SURGERY  2006    left masectomy    CARDIOVERSION  01/14/2016    CATARACT REMOVAL      CHOLECYSTECTOMY      COLONOSCOPY      DIAGNOSTIC CARDIAC CATH LAB PROCEDURE  2006    left heart cath, left ventriculography and selective  coronary arteriography    EYE SURGERY      HYSTERECTOMY, TOTAL ABDOMINAL  1986    patient doesn't know if she has her ovaries    JOINT REPLACEMENT      MASTECTOMY Left 2006    TONSILLECTOMY AND ADENOIDECTOMY      TOTAL KNEE ARTHROPLASTY      bilateral total knee replacement           Current Outpatient Medications:     colesevelam (WELCHOL) 625 MG tablet, TAKE 1 TABLET BY MOUTH TWICE A DAY WITH FOOD FOR DIARRHEA FROM GALLBADDER REMOVAL, Disp: 180 tablet, Rfl: 3    Cholecalciferol (VITAMIN D3) 50 MCG (2000 UT) CAPS, Take by mouth, Disp: , Rfl:     apixaban (ELIQUIS) 2.5 MG TABS tablet, Take 1 tablet by mouth 2 times daily, Disp: 60 tablet, Rfl: 11    ferrous sulfate (FE TABS 325) 325 (65 Fe) MG EC tablet, Take 1 tablet by mouth 3 times daily (with meals), Disp: 90 tablet, Rfl: 5    metoprolol succinate (TOPROL XL) 25 MG extended release tablet, Take 1 tablet by mouth 2 times daily, Disp: 60 tablet, Rfl: 5    omeprazole (PRILOSEC) 40 MG delayed release capsule, TAKE ONE CAPSULE BY MOUTH DAILY FOR STOMACH, Disp: 90 capsule, Rfl: 3    Calcium Lactate 100 MG TABS, Take by mouth, Disp: , Rfl:     PARoxetine (PAXIL) 20 MG tablet, 1 tablet by mouth at bedtime for depression, Disp: 90 tablet, Rfl: 3    Probiotic Product (PROBIOTIC PO), Take by mouth daily, Disp: , Rfl:     denosumab (PROLIA) 60 MG/ML SOLN SC injection, Inject 60 mg into the skin every 6 months, Disp: , Rfl:     PROLENSA 0.07 % SOLN, 1 drop daily , Disp: , Rfl:     bimatoprost (LUMIGAN) 0.03 % ophthalmic drops, 1 drop nightly.  , Disp: , Rfl:     Calcium Carbonate-Vitamin D (OYSTER SHELL CALCIUM/D) 500-200 MG-UNIT TABS, Take 1 tablet by mouth daily, Disp: 90 tablet, Rfl: 3     Allergies   Allergen Reactions    Penicillins      Just does not work    Sulfa Antibiotics Rash       Social History     Tobacco Use    Smoking status: Never Smoker  Smokeless tobacco: Never Used   Vaping Use    Vaping Use: Never used   Substance Use Topics    Alcohol use: No    Drug use: No       Family History   Problem Relation Age of Onset    Arthritis Mother     Heart Disease Mother     High Blood Pressure Mother     Heart Disease Father        Subjective   REVIEW OF SYSTEMS:   Review of Systems   Constitutional: Negative for chills, diaphoresis, fatigue (Improved), fever and unexpected weight change. HENT: Negative for mouth sores, nosebleeds, sore throat, trouble swallowing and voice change. Eyes: Positive for visual disturbance. Negative for photophobia, discharge and itching. Respiratory: Negative for cough, shortness of breath and wheezing. Cardiovascular: Positive for palpitations (On occasion but rare). Negative for chest pain and leg swelling. Gastrointestinal: Negative for abdominal distention, abdominal pain, blood in stool, constipation, diarrhea, nausea and vomiting. Endocrine: Negative for cold intolerance, heat intolerance, polydipsia and polyuria. Genitourinary: Negative for difficulty urinating, dysuria, hematuria (Denies) and urgency. Musculoskeletal: Positive for arthralgias and back pain. Negative for joint swelling and myalgias. Skin: Negative for color change and rash. Neurological: Positive for weakness (Left upper extremity). Negative for dizziness, tremors, seizures, syncope and light-headedness. Hematological: Negative for adenopathy. Does not bruise/bleed easily. Psychiatric/Behavioral: Negative for behavioral problems and suicidal ideas. The patient is not nervous/anxious. All other systems reviewed and are negative.       Objective   BP (!) 142/76   Pulse 78   Wt 118 lb (53.5 kg)   SpO2 99%   BMI 23.83 kg/m²     Wt Readings from Last 3 Encounters:   02/11/22 118 lb (53.5 kg)   11/10/21 127 lb (57.6 kg)   11/10/21 128 lb 12.8 oz (58.4 kg)         PHYSICAL EXAM:  Physical Exam  Constitutional: Appearance: She is well-developed. Comments: Chronically ill-appearing   HENT:      Head: Normocephalic and atraumatic. Eyes:      General: No scleral icterus. Conjunctiva/sclera: Conjunctivae normal.   Neck:      Trachea: No tracheal deviation. Cardiovascular:      Rate and Rhythm: Normal rate and regular rhythm. Heart sounds: Normal heart sounds. No murmur heard. Pulmonary:      Effort: Pulmonary effort is normal. No respiratory distress. Breath sounds: Normal breath sounds. Chest:      Chest wall: No mass. Abdominal:      General: Bowel sounds are normal. There is no distension. Palpations: Abdomen is soft. Tenderness: There is no abdominal tenderness. Musculoskeletal:         General: Deformity (Arthritic changes generalized) present. No tenderness. Cervical back: Normal range of motion and neck supple. Skin:     General: Skin is warm and dry. Coloration: Skin is pale. Findings: No rash. Neurological:      Mental Status: She is alert and oriented to person, place, and time. Coordination: Coordination normal.      Gait: Gait abnormal (Rolling walker). Psychiatric:         Behavior: Behavior normal.         Thought Content: Thought content normal.          CBC reviewed by me  Lab Results   Component Value Date    WBC 8.25 02/11/2022    HGB 11.2 02/11/2022    HCT 34.5 02/11/2022    MCV 94.0 02/11/2022     02/11/2022       VISIT DIAGNOSES  1. Iron deficiency anemia, unspecified iron deficiency anemia type    2. Anemia due to vitamin B12 deficiency, unspecified B12 deficiency type    3. Monoclonal gammopathy        ASSESSMENT/PLAN:    1. Severe microcytic hypochromic anemia -combination of iron and B12 deficiency -starting to respond to therapy        Hgb is up to 11.2. PLAN  Okay to decrease iron to once a day  Keep taking B12 1000 mcg daily  Okay to change omeprazole to 40 daily  Repeat iron panel today with ferritin      2.   IgG kappa monoclonal gammopathy    Serology 9/7/2021  SPEP -M spike unable to be quantitated due to small quantity, immunofixation revealing IgG kappa  QI IgG 775, IgA 151, IgM 40 all WNL  Free serum light chains Kappa 53.2 with K/L ratio 2.11  CMP - crt 1.04 with GFR 49, Ca 9.9    I am repeating her protein serology today. 3.  Resected triple negative stage IIa left breast carcinoma 2012 - Stable              Right mammogram 9/7/2021 at [facility] - was BI-RADS 1. No evidence of recurrence progression on examination. 2. Senile osteoporosis - stable    Bone density performed on 4/18/2018 revealed osteoporosis. Bone density 7/27/2020 at [facility] revealed osteopenia-improvement with Prolia. CMP on 11/10/2021 revealed calcium 9.8. She did receive her Prolia on 10/21/2021. PLAN  Prolia will be due next visit  Continue calcium replacement      · Colon cancer screening. Screening colonoscopy was performed most recently on 10/28/2019 by Dr. Julia Yin. Possible polypoid tissue was excised, pathology revealed only normal colonic mucosa without any polyp changes. · Cervical cancer screening. Prior ROSETTA        Immunization History   Administered Date(s) Administered    COVID-19, Moderna, PF, 100mcg/0.5mL 02/05/2021, 03/08/2021       Orders Placed This Encounter   Procedures    Ferritin    Iron and TIBC    Murphys/Lambda Free Lt Chains, Serum Quant    Electrophoresis Protein, Serum with Reflex to Immunofixation    Comprehensive Metabolic Panel         Return in about 3 months (around 5/11/2022) for With Jame and prolia.      Conception ETHAN Lopez  12:42 PM  2/11/2022

## 2022-02-11 ENCOUNTER — HOSPITAL ENCOUNTER (OUTPATIENT)
Dept: INFUSION THERAPY | Age: 87
Discharge: HOME | End: 2022-02-11
Payer: MEDICARE

## 2022-02-11 ENCOUNTER — OFFICE VISIT (OUTPATIENT)
Dept: HEMATOLOGY | Age: 87
End: 2022-02-11
Payer: MEDICARE

## 2022-02-11 VITALS
HEART RATE: 78 BPM | SYSTOLIC BLOOD PRESSURE: 142 MMHG | BODY MASS INDEX: 23.83 KG/M2 | DIASTOLIC BLOOD PRESSURE: 76 MMHG | OXYGEN SATURATION: 99 % | WEIGHT: 118 LBS

## 2022-02-11 DIAGNOSIS — C50.919 MALIGNANT NEOPLASM OF FEMALE BREAST, UNSPECIFIED ESTROGEN RECEPTOR STATUS, UNSPECIFIED LATERALITY, UNSPECIFIED SITE OF BREAST (HCC): ICD-10-CM

## 2022-02-11 DIAGNOSIS — D47.2 MONOCLONAL GAMMOPATHY: ICD-10-CM

## 2022-02-11 DIAGNOSIS — D51.9 ANEMIA DUE TO VITAMIN B12 DEFICIENCY, UNSPECIFIED B12 DEFICIENCY TYPE: ICD-10-CM

## 2022-02-11 DIAGNOSIS — D50.9 IRON DEFICIENCY ANEMIA, UNSPECIFIED IRON DEFICIENCY ANEMIA TYPE: Primary | ICD-10-CM

## 2022-02-11 DIAGNOSIS — D50.9 IRON DEFICIENCY ANEMIA, UNSPECIFIED IRON DEFICIENCY ANEMIA TYPE: ICD-10-CM

## 2022-02-11 DIAGNOSIS — D50.9 MICROCYTIC HYPOCHROMIC ANEMIA: ICD-10-CM

## 2022-02-11 LAB
ALBUMIN SERPL-MCNC: 3.5 G/DL (ref 3.5–5.2)
ALP BLD-CCNC: 44 U/L (ref 35–104)
ALT SERPL-CCNC: 8 U/L (ref 9–52)
ANION GAP SERPL CALCULATED.3IONS-SCNC: 8 MMOL/L (ref 7–19)
AST SERPL-CCNC: 22 U/L (ref 14–36)
BASOPHILS ABSOLUTE: 0.03 K/UL (ref 0.01–0.08)
BASOPHILS RELATIVE PERCENT: 0.4 % (ref 0.1–1.2)
BILIRUB SERPL-MCNC: 0.5 MG/DL (ref 0.2–1.3)
BUN BLDV-MCNC: 16 MG/DL (ref 7–17)
CALCIUM SERPL-MCNC: 9.1 MG/DL (ref 8.4–10.2)
CHLORIDE BLD-SCNC: 105 MMOL/L (ref 98–111)
CO2: 28 MMOL/L (ref 22–29)
CREAT SERPL-MCNC: 0.9 MG/DL (ref 0.5–1)
EOSINOPHILS ABSOLUTE: 0.19 K/UL (ref 0.04–0.54)
EOSINOPHILS RELATIVE PERCENT: 2.3 % (ref 0.7–7)
FERRITIN: 41.8 NG/ML (ref 13–150)
GFR NON-AFRICAN AMERICAN: 59
GLOBULIN: 2.8 G/DL
GLUCOSE BLD-MCNC: 106 MG/DL (ref 74–106)
HCT VFR BLD CALC: 34.5 % (ref 34.1–44.9)
HEMOGLOBIN: 11.2 G/DL (ref 11.2–15.7)
IRON % SATURATION: 33 % (ref 14–50)
IRON: 105 UG/DL (ref 37–145)
LYMPHOCYTES ABSOLUTE: 2.11 K/UL (ref 1.18–3.74)
LYMPHOCYTES RELATIVE PERCENT: 25.6 % (ref 19.3–53.1)
MCH RBC QN AUTO: 30.5 PG (ref 25.6–32.2)
MCHC RBC AUTO-ENTMCNC: 32.5 G/DL (ref 32.3–35.5)
MCV RBC AUTO: 94 FL (ref 79.4–94.8)
MONOCYTES ABSOLUTE: 0.58 K/UL (ref 0.24–0.82)
MONOCYTES RELATIVE PERCENT: 7 % (ref 4.7–12.5)
NEUTROPHILS ABSOLUTE: 5.34 K/UL (ref 1.56–6.13)
NEUTROPHILS RELATIVE PERCENT: 64.7 % (ref 34–71.1)
PDW BLD-RTO: 13.9 % (ref 11.7–14.4)
PLATELET # BLD: 191 K/UL (ref 182–369)
PMV BLD AUTO: 8.7 FL (ref 7.4–10.4)
POTASSIUM SERPL-SCNC: 4.2 MMOL/L (ref 3.5–5.1)
RBC # BLD: 3.67 M/UL (ref 3.93–5.22)
SODIUM BLD-SCNC: 141 MMOL/L (ref 137–145)
TOTAL IRON BINDING CAPACITY: 323 UG/DL (ref 250–400)
TOTAL PROTEIN: 6.3 G/DL (ref 6.3–8.2)
WBC # BLD: 8.25 K/UL (ref 3.98–10.04)

## 2022-02-11 PROCEDURE — 85025 COMPLETE CBC W/AUTO DIFF WBC: CPT

## 2022-02-11 PROCEDURE — 99211 OFF/OP EST MAY X REQ PHY/QHP: CPT

## 2022-02-11 PROCEDURE — 36415 COLL VENOUS BLD VENIPUNCTURE: CPT

## 2022-02-11 PROCEDURE — 99214 OFFICE O/P EST MOD 30 MIN: CPT | Performed by: PHYSICIAN ASSISTANT

## 2022-02-11 PROCEDURE — 80053 COMPREHEN METABOLIC PANEL: CPT

## 2022-02-11 ASSESSMENT — ENCOUNTER SYMPTOMS
DIARRHEA: 0
EYE DISCHARGE: 0
COUGH: 0
SHORTNESS OF BREATH: 0
NAUSEA: 0
CONSTIPATION: 0
BACK PAIN: 1
VOMITING: 0
PHOTOPHOBIA: 0
SORE THROAT: 0
WHEEZING: 0
VOICE CHANGE: 0
BLOOD IN STOOL: 0
ABDOMINAL PAIN: 0
EYE ITCHING: 0
COLOR CHANGE: 0
TROUBLE SWALLOWING: 0
ABDOMINAL DISTENTION: 0

## 2022-04-03 DIAGNOSIS — I10 ESSENTIAL HYPERTENSION: ICD-10-CM

## 2022-04-03 DIAGNOSIS — I48.19 PERSISTENT ATRIAL FIBRILLATION (HCC): ICD-10-CM

## 2022-04-04 RX ORDER — METOPROLOL SUCCINATE 25 MG/1
TABLET, EXTENDED RELEASE ORAL
Qty: 60 TABLET | Refills: 5 | Status: SHIPPED | OUTPATIENT
Start: 2022-04-04 | End: 2022-05-16 | Stop reason: SDUPTHER

## 2022-04-25 ENCOUNTER — TELEPHONE (OUTPATIENT)
Dept: CARDIOLOGY CLINIC | Age: 87
End: 2022-04-25

## 2022-04-26 RX ORDER — OMEPRAZOLE 40 MG/1
CAPSULE, DELAYED RELEASE ORAL
Qty: 90 CAPSULE | Refills: 3 | Status: SHIPPED | OUTPATIENT
Start: 2022-04-26

## 2022-05-16 ENCOUNTER — TELEPHONE (OUTPATIENT)
Dept: CARDIOLOGY CLINIC | Age: 87
End: 2022-05-16

## 2022-05-16 DIAGNOSIS — I48.19 PERSISTENT ATRIAL FIBRILLATION (HCC): ICD-10-CM

## 2022-05-16 DIAGNOSIS — I10 ESSENTIAL HYPERTENSION: ICD-10-CM

## 2022-05-16 RX ORDER — METOPROLOL SUCCINATE 25 MG/1
25 TABLET, EXTENDED RELEASE ORAL DAILY
Qty: 180 TABLET | Refills: 3 | Status: SHIPPED | OUTPATIENT
Start: 2022-05-16

## 2022-05-16 NOTE — TELEPHONE ENCOUNTER
Spoke to patient's daughter and offered OV with Dr. Magi Sheriff this week. She declined and stated she is doing well. Wishes to keep follow up as is but would like her meds refilled. Metoprolol and Eliquis sent in.

## 2022-05-16 NOTE — TELEPHONE ENCOUNTER
Daughter Alina Camp called about appt date and she was concerned that it is that far out with regard to meds. She is worried that they  May run out. Please giver her a call to discuss. Thank Sue Clifton time to call is anytime.

## 2022-05-26 RX ORDER — PAROXETINE HYDROCHLORIDE 20 MG/1
TABLET, FILM COATED ORAL
Qty: 90 TABLET | Refills: 3 | Status: SHIPPED | OUTPATIENT
Start: 2022-05-26

## 2022-05-26 NOTE — PROGRESS NOTES
Progress Note      Pt Name: Saloni Dang  YOB: 1935  MRN: [id number]    Date of evaluation: 5/27/2022  History Obtained From:  patient, daughter -Anaid Farr, electronic medical record    CHIEF COMPLAINT:    Chief Complaint   Patient presents with    Follow-up     Iron deficiency anemia, unspecified iron deficiency anemia type     Current active problems  Resected Stage II left breast cancer   Senile osteoperosis    HISTORY OF PRESENT ILLNESS:    Saloni Dang is a 80 y.o.  female with a history of resected left stage II breast carcinoma dating back to 12/27/2012. She was ER [place] negative. She was HER-2 positive. She completed adjuvant chemotherapy and Herceptin- for a year. She has not had any evidence of recurrence. She developed significant iron deficiency anemia. She had been on Eliquis 5 mg twice daily for atrial fibrillation, now taking 2.5 twice daily. She had been on oral iron replacement 3 times a day initially, now once a day. Her hemoglobin had subsequently normalized with replacement. She did not have GI work-up due to age. She resides with her daughter in [place] who brings her to her appointments here in [place]. She now has a PCP in [place] and will be working on getting a hematology oncology referral locally in [place]. Blood pressure apparently has been doing okay on her current medication. She does continue taking atorvastatin for cholesterol. She reports that she has not been taking her calcium and vitamin D.      TUMOR HISTORY: Left stage IIA (T2 N0 M0), ER/[place] negative, Her2 Trudi positive breast cancer, 12/27/12  Saundra was seen in initial oncology consultation on 01/17/13, referred by Dr. Shiar Hodgson with regard to a diagnosis of left breast cancer.  Her history dates back to just before Thanksgiving when she noticed a lump in her left breast. A mammogram was done early on 11/20/12 that revealed abnormalities in the left breast in the left lower outer quadrant. She has undergone a left modified radical mastectomy with axillary lymph node dissection by Dr. Cira Guidry on 12/27/12 that reveals a 3.3 cm left breast mass. ER and LA are both negative. Her2 Trudi is 3+ by IHC and amplified by FISH at 6.3. All of the axillary lymph nodes were negative. Margins were clear. Metastatic workup including CT scans of the chest, abdomen and pelvis, MRI of the brain and bone scan were all negative for metastatic disease. Adjuvant chemotherapy including Herceptin was recommended. Her left ventricular ejection fraction on 2D echocardiogram on 12/28/12 was 65%. Chemotherapy including Taxotere, Cytoxan and Herceptin were delivered as outlined below. TREATMENT SUMMARY:  1. Left modified radical mastectomy 12/27/12. 2. Taxotere, Cytoxan and Herceptin chemotherapy, initiated 02/28/13. She completed Taxotere/cytoxan x 6 cycles on 6/28/2013. 3. She continued on weekly herceptin with her final 52nd dose given on 04/04/14. HEMATOLOGY HISTORY: B12 and iron deficient anemia  She was seen in the office in routine follow-up on 9/7/2020 noted to be very pale. Her hemoglobin was 7.8 with MCV 85.8. Hemoglobin previously 4 months earlier was 12. Repeat CBC revealed that the lab was correct again revealing a hemoglobin of 7.5 with MCV 89.8. She was on Eliquis 5 mg twice daily and omeprazole 40 mg daily. She resides with her daughter in [place] who brought her up for her visit, Saundra declined hospitalization. I told her to increase her  Omeprazole 40 mg to twice daily. Serology was obtained.     Serology 9/7/2021  Serum Fe - 19  TIBC - 441  Fe sat - 4%  Ferritin - 11    B12 - 216  Folate - 7.9    LDH - 197 - WNL  Haptoglobin - 186  Retic - 1.6%    SPEP -M spike unable to be quantitated due to small quantity, immunofixation revealing IgG kappa  QI IgG 775, IgA 151, IgM 40 all WNL  Free serum light chains Kappa 53.2 with K/L ratio 2.11  CMP - crt 1.04 with GFR 49, Ca 9.9    She was started on ferrous sulfate 325 3 times daily when the results of the labs returned. She was also started on B12 1000 mcg p.o. daily. I discussed getting stool for blood as well as urinalysis. She declined to collect stool for blood and reported that she would not have any kind of invasive work-up performed if it is positive. She again adamantly denied having any GI bleeding, hematuria. I discussed IV iron replacement, IM B12 replacement however they did not want to travel from [place] for these treatments. They do not have a local caregiver as of yet. Serology 2/11/2022  Serum Fe - 105  TIBC - 323  Fe sat - 33%  Ferritin - 41.8      TREATMENT SUMMARY  1. Ferrous sulfate 3 times daily -tapered over time  2. B12 1000 mcg p.o. 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   Omi Elizondo M.D.   Cait Pitch Left 2006    BREAST SURGERY  2006    left masectomy    CARDIOVERSION  01/14/2016    CATARACT REMOVAL      CHOLECYSTECTOMY      COLONOSCOPY      DIAGNOSTIC CARDIAC CATH LAB PROCEDURE  2006    left heart cath, left ventriculography and selective  coronary arteriography    EYE SURGERY      HYSTERECTOMY, TOTAL ABDOMINAL  1986    patient doesn't know if she has her ovaries    JOINT REPLACEMENT      MASTECTOMY Left 2006    TONSILLECTOMY AND ADENOIDECTOMY      TOTAL KNEE ARTHROPLASTY      bilateral total knee replacement           Current Outpatient Medications:     PARoxetine (PAXIL) 20 MG tablet, TAKE 1 TABLET BY MOUTH AT BEDTIME FOR DEPRESSION, Disp: 90 tablet, Rfl: 3    apixaban (ELIQUIS) 2.5 MG TABS tablet, Take 1 tablet by mouth 2 times daily, Disp: 60 tablet, Rfl: 11    metoprolol succinate (TOPROL XL) 25 MG extended release tablet, Take 1 tablet by mouth daily, Disp: 180 tablet, Rfl: 3    omeprazole (PRILOSEC) 40 MG delayed release capsule, TAKE 1 CAPSULE BY MOUTH DAILY FOR STOMACH, Disp: 90 capsule, Rfl: 3    colesevelam (WELCHOL) 625 MG tablet, TAKE 1 TABLET BY MOUTH TWICE A DAY WITH FOOD FOR DIARRHEA FROM GALLBADDER REMOVAL, Disp: 180 tablet, Rfl: 3    Cholecalciferol (VITAMIN D3) 50 MCG (2000 UT) CAPS, Take by mouth, Disp: , Rfl:     ferrous sulfate (FE TABS 325) 325 (65 Fe) MG EC tablet, Take 1 tablet by mouth 3 times daily (with meals), Disp: 90 tablet, Rfl: 5    Calcium Lactate 100 MG TABS, Take by mouth, Disp: , Rfl:     Probiotic Product (PROBIOTIC PO), Take by mouth daily, Disp: , Rfl:     apixaban (ELIQUIS) 5 MG TABS tablet, Take 5 mg by mouth every 12 hours, Disp: , Rfl:     denosumab (PROLIA) 60 MG/ML SOLN SC injection, Inject 60 mg into the skin every 6 months, Disp: , Rfl:     PROLENSA 0.07 % SOLN, 1 drop daily , Disp: , Rfl:     bimatoprost (LUMIGAN) 0.03 % ophthalmic drops, 1 drop nightly.  , Disp: , Rfl:     Calcium Carbonate-Vitamin D (OYSTER SHELL CALCIUM/D) 500-200 MG-UNIT TABS, Take 1 tablet by mouth daily, Disp: 90 tablet, Rfl: 3     Allergies   Allergen Reactions    Penicillins      Just does not work    Sulfa Antibiotics Rash       Social History     Tobacco Use    Smoking status: Never Smoker    Smokeless tobacco: Never Used   Vaping Use    Vaping Use: Never used Substance Use Topics    Alcohol use: No    Drug use: No       Family History   Problem Relation Age of Onset    Arthritis Mother     Heart Disease Mother     High Blood Pressure Mother     Heart Disease Father        Subjective   REVIEW OF SYSTEMS:   Review of Systems   Constitutional: Positive for fatigue (Improved). Negative for chills, diaphoresis, fever and unexpected weight change. HENT: Negative for mouth sores, nosebleeds, sore throat, trouble swallowing and voice change. Eyes: Positive for visual disturbance. Negative for photophobia, discharge and itching. Respiratory: Negative for cough, shortness of breath and wheezing. Cardiovascular: Negative for chest pain, palpitations and leg swelling. Gastrointestinal: Negative for abdominal distention, abdominal pain, blood in stool, constipation, diarrhea, nausea and vomiting. Endocrine: Negative for cold intolerance, heat intolerance, polydipsia and polyuria. Genitourinary: Negative for difficulty urinating, dysuria, hematuria and urgency. Musculoskeletal: Positive for arthralgias and back pain. Negative for joint swelling and myalgias. Skin: Negative for color change and rash. Neurological: Positive for weakness (Left upper extremity). Negative for dizziness, tremors, seizures, syncope and light-headedness. Hematological: Negative for adenopathy. Does not bruise/bleed easily. Psychiatric/Behavioral: Negative for behavioral problems and suicidal ideas. The patient is not nervous/anxious. All other systems reviewed and are negative. Objective   /68   Pulse (!) 103   Wt 116 lb (52.6 kg)   SpO2 99%   BMI 23.43 kg/m²     PHYSICAL EXAM:  Physical Exam  Constitutional:       Appearance: She is well-developed. Comments: Chronically ill-appearing   HENT:      Head: Normocephalic and atraumatic. Eyes:      General: No scleral icterus.      Conjunctiva/sclera: Conjunctivae normal.   Neck:      Trachea: No tracheal deviation. Cardiovascular:      Rate and Rhythm: Normal rate and regular rhythm. Heart sounds: Normal heart sounds. No murmur heard. Pulmonary:      Effort: Pulmonary effort is normal. No respiratory distress. Breath sounds: Normal breath sounds. Chest:      Chest wall: No mass. Abdominal:      General: Bowel sounds are normal. There is no distension. Palpations: Abdomen is soft. Tenderness: There is no abdominal tenderness. Musculoskeletal:         General: Deformity (Arthritic changes generalized) present. No tenderness. Cervical back: Normal range of motion and neck supple. Skin:     General: Skin is warm and dry. Coloration: Skin is pale. Findings: No rash. Neurological:      Mental Status: She is alert and oriented to person, place, and time. Coordination: Coordination normal.      Gait: Gait abnormal (Rolling walker). Psychiatric:         Behavior: Behavior normal.         Thought Content: Thought content normal.          CBC reviewed by me  Lab Results   Component Value Date    WBC 10.11 (H) 05/27/2022    HGB 13.1 05/27/2022    HCT 43.7 05/27/2022    .0 (H) 05/27/2022     05/27/2022       VISIT DIAGNOSES  1. Iron deficiency anemia, unspecified iron deficiency anemia type    2. Anemia due to vitamin B12 deficiency, unspecified B12 deficiency type    3. Monoclonal gammopathy    4. Senile osteoporosis    5. History of breast cancer        ASSESSMENT/PLAN:    1. Severe microcytic hypochromic anemia -combination of iron and B12 deficiency     Serology 2/11/2022  Serum Fe - 105  TIBC - 323  Fe sat - 33%  Ferritin - 41.8          Hgb today is up to 13.1 . Her iron panel from 2/11/2022 had normalized. PLAN  Keep taking B12 1000 mcg daily  Continue omeprazole 40 daily  Okay to discontinue iron  Repeat iron panel today with ferritin      2.   IgG kappa monoclonal gammopathy    Serology 9/7/2021  SPEP -M spike unable to be quantitated due to small quantity, immunofixation revealing IgG kappa  QI IgG 775, IgA 151, IgM 40 all WNL  Free serum light chains Kappa 53.2 with K/L ratio 2.11  CMP - crt 1.04 with GFR 49, Ca 9.9    I am repeating her protein serology today. 3.  Resected triple negative stage IIa left breast carcinoma 2012 - Stable              Right mammogram 9/7/2021 at [facility] - was BI-RADS 1. No evidence of recurrence progression on examination. I rechecking her CA 15-3.      2. Senile osteoporosis - stable    Bone density performed on 4/18/2018 revealed osteoporosis. Bone density 7/27/2020 at [facility] revealed osteopenia-improvement with Prolia. CMP on 2/11/2022 revealed a calcium of 9.1. She will get her Prolia today. She will continue her calcium. She now has a PCP in [place]. They are going to be referring her to a local hematology oncologist.  Do's daughter wants us to go ahead and make an appointment for her next visit here in 6 months however they anticipate getting in with the local oncologist prior to that visit. They will cancel the appointment if they do. · Colon cancer screening. Screening colonoscopy was performed most recently on 10/28/2019 by Dr. Katy Verma. Possible polypoid tissue was excised, pathology revealed only normal colonic mucosa without any polyp changes. · Cervical cancer screening. Prior ROSETTA        Immunization History   Administered Date(s) Administered    COVID-19, Moderna, PF, 100mcg/0.5mL 02/05/2021, 03/08/2021       Orders Placed This Encounter   Procedures    MONOCLONAL PROTEIN AND FLC, SERUM    Comprehensive Metabolic Panel    Cancer Antigen 15-3         Return in about 6 months (around 11/27/2022) for With Jame and prolia.      Linda Monterroso PA-C  1:14 PM  5/27/2022

## 2022-05-27 ENCOUNTER — OFFICE VISIT (OUTPATIENT)
Dept: HEMATOLOGY | Age: 87
End: 2022-05-27
Payer: MEDICARE

## 2022-05-27 ENCOUNTER — HOSPITAL ENCOUNTER (OUTPATIENT)
Dept: INFUSION THERAPY | Age: 87
Discharge: HOME | End: 2022-05-27
Payer: MEDICARE

## 2022-05-27 VITALS
BODY MASS INDEX: 23.43 KG/M2 | OXYGEN SATURATION: 99 % | SYSTOLIC BLOOD PRESSURE: 134 MMHG | HEART RATE: 103 BPM | DIASTOLIC BLOOD PRESSURE: 68 MMHG | WEIGHT: 116 LBS

## 2022-05-27 DIAGNOSIS — Z85.3 HISTORY OF BREAST CANCER: ICD-10-CM

## 2022-05-27 DIAGNOSIS — D47.2 MONOCLONAL GAMMOPATHY: ICD-10-CM

## 2022-05-27 DIAGNOSIS — D51.9 ANEMIA DUE TO VITAMIN B12 DEFICIENCY, UNSPECIFIED B12 DEFICIENCY TYPE: ICD-10-CM

## 2022-05-27 DIAGNOSIS — D50.9 MICROCYTIC HYPOCHROMIC ANEMIA: Primary | ICD-10-CM

## 2022-05-27 DIAGNOSIS — D50.9 IRON DEFICIENCY ANEMIA, UNSPECIFIED IRON DEFICIENCY ANEMIA TYPE: Primary | ICD-10-CM

## 2022-05-27 DIAGNOSIS — C50.919 MALIGNANT NEOPLASM OF FEMALE BREAST, UNSPECIFIED ESTROGEN RECEPTOR STATUS, UNSPECIFIED LATERALITY, UNSPECIFIED SITE OF BREAST (HCC): ICD-10-CM

## 2022-05-27 DIAGNOSIS — M81.0 SENILE OSTEOPOROSIS: ICD-10-CM

## 2022-05-27 PROBLEM — N18.30 CHRONIC RENAL DISEASE, STAGE III (HCC): Status: ACTIVE | Noted: 2022-05-27

## 2022-05-27 LAB
ALBUMIN SERPL-MCNC: 4.1 G/DL (ref 3.5–5.2)
ALP BLD-CCNC: 49 U/L (ref 35–104)
ALT SERPL-CCNC: 10 U/L (ref 9–52)
ANION GAP SERPL CALCULATED.3IONS-SCNC: 14 MMOL/L (ref 7–19)
AST SERPL-CCNC: 22 U/L (ref 14–36)
BASOPHILS ABSOLUTE: 0.08 K/UL (ref 0.01–0.08)
BASOPHILS RELATIVE PERCENT: 0.8 % (ref 0.1–1.2)
BILIRUB SERPL-MCNC: 0.4 MG/DL (ref 0.2–1.3)
BUN BLDV-MCNC: 27 MG/DL (ref 7–17)
CA 15-3: 9 U/ML (ref 0–25)
CALCIUM SERPL-MCNC: 10.3 MG/DL (ref 8.4–10.2)
CHLORIDE BLD-SCNC: 101 MMOL/L (ref 98–111)
CO2: 27 MMOL/L (ref 22–29)
CREAT SERPL-MCNC: 1 MG/DL (ref 0.5–1)
EOSINOPHILS ABSOLUTE: 0.23 K/UL (ref 0.04–0.54)
EOSINOPHILS RELATIVE PERCENT: 2.3 % (ref 0.7–7)
GFR NON-AFRICAN AMERICAN: 52
GLUCOSE BLD-MCNC: 61 MG/DL (ref 74–106)
HCT VFR BLD CALC: 43.7 % (ref 34.1–44.9)
HEMOGLOBIN: 13.1 G/DL (ref 11.2–15.7)
LYMPHOCYTES ABSOLUTE: 3.3 K/UL (ref 1.18–3.74)
LYMPHOCYTES RELATIVE PERCENT: 32.6 % (ref 19.3–53.1)
MCH RBC QN AUTO: 31.2 PG (ref 25.6–32.2)
MCHC RBC AUTO-ENTMCNC: 30 G/DL (ref 32.3–35.5)
MCV RBC AUTO: 104 FL (ref 79.4–94.8)
MONOCYTES ABSOLUTE: 0.9 K/UL (ref 0.24–0.82)
MONOCYTES RELATIVE PERCENT: 8.9 % (ref 4.7–12.5)
NEUTROPHILS ABSOLUTE: 5.57 K/UL (ref 1.56–6.13)
NEUTROPHILS RELATIVE PERCENT: 55.1 % (ref 34–71.1)
PDW BLD-RTO: 13.4 % (ref 11.7–14.4)
PLATELET # BLD: 211 K/UL (ref 182–369)
PMV BLD AUTO: 9.7 FL (ref 7.4–10.4)
POTASSIUM SERPL-SCNC: 3.9 MMOL/L (ref 3.5–5.1)
RBC # BLD: 4.2 M/UL (ref 3.93–5.22)
SODIUM BLD-SCNC: 142 MMOL/L (ref 137–145)
TOTAL PROTEIN: 7.3 G/DL (ref 6.3–8.2)
WBC # BLD: 10.11 K/UL (ref 3.98–10.04)

## 2022-05-27 PROCEDURE — 99212 OFFICE O/P EST SF 10 MIN: CPT

## 2022-05-27 PROCEDURE — 6360000002 HC RX W HCPCS: Performed by: PHYSICIAN ASSISTANT

## 2022-05-27 PROCEDURE — 36415 COLL VENOUS BLD VENIPUNCTURE: CPT

## 2022-05-27 PROCEDURE — 80053 COMPREHEN METABOLIC PANEL: CPT

## 2022-05-27 PROCEDURE — 85025 COMPLETE CBC W/AUTO DIFF WBC: CPT

## 2022-05-27 PROCEDURE — 99214 OFFICE O/P EST MOD 30 MIN: CPT | Performed by: PHYSICIAN ASSISTANT

## 2022-05-27 PROCEDURE — 1123F ACP DISCUSS/DSCN MKR DOCD: CPT | Performed by: PHYSICIAN ASSISTANT

## 2022-05-27 PROCEDURE — 36415 COLL VENOUS BLD VENIPUNCTURE: CPT | Performed by: PHYSICIAN ASSISTANT

## 2022-05-27 PROCEDURE — 96372 THER/PROPH/DIAG INJ SC/IM: CPT

## 2022-05-27 RX ORDER — METHYLPREDNISOLONE SODIUM SUCCINATE 125 MG/2ML
125 INJECTION, POWDER, LYOPHILIZED, FOR SOLUTION INTRAMUSCULAR; INTRAVENOUS PRN
OUTPATIENT
Start: 2022-05-27

## 2022-05-27 RX ORDER — DIPHENHYDRAMINE HYDROCHLORIDE 50 MG/ML
50 INJECTION INTRAMUSCULAR; INTRAVENOUS PRN
OUTPATIENT
Start: 2022-11-25

## 2022-05-27 RX ORDER — METHYLPREDNISOLONE SODIUM SUCCINATE 125 MG/2ML
125 INJECTION, POWDER, LYOPHILIZED, FOR SOLUTION INTRAMUSCULAR; INTRAVENOUS PRN
OUTPATIENT
Start: 2022-11-25

## 2022-05-27 RX ORDER — DIPHENHYDRAMINE HYDROCHLORIDE 50 MG/ML
50 INJECTION INTRAMUSCULAR; INTRAVENOUS PRN
OUTPATIENT
Start: 2022-05-27

## 2022-05-27 RX ADMIN — DENOSUMAB 60 MG: 60 INJECTION SUBCUTANEOUS at 12:21

## 2022-05-27 ASSESSMENT — ENCOUNTER SYMPTOMS
COLOR CHANGE: 0
DIARRHEA: 0
COUGH: 0
PHOTOPHOBIA: 0
WHEEZING: 0
ABDOMINAL DISTENTION: 0
VOMITING: 0
BACK PAIN: 1
VOICE CHANGE: 0
BLOOD IN STOOL: 0
EYE DISCHARGE: 0
ABDOMINAL PAIN: 0
CONSTIPATION: 0
SHORTNESS OF BREATH: 0
SORE THROAT: 0
TROUBLE SWALLOWING: 0
EYE ITCHING: 0
NAUSEA: 0

## 2022-06-01 LAB
+IMM: ABNORMAL
ALBUMIN SERPL-MCNC: 3.52 G/DL (ref 3.75–5.01)
ALPHA-1-GLOBULIN: 0.37 G/DL (ref 0.19–0.46)
ALPHA-2-GLOBULIN: 1.08 G/DL (ref 0.48–1.05)
BETA GLOBULIN: 0.97 G/DL (ref 0.48–1.1)
GAMMA GLOBULIN: 0.97 G/DL (ref 0.62–1.51)
IGA: 373 MG/DL (ref 68–408)
IGG: 894 MG/DL (ref 768–1632)
IGM: 51 MG/DL (ref 35–263)
KAPPA FREE LIGHT CHAINS QNT: 52.07 MG/L (ref 3.3–19.4)
KAPPA/LAMBDA FREE LIGHT CHAIN RATIO: 1.56 (ref 0.26–1.65)
LAMBDA FREE LIGHT CHAINS QNT: 33.43 MG/L (ref 5.71–26.3)
SPE/IFE INTERPRETATION: ABNORMAL
TOTAL PROTEIN: 6.9 G/DL (ref 6.3–8.2)

## 2022-06-02 ENCOUNTER — TELEPHONE (OUTPATIENT)
Dept: HEMATOLOGY | Age: 87
End: 2022-06-02

## 2022-06-02 NOTE — TELEPHONE ENCOUNTER
Spoke to [employer] daughter. Instructed her to have her mother hold her Vit D and Calcium supplements. She said she would.

## 2022-12-02 DIAGNOSIS — D50.9 IRON DEFICIENCY ANEMIA, UNSPECIFIED IRON DEFICIENCY ANEMIA TYPE: ICD-10-CM

## 2022-12-02 DIAGNOSIS — Z85.3 HISTORY OF BREAST CANCER: Primary | ICD-10-CM

## 2022-12-05 ENCOUNTER — TELEPHONE (OUTPATIENT)
Dept: HEMATOLOGY | Age: 87
End: 2022-12-05

## 2023-01-17 ENCOUNTER — TELEPHONE (OUTPATIENT)
Dept: HEMATOLOGY | Age: 88
End: 2023-01-17

## 2023-01-17 NOTE — TELEPHONE ENCOUNTER
Patient's son got back with me to let us know that his mother will be establishing care in [place] so that she doesn't have to travel far.  He also stated that she will be reaching out to us for records once she establishes care closer to home

## 2023-01-17 NOTE — TELEPHONE ENCOUNTER
COULDN'T GET A HOLD OF PATIENT. CONTACTED HER SON CANDELARIO THAT'S LISTED AS AN EMERGENCY CONTACT TO SEE IF I COULD GET A BETTER NUMBER FOR HIS MOM. Bryce Morejon HE STATED SHE IS NOW LIVING IN [place] AND WOULD REACH BACK OUT TO US TO SEE IF SHE STILL WANTED TO BE SEEN HERE. ..  IM WAITING TO HEAR BACK FROM EITHER PATIENT OR HER SON

## 2023-05-05 DIAGNOSIS — I48.19 PERSISTENT ATRIAL FIBRILLATION (HCC): ICD-10-CM

## 2023-05-08 RX ORDER — APIXABAN 2.5 MG/1
TABLET, FILM COATED ORAL
Qty: 60 TABLET | Refills: 11 | Status: SHIPPED | OUTPATIENT
Start: 2023-05-08

## 2023-09-15 ENCOUNTER — TELEPHONE (OUTPATIENT)
Dept: INFUSION THERAPY | Age: 88
End: 2023-09-15

## 2024-01-24 ENCOUNTER — OFFICE VISIT (OUTPATIENT)
Dept: PRIMARY CARE CLINIC | Age: 89
End: 2024-01-24
Payer: MEDICARE

## 2024-01-24 VITALS
WEIGHT: 122 LBS | RESPIRATION RATE: 16 BRPM | DIASTOLIC BLOOD PRESSURE: 74 MMHG | BODY MASS INDEX: 26.32 KG/M2 | SYSTOLIC BLOOD PRESSURE: 128 MMHG | HEART RATE: 78 BPM | TEMPERATURE: 97.2 F | HEIGHT: 57 IN | OXYGEN SATURATION: 98 %

## 2024-01-24 DIAGNOSIS — D47.2 MONOCLONAL GAMMOPATHY PRESENT ON SERUM PROTEIN ELECTROPHORESIS: ICD-10-CM

## 2024-01-24 DIAGNOSIS — Z13.31 POSITIVE DEPRESSION SCREENING: ICD-10-CM

## 2024-01-24 DIAGNOSIS — M41.9 SCOLIOSIS OF THORACIC SPINE, UNSPECIFIED SCOLIOSIS TYPE: ICD-10-CM

## 2024-01-24 DIAGNOSIS — G62.9 PERIPHERAL POLYNEUROPATHY: ICD-10-CM

## 2024-01-24 DIAGNOSIS — I48.20 CHRONIC ATRIAL FIBRILLATION (HCC): Chronic | ICD-10-CM

## 2024-01-24 DIAGNOSIS — Z85.3 HX: BREAST CANCER: ICD-10-CM

## 2024-01-24 DIAGNOSIS — E11.9 TYPE 2 DIABETES MELLITUS WITHOUT COMPLICATION, WITHOUT LONG-TERM CURRENT USE OF INSULIN (HCC): ICD-10-CM

## 2024-01-24 DIAGNOSIS — Z91.81 HX OF FALLING: ICD-10-CM

## 2024-01-24 DIAGNOSIS — E78.2 MIXED HYPERLIPIDEMIA: ICD-10-CM

## 2024-01-24 DIAGNOSIS — K52.9 CHRONIC DIARRHEA: ICD-10-CM

## 2024-01-24 DIAGNOSIS — Z79.899 MEDICATION MANAGEMENT: ICD-10-CM

## 2024-01-24 DIAGNOSIS — Z00.00 MEDICARE ANNUAL WELLNESS VISIT, SUBSEQUENT: Primary | ICD-10-CM

## 2024-01-24 DIAGNOSIS — I10 ESSENTIAL HYPERTENSION: ICD-10-CM

## 2024-01-24 PROBLEM — H35.373 EPIRETINAL MEMBRANE (ERM) OF BOTH EYES: Status: ACTIVE | Noted: 2023-06-20

## 2024-01-24 PROBLEM — H40.1131 PRIMARY OPEN ANGLE GLAUCOMA (POAG) OF BOTH EYES, MILD STAGE: Status: ACTIVE | Noted: 2023-06-20

## 2024-01-24 PROBLEM — D50.9 IRON DEFICIENCY ANEMIA: Status: ACTIVE | Noted: 2023-03-30

## 2024-01-24 PROBLEM — R79.89 ELEVATED LFTS: Status: ACTIVE | Noted: 2023-03-30

## 2024-01-24 PROBLEM — R60.0 BILATERAL LEG EDEMA: Status: ACTIVE | Noted: 2023-08-31

## 2024-01-24 PROBLEM — H35.3111 EARLY DRY STAGE NONEXUDATIVE AGE-RELATED MACULAR DEGENERATION OF RIGHT EYE: Status: ACTIVE | Noted: 2022-07-11

## 2024-01-24 PROBLEM — M81.0 AGE-RELATED OSTEOPOROSIS WITHOUT CURRENT PATHOLOGICAL FRACTURE: Status: ACTIVE | Noted: 2022-07-11

## 2024-01-24 PROBLEM — Z95.828 S/P INSERTION OF IVC (INFERIOR VENA CAVAL) FILTER: Status: ACTIVE | Noted: 2022-07-11

## 2024-01-24 PROBLEM — F34.1 DYSTHYMIA: Status: ACTIVE | Noted: 2022-07-11

## 2024-01-24 LAB
ALBUMIN SERPL-MCNC: 3.9 G/DL (ref 3.5–5.2)
ALCOHOL URINE: NORMAL
ALP SERPL-CCNC: 99 U/L (ref 35–104)
ALT SERPL-CCNC: 6 U/L (ref 5–33)
AMPHETAMINE SCREEN, URINE: NORMAL
ANION GAP SERPL CALCULATED.3IONS-SCNC: 15 MMOL/L (ref 7–19)
AST SERPL-CCNC: 15 U/L (ref 5–32)
BARBITURATE SCREEN, URINE: NORMAL
BENZODIAZEPINE SCREEN, URINE: NORMAL
BILIRUB SERPL-MCNC: 0.4 MG/DL (ref 0.2–1.2)
BUN SERPL-MCNC: 17 MG/DL (ref 8–23)
BUPRENORPHINE URINE: NORMAL
CALCIUM SERPL-MCNC: 10.5 MG/DL (ref 8.8–10.2)
CHLORIDE SERPL-SCNC: 104 MMOL/L (ref 98–111)
CHOLEST SERPL-MCNC: 141 MG/DL (ref 160–199)
CO2 SERPL-SCNC: 24 MMOL/L (ref 22–29)
COCAINE METABOLITE SCREEN URINE: NORMAL
CREAT SERPL-MCNC: 1 MG/DL (ref 0.5–0.9)
ERYTHROCYTE [DISTWIDTH] IN BLOOD BY AUTOMATED COUNT: 13.3 % (ref 11.5–14.5)
FENTANYL SCREEN, URINE: NORMAL
GABAPENTIN SCREEN, URINE: NORMAL
GLUCOSE SERPL-MCNC: 100 MG/DL (ref 74–109)
HBA1C MFR BLD: 6.1 % (ref 4–6)
HCT VFR BLD AUTO: 38.6 % (ref 37–47)
HDLC SERPL-MCNC: 75 MG/DL (ref 65–121)
HGB BLD-MCNC: 12.3 G/DL (ref 12–16)
LDLC SERPL CALC-MCNC: 49 MG/DL
MCH RBC QN AUTO: 29.6 PG (ref 27–31)
MCHC RBC AUTO-ENTMCNC: 31.9 G/DL (ref 33–37)
MCV RBC AUTO: 92.8 FL (ref 81–99)
MDMA URINE: NORMAL
METHADONE SCREEN, URINE: NORMAL
METHAMPHETAMINE, URINE: NORMAL
OPIATE SCREEN URINE: NORMAL
OXYCODONE SCREEN URINE: NORMAL
PHENCYCLIDINE SCREEN URINE: NORMAL
PLATELET # BLD AUTO: 193 K/UL (ref 130–400)
PMV BLD AUTO: 9.9 FL (ref 9.4–12.3)
POTASSIUM SERPL-SCNC: 3.9 MMOL/L (ref 3.5–5)
PROPOXYPHENE SCREEN, URINE: NORMAL
PROT SERPL-MCNC: 6.8 G/DL (ref 6.6–8.7)
RBC # BLD AUTO: 4.16 M/UL (ref 4.2–5.4)
SODIUM SERPL-SCNC: 143 MMOL/L (ref 136–145)
SYNTHETIC CANNABINOIDS(K2) SCREEN, URINE: NORMAL
THC SCREEN, URINE: NORMAL
TRAMADOL SCREEN URINE: NORMAL
TRICYCLIC ANTIDEPRESSANTS, UR: NORMAL
TRIGL SERPL-MCNC: 85 MG/DL (ref 0–149)
WBC # BLD AUTO: 8.4 K/UL (ref 4.8–10.8)

## 2024-01-24 PROCEDURE — 99214 OFFICE O/P EST MOD 30 MIN: CPT | Performed by: FAMILY MEDICINE

## 2024-01-24 PROCEDURE — G0439 PPPS, SUBSEQ VISIT: HCPCS | Performed by: FAMILY MEDICINE

## 2024-01-24 PROCEDURE — 3044F HG A1C LEVEL LT 7.0%: CPT | Performed by: FAMILY MEDICINE

## 2024-01-24 PROCEDURE — 1123F ACP DISCUSS/DSCN MKR DOCD: CPT | Performed by: FAMILY MEDICINE

## 2024-01-24 RX ORDER — COVID-19 ANTIGEN TEST
KIT MISCELLANEOUS PRN
COMMUNITY

## 2024-01-24 RX ORDER — FUROSEMIDE 40 MG/1
TABLET ORAL
COMMUNITY

## 2024-01-24 RX ORDER — LOPERAMIDE HYDROCHLORIDE 2 MG/1
2 CAPSULE ORAL EVERY MORNING
COMMUNITY
Start: 2022-09-01 | End: 2024-05-26

## 2024-01-24 RX ORDER — GABAPENTIN 100 MG/1
CAPSULE ORAL
COMMUNITY
Start: 2023-11-01 | End: 2024-01-24 | Stop reason: DRUGHIGH

## 2024-01-24 RX ORDER — GABAPENTIN 100 MG/1
CAPSULE ORAL
Qty: 90 CAPSULE | Refills: 2 | Status: SHIPPED | OUTPATIENT
Start: 2024-01-24 | End: 2024-03-24

## 2024-01-24 RX ORDER — ATORVASTATIN CALCIUM 80 MG/1
80 TABLET, FILM COATED ORAL NIGHTLY
COMMUNITY
Start: 2023-08-31 | End: 2024-08-31

## 2024-01-24 RX ORDER — SERTRALINE HYDROCHLORIDE 25 MG/1
25 TABLET, FILM COATED ORAL DAILY
COMMUNITY

## 2024-01-24 SDOH — ECONOMIC STABILITY: FOOD INSECURITY: WITHIN THE PAST 12 MONTHS, THE FOOD YOU BOUGHT JUST DIDN'T LAST AND YOU DIDN'T HAVE MONEY TO GET MORE.: NEVER TRUE

## 2024-01-24 SDOH — ECONOMIC STABILITY: INCOME INSECURITY: HOW HARD IS IT FOR YOU TO PAY FOR THE VERY BASICS LIKE FOOD, HOUSING, MEDICAL CARE, AND HEATING?: NOT HARD AT ALL

## 2024-01-24 SDOH — ECONOMIC STABILITY: HOUSING INSECURITY
IN THE LAST 12 MONTHS, WAS THERE A TIME WHEN YOU DID NOT HAVE A STEADY PLACE TO SLEEP OR SLEPT IN A SHELTER (INCLUDING NOW)?: NO

## 2024-01-24 SDOH — ECONOMIC STABILITY: FOOD INSECURITY: WITHIN THE PAST 12 MONTHS, YOU WORRIED THAT YOUR FOOD WOULD RUN OUT BEFORE YOU GOT MONEY TO BUY MORE.: NEVER TRUE

## 2024-01-24 ASSESSMENT — PATIENT HEALTH QUESTIONNAIRE - PHQ9
6. FEELING BAD ABOUT YOURSELF - OR THAT YOU ARE A FAILURE OR HAVE LET YOURSELF OR YOUR FAMILY DOWN: 3
SUM OF ALL RESPONSES TO PHQ QUESTIONS 1-9: 15
9. THOUGHTS THAT YOU WOULD BE BETTER OFF DEAD, OR OF HURTING YOURSELF: 2
5. POOR APPETITE OR OVEREATING: 0
2. FEELING DOWN, DEPRESSED OR HOPELESS: 3
8. MOVING OR SPEAKING SO SLOWLY THAT OTHER PEOPLE COULD HAVE NOTICED. OR THE OPPOSITE, BEING SO FIGETY OR RESTLESS THAT YOU HAVE BEEN MOVING AROUND A LOT MORE THAN USUAL: 0
1. LITTLE INTEREST OR PLEASURE IN DOING THINGS: 3
3. TROUBLE FALLING OR STAYING ASLEEP: 1
SUM OF ALL RESPONSES TO PHQ QUESTIONS 1-9: 15
SUM OF ALL RESPONSES TO PHQ9 QUESTIONS 1 & 2: 6
7. TROUBLE CONCENTRATING ON THINGS, SUCH AS READING THE NEWSPAPER OR WATCHING TELEVISION: 0
SUM OF ALL RESPONSES TO PHQ QUESTIONS 1-9: 13
4. FEELING TIRED OR HAVING LITTLE ENERGY: 3
SUM OF ALL RESPONSES TO PHQ QUESTIONS 1-9: 15
10. IF YOU CHECKED OFF ANY PROBLEMS, HOW DIFFICULT HAVE THESE PROBLEMS MADE IT FOR YOU TO DO YOUR WORK, TAKE CARE OF THINGS AT HOME, OR GET ALONG WITH OTHER PEOPLE: 1

## 2024-01-24 ASSESSMENT — COLUMBIA-SUICIDE SEVERITY RATING SCALE - C-SSRS
6. HAVE YOU EVER DONE ANYTHING, STARTED TO DO ANYTHING, OR PREPARED TO DO ANYTHING TO END YOUR LIFE?: YES
7. DID THIS OCCUR IN THE LAST THREE MONTHS: NO
1. WITHIN THE PAST MONTH, HAVE YOU WISHED YOU WERE DEAD OR WISHED YOU COULD GO TO SLEEP AND NOT WAKE UP?: YES
2. HAVE YOU ACTUALLY HAD ANY THOUGHTS OF KILLING YOURSELF?: NO

## 2024-01-24 ASSESSMENT — LIFESTYLE VARIABLES
HOW MANY STANDARD DRINKS CONTAINING ALCOHOL DO YOU HAVE ON A TYPICAL DAY: PATIENT DOES NOT DRINK
HOW OFTEN DO YOU HAVE A DRINK CONTAINING ALCOHOL: NEVER

## 2024-01-24 NOTE — PROGRESS NOTES
This encounter was performed under my, Margarita Jerome MD’s, direct supervision, 1/24/2024.  PHQ-9 score today: (PHQ-9 Total Score: 15), additional evaluation and assessment performed, follow-up plan includes but not limited to: Medication management and Referral to /Specialist  for evaluation and management.   
regularly?: Yes    Body mass index is 26.4 kg/m².      Inactivity Interventions:  See AVS for additional education material            ADL's:   Patient reports needing help with:  Select all that apply: (!) Bathing  Select all that apply: (!) Laundry, Housekeeping, Banking/Finances, Shopping, Telephone Use, Food Preparation, Transportation, Taking Medications  Interventions:  See above               Objective   Vitals:    01/24/24 0934   BP: 128/74   Site: Left Upper Arm   Position: Sitting   Pulse: 78   Resp: 16   Temp: 97.2 °F (36.2 °C)   SpO2: 98%   Weight: 55.3 kg (122 lb)   Height: 1.448 m (4' 9\")      Body mass index is 26.4 kg/m².            Allergies   Allergen Reactions    Penicillins      Just does not work    Sulfa Antibiotics Rash     Prior to Visit Medications    Medication Sig Taking? Authorizing Provider   furosemide (LASIX) 40 MG tablet TAKE 1 TABLET BY MOUTH EVERY DAY AS NEEDED FOR LEG SWELLING Yes Jam Foley MD   gabapentin (NEURONTIN) 100 MG capsule  Yes Jam Foley MD   loperamide (IMODIUM) 2 MG capsule Take 1 capsule by mouth every morning Yes Jam Foley MD   Naproxen Sodium 220 MG CAPS Take by mouth as needed Yes ProviderJam MD   atorvastatin (LIPITOR) 80 MG tablet Take 1 tablet by mouth nightly Yes Jam Foley MD   sertraline (ZOLOFT) 25 MG tablet Take 1 tablet by mouth daily Yes Jam Foley MD   ELIQUIS 2.5 MG TABS tablet TAKE 1 TABLET BY MOUTH TWICE DAILY Yes Nidia Rivera APRN   omeprazole (PRILOSEC) 40 MG delayed release capsule TAKE 1 CAPSULE BY MOUTH DAILY FOR STOMACH Yes Margarita Jerome MD   Cholecalciferol (VITAMIN D3) 50 MCG (2000 UT) CAPS Take by mouth Yes Jam Foley MD   ferrous sulfate (FE TABS 325) 325 (65 Fe) MG EC tablet Take 1 tablet by mouth 3 times daily (with meals) Yes Jame Bacon PA-C   Calcium Lactate 100 MG TABS Take by mouth Yes Jam Foley MD   Calcium Carbonate-Vitamin D (OYSTER

## 2024-01-28 PROBLEM — G45.9 TIA (TRANSIENT ISCHEMIC ATTACK): Status: RESOLVED | Noted: 2020-09-07 | Resolved: 2024-01-28

## 2024-02-07 ENCOUNTER — OFFICE VISIT (OUTPATIENT)
Dept: CARDIOLOGY CLINIC | Age: 89
End: 2024-02-07
Payer: MEDICARE

## 2024-02-07 VITALS
WEIGHT: 122 LBS | BODY MASS INDEX: 22.45 KG/M2 | HEART RATE: 92 BPM | DIASTOLIC BLOOD PRESSURE: 64 MMHG | SYSTOLIC BLOOD PRESSURE: 128 MMHG | HEIGHT: 62 IN

## 2024-02-07 DIAGNOSIS — I48.19 PERSISTENT ATRIAL FIBRILLATION (HCC): Primary | ICD-10-CM

## 2024-02-07 DIAGNOSIS — Z95.2 S/P AORTIC VALVE REPLACEMENT: ICD-10-CM

## 2024-02-07 DIAGNOSIS — I10 ESSENTIAL HYPERTENSION: ICD-10-CM

## 2024-02-07 PROCEDURE — 1123F ACP DISCUSS/DSCN MKR DOCD: CPT | Performed by: CLINICAL NURSE SPECIALIST

## 2024-02-07 PROCEDURE — 93000 ELECTROCARDIOGRAM COMPLETE: CPT | Performed by: CLINICAL NURSE SPECIALIST

## 2024-02-07 PROCEDURE — 99203 OFFICE O/P NEW LOW 30 MIN: CPT | Performed by: CLINICAL NURSE SPECIALIST

## 2024-02-07 NOTE — PATIENT INSTRUCTIONS
Watch for fluid retention or worsening shortness of breath when laying down  Take the lasix  Maintain good blood pressure control-goal<130/80 at rest  Maintain good cholesterol control LDL goal<70 with arterial disease  If you are diabetic work to keep/obtain hemoglobin A1c< 7    Follow up in 6 mos   Call with any questions or concerns  Follow up with Margarita Jerome MD for non cardiac problems  Report any new problems  Cardiovascular Fitness-Exercise as tolerated.      Fall precautions   Cardiac / Healthy Diet- Avoid processed high fat foods, maintain low sodium/salt   Continue current medications as directed  Continue plan of treatment  It is always recommended that you bring your medications bottles with you to each visit - this is for your safety!

## 2024-02-22 ENCOUNTER — TELEPHONE (OUTPATIENT)
Dept: HEMATOLOGY | Age: 89
End: 2024-02-22

## 2024-02-22 NOTE — TELEPHONE ENCOUNTER
Called patient to remind of appointment on 02/26/2024 and was unable to leave a message or talk to patient due the phone kept ringing and no one ever answered or no option to leave voicemail.

## 2024-02-23 NOTE — PROGRESS NOTES
10/28/2019 by Dr. Lamar.  Possible polypoid tissue was excised, pathology revealed only normal colonic mucosa without any polyp changes.    Cervical cancer screening.  Prior ROSETTA        Immunization History   Administered Date(s) Administered    COVID-19, Moderna, PF, 100mcg/0.5mL 02/05/2021, 03/08/2021       Orders Placed This Encounter   Procedures    DEXA BONE DENSITY 2 SITES    CBC with Auto Differential    Comprehensive Metabolic Panel    MONOCLONAL PROTEIN AND FLC, SERUM    Iron and TIBC    Ferritin    Cancer Antigen 15-3         Return in about 3 months (around 5/26/2024) for With Jame.  CBC, follow-up bone density, possible Prolia injection    Jame Bacon PA-C  3:45 PM  2/26/2024

## 2024-02-26 ENCOUNTER — HOSPITAL ENCOUNTER (OUTPATIENT)
Dept: INFUSION THERAPY | Age: 89
Discharge: HOME | End: 2024-02-26
Payer: MEDICARE

## 2024-02-26 ENCOUNTER — OFFICE VISIT (OUTPATIENT)
Dept: HEMATOLOGY | Age: 89
End: 2024-02-26
Payer: MEDICARE

## 2024-02-26 VITALS
BODY MASS INDEX: 22.38 KG/M2 | HEART RATE: 76 BPM | TEMPERATURE: 97.6 F | OXYGEN SATURATION: 96 % | DIASTOLIC BLOOD PRESSURE: 80 MMHG | SYSTOLIC BLOOD PRESSURE: 120 MMHG | HEIGHT: 62 IN | WEIGHT: 121.6 LBS

## 2024-02-26 DIAGNOSIS — D47.2 MGUS (MONOCLONAL GAMMOPATHY OF UNKNOWN SIGNIFICANCE): ICD-10-CM

## 2024-02-26 DIAGNOSIS — D50.9 IRON DEFICIENCY ANEMIA, UNSPECIFIED IRON DEFICIENCY ANEMIA TYPE: ICD-10-CM

## 2024-02-26 DIAGNOSIS — D47.2 MGUS (MONOCLONAL GAMMOPATHY OF UNKNOWN SIGNIFICANCE): Primary | ICD-10-CM

## 2024-02-26 DIAGNOSIS — M81.0 SENILE OSTEOPOROSIS: ICD-10-CM

## 2024-02-26 DIAGNOSIS — Z85.3 HISTORY OF BREAST CANCER: ICD-10-CM

## 2024-02-26 LAB
ALBUMIN SERPL-MCNC: 4 G/DL (ref 3.5–5.2)
ALP SERPL-CCNC: 91 U/L (ref 35–104)
ALT SERPL-CCNC: 13 U/L (ref 9–52)
ANION GAP SERPL CALCULATED.3IONS-SCNC: 10 MMOL/L (ref 7–19)
AST SERPL-CCNC: 26 U/L (ref 14–36)
BASOPHILS # BLD: 0.05 K/UL (ref 0.01–0.08)
BASOPHILS NFR BLD: 0.6 % (ref 0.1–1.2)
BILIRUB SERPL-MCNC: 0.4 MG/DL (ref 0.2–1.3)
BUN SERPL-MCNC: 24 MG/DL (ref 7–17)
CA 15-3: 7 U/ML (ref 0–25)
CALCIUM SERPL-MCNC: 10 MG/DL (ref 8.4–10.2)
CHLORIDE SERPL-SCNC: 106 MMOL/L (ref 98–111)
CO2 SERPL-SCNC: 26 MMOL/L (ref 22–29)
CREAT SERPL-MCNC: 1.1 MG/DL (ref 0.5–1)
EOSINOPHIL # BLD: 0.09 K/UL (ref 0.04–0.54)
EOSINOPHIL NFR BLD: 1 % (ref 0.7–7)
ERYTHROCYTE [DISTWIDTH] IN BLOOD BY AUTOMATED COUNT: 13.7 % (ref 11.7–14.4)
FERRITIN SERPL-MCNC: 30.1 NG/ML (ref 13–150)
GLOBULIN: 3.3 G/DL
GLUCOSE SERPL-MCNC: 118 MG/DL (ref 74–106)
HCT VFR BLD AUTO: 36.6 % (ref 34.1–44.9)
HGB BLD-MCNC: 11.6 G/DL (ref 11.2–15.7)
IRON SATN MFR SERPL: 40 % (ref 14–50)
IRON SERPL-MCNC: 133 UG/DL (ref 37–145)
LYMPHOCYTES # BLD: 1.46 K/UL (ref 1.18–3.74)
LYMPHOCYTES NFR BLD: 17 % (ref 19.3–53.1)
MCH RBC QN AUTO: 29.1 PG (ref 25.6–32.2)
MCHC RBC AUTO-ENTMCNC: 31.7 G/DL (ref 32.3–35.5)
MCV RBC AUTO: 92 FL (ref 79.4–94.8)
MONOCYTES # BLD: 0.73 K/UL (ref 0.24–0.82)
MONOCYTES NFR BLD: 8.5 % (ref 4.7–12.5)
NEUTROPHILS # BLD: 6.26 K/UL (ref 1.56–6.13)
NEUTS SEG NFR BLD: 72.7 % (ref 34–71.1)
PLATELET # BLD AUTO: 203 K/UL (ref 182–369)
PMV BLD AUTO: 9.6 FL (ref 7.4–10.4)
POTASSIUM SERPL-SCNC: 3.7 MMOL/L (ref 3.5–5.1)
PROT SERPL-MCNC: 7.3 G/DL (ref 6.3–8.2)
RBC # BLD AUTO: 3.98 M/UL (ref 3.93–5.22)
SODIUM SERPL-SCNC: 142 MMOL/L (ref 137–145)
TIBC SERPL-MCNC: 329 UG/DL (ref 250–400)
WBC # BLD AUTO: 8.61 K/UL (ref 3.98–10.04)

## 2024-02-26 PROCEDURE — 99214 OFFICE O/P EST MOD 30 MIN: CPT | Performed by: PHYSICIAN ASSISTANT

## 2024-02-26 PROCEDURE — 1123F ACP DISCUSS/DSCN MKR DOCD: CPT | Performed by: PHYSICIAN ASSISTANT

## 2024-02-26 PROCEDURE — 85025 COMPLETE CBC W/AUTO DIFF WBC: CPT

## 2024-02-26 PROCEDURE — 99212 OFFICE O/P EST SF 10 MIN: CPT

## 2024-02-26 PROCEDURE — 80053 COMPREHEN METABOLIC PANEL: CPT

## 2024-02-26 PROCEDURE — 36415 COLL VENOUS BLD VENIPUNCTURE: CPT

## 2024-02-26 ASSESSMENT — ENCOUNTER SYMPTOMS
VOICE CHANGE: 0
WHEEZING: 0
ABDOMINAL DISTENTION: 0
SHORTNESS OF BREATH: 0
BLOOD IN STOOL: 0
EYE DISCHARGE: 0
COLOR CHANGE: 0
COUGH: 0
PHOTOPHOBIA: 0
BACK PAIN: 1
DIARRHEA: 0
EYE ITCHING: 0
NAUSEA: 0
TROUBLE SWALLOWING: 0
SORE THROAT: 0
CONSTIPATION: 0
VOMITING: 0
ABDOMINAL PAIN: 0

## 2024-03-02 LAB
ALBUMIN SERPL-MCNC: 3.62 G/DL (ref 3.75–5.01)
ALPHA1 GLOB SERPL ELPH-MCNC: 0.3 G/DL (ref 0.19–0.46)
ALPHA2 GLOB SERPL ELPH-MCNC: 0.93 G/DL (ref 0.48–1.05)
B-GLOBULIN SERPL ELPH-MCNC: 0.94 G/DL (ref 0.48–1.1)
DEPRECATED KAPPA LC FREE/LAMBDA SER: 1.82 {RATIO} (ref 0.26–1.65)
EER MONOCLONAL PROTEIN AND FLC, SERUM: ABNORMAL
GAMMA GLOB SERPL ELPH-MCNC: 0.81 G/DL (ref 0.62–1.51)
IGA SERPL-MCNC: 312 MG/DL (ref 68–408)
IGG SERPL-MCNC: 709 MG/DL (ref 768–1632)
IGM SERPL-MCNC: 39 MG/DL (ref 35–263)
INTERPRETATION SERPL IFE-IMP: ABNORMAL
INTERPRETATION SERPL IFE-IMP: ABNORMAL
KAPPA LC FREE SER-MCNC: 56.09 MG/L (ref 3.3–19.4)
LAMBDA LC FREE SERPL-MCNC: 30.8 MG/L (ref 5.71–26.3)
MONOCLONAL PROTEIN, SERUM: ABNORMAL G/DL
PROT SERPL-MCNC: 6.6 G/DL (ref 6.3–8.2)

## 2024-03-04 ENCOUNTER — HOSPITAL ENCOUNTER (OUTPATIENT)
Dept: WOMENS IMAGING | Age: 89
Discharge: HOME | End: 2024-03-04
Payer: MEDICARE

## 2024-03-04 DIAGNOSIS — M81.0 SENILE OSTEOPOROSIS: ICD-10-CM

## 2024-03-04 PROCEDURE — 77080 DXA BONE DENSITY AXIAL: CPT

## 2024-03-05 DIAGNOSIS — M81.0 AGE-RELATED OSTEOPOROSIS WITHOUT CURRENT PATHOLOGICAL FRACTURE: Primary | ICD-10-CM

## 2024-03-12 RX ORDER — SERTRALINE HYDROCHLORIDE 25 MG/1
25 TABLET, FILM COATED ORAL DAILY
Qty: 90 TABLET | Refills: 1 | Status: SHIPPED | OUTPATIENT
Start: 2024-03-12

## 2024-03-13 RX ORDER — OMEPRAZOLE 40 MG/1
CAPSULE, DELAYED RELEASE ORAL
Qty: 90 CAPSULE | Refills: 3 | Status: SHIPPED | OUTPATIENT
Start: 2024-03-13

## 2024-05-12 DIAGNOSIS — I48.19 PERSISTENT ATRIAL FIBRILLATION (HCC): ICD-10-CM

## 2024-05-14 RX ORDER — APIXABAN 2.5 MG/1
TABLET, FILM COATED ORAL
Qty: 60 TABLET | Refills: 11 | Status: SHIPPED | OUTPATIENT
Start: 2024-05-14

## 2024-05-15 ENCOUNTER — TELEPHONE (OUTPATIENT)
Dept: HEMATOLOGY | Age: 89
End: 2024-05-15

## 2024-05-15 NOTE — TELEPHONE ENCOUNTER
Called Patient and reminded patient of their appointment on 05/20/2024 and patient confirmed they would be here.

## 2024-05-16 NOTE — PROGRESS NOTES
Progress Note      Pt Name: Saundra Finn  YOB: 1935  MRN: [id number]    Date of evaluation: 6/5/2024  History Obtained From:  patient, electronic medical record    CHIEF COMPLAINT:    Chief Complaint   Patient presents with    Follow-up     MGUS (monoclonal gammopathy of unknown significance)  Iron deficiency anemia, unspecified iron deficiency anemia type     Current active problems  Resected Stage II left breast cancer   Senile osteoperosis    HISTORY OF PRESENT ILLNESS:    Saundra Finn is a 88 y.o.  female with a history of resected left stage II breast carcinoma dating back to 12/27/2012.  She was ER [place] negative.  She was HER-2 positive.  She completed adjuvant chemotherapy and Herceptin- for a year.  She has not had any evidence of recurrence.    She did move to [place] to stay with her daughter for 2 years but did not like the caregivers in that area.  She has a history of significant iron deficiency that responded to oral iron replacement.  She has intermittent possible low-grade GI bleeding related to requiring Eliquis 2.5 twice daily for atrial fibrillation.  She denies any transfusions since I last saw her.  She has been doing well.  She is not sure if she is still taking her iron pill or not.  She gets her medications at [facility].      Blood pressure apparently has been doing okay on her current medication.  She does continue taking atorvastatin for cholesterol.  She reports that she has not been taking her calcium and vitamin D.      TUMOR HISTORY: Left stage IIA (T2 N0 M0), ER/[place] negative, Her2 Trudi positive breast cancer, 12/27/12  Saundra was seen in initial oncology consultation on 01/17/13, referred by Dr. Kirsten Schuler with regard to a diagnosis of left breast cancer. Her history dates back to just before Thanksgiving when she noticed a lump in her left breast. A mammogram was done early on 11/20/12 that revealed abnormalities in the left breast in the left lower outer

## 2024-05-21 DIAGNOSIS — G62.9 PERIPHERAL POLYNEUROPATHY: ICD-10-CM

## 2024-05-21 RX ORDER — GABAPENTIN 100 MG/1
CAPSULE ORAL
Qty: 90 CAPSULE | Refills: 2 | Status: SHIPPED | OUTPATIENT
Start: 2024-05-21 | End: 2024-08-21

## 2024-05-21 NOTE — TELEPHONE ENCOUNTER
Provider needs to review PDMP    PDMP Monitoring:    Last PDMP Jame as Reviewed ([place]):  Review User Review Instant Review Result   ISATU SPEAR 1/24/2024 10:38 AM Reviewed PDMP [1]     Urine Drug Screenings (1 yr)       POCT Rapid Drug Screen  Collected: 1/24/2024  1:23 PM (Final result)                  Medication Contract and Consent for Opioid Use Documents Filed       Patient Documents       Type of Document Status Date Received Received By Description    Medication Contract Received 1/24/2024 10:41 AM PAIGE LOPEZ Med Agreement 1-24-24

## 2024-05-30 ENCOUNTER — TELEPHONE (OUTPATIENT)
Dept: HEMATOLOGY | Age: 89
End: 2024-05-30

## 2024-05-30 NOTE — TELEPHONE ENCOUNTER
I called and reminded pt. of their appt on 06/05/2024. Patient is aware to come at time of appt and not lab appt time. Patient confirmed appt date and time.

## 2024-06-05 ENCOUNTER — OFFICE VISIT (OUTPATIENT)
Dept: HEMATOLOGY | Age: 89
End: 2024-06-05
Payer: MEDICARE

## 2024-06-05 ENCOUNTER — HOSPITAL ENCOUNTER (OUTPATIENT)
Dept: INFUSION THERAPY | Age: 89
Discharge: HOME | End: 2024-06-05
Payer: MEDICARE

## 2024-06-05 VITALS
WEIGHT: 118.7 LBS | OXYGEN SATURATION: 99 % | TEMPERATURE: 97.7 F | SYSTOLIC BLOOD PRESSURE: 126 MMHG | HEIGHT: 62 IN | HEART RATE: 74 BPM | DIASTOLIC BLOOD PRESSURE: 74 MMHG | BODY MASS INDEX: 21.84 KG/M2

## 2024-06-05 DIAGNOSIS — Z85.3 HISTORY OF BREAST CANCER: ICD-10-CM

## 2024-06-05 DIAGNOSIS — M81.0 SENILE OSTEOPOROSIS: ICD-10-CM

## 2024-06-05 DIAGNOSIS — D50.9 IRON DEFICIENCY ANEMIA, UNSPECIFIED IRON DEFICIENCY ANEMIA TYPE: ICD-10-CM

## 2024-06-05 DIAGNOSIS — M81.0 AGE-RELATED OSTEOPOROSIS WITHOUT CURRENT PATHOLOGICAL FRACTURE: Primary | ICD-10-CM

## 2024-06-05 DIAGNOSIS — D47.2 MGUS (MONOCLONAL GAMMOPATHY OF UNKNOWN SIGNIFICANCE): ICD-10-CM

## 2024-06-05 DIAGNOSIS — D50.9 IRON DEFICIENCY ANEMIA, UNSPECIFIED IRON DEFICIENCY ANEMIA TYPE: Primary | ICD-10-CM

## 2024-06-05 LAB
ALBUMIN SERPL-MCNC: 4.2 G/DL (ref 3.5–5.2)
ALP SERPL-CCNC: 82 U/L (ref 35–104)
ALT SERPL-CCNC: 10 U/L (ref 9–52)
ANION GAP SERPL CALCULATED.3IONS-SCNC: 7 MMOL/L (ref 7–19)
AST SERPL-CCNC: 27 U/L (ref 14–36)
BASOPHILS # BLD: 0.06 K/UL (ref 0.01–0.08)
BASOPHILS NFR BLD: 0.8 % (ref 0.1–1.2)
BILIRUB SERPL-MCNC: 0.7 MG/DL (ref 0.2–1.3)
BUN SERPL-MCNC: 16 MG/DL (ref 7–17)
CALCIUM SERPL-MCNC: 9.8 MG/DL (ref 8.4–10.2)
CHLORIDE SERPL-SCNC: 102 MMOL/L (ref 98–111)
CO2 SERPL-SCNC: 29 MMOL/L (ref 22–29)
CREAT SERPL-MCNC: 1 MG/DL (ref 0.5–1)
EOSINOPHIL # BLD: 0.11 K/UL (ref 0.04–0.54)
EOSINOPHIL NFR BLD: 1.4 % (ref 0.7–7)
ERYTHROCYTE [DISTWIDTH] IN BLOOD BY AUTOMATED COUNT: 14.3 % (ref 11.7–14.4)
GLOBULIN: 2.7 G/DL
GLUCOSE SERPL-MCNC: 100 MG/DL (ref 74–106)
HCT VFR BLD AUTO: 38.4 % (ref 34.1–44.9)
HGB BLD-MCNC: 12.4 G/DL (ref 11.2–15.7)
LYMPHOCYTES # BLD: 1.93 K/UL (ref 1.18–3.74)
LYMPHOCYTES NFR BLD: 25.1 % (ref 19.3–53.1)
MCH RBC QN AUTO: 30.3 PG (ref 25.6–32.2)
MCHC RBC AUTO-ENTMCNC: 32.3 G/DL (ref 32.3–35.5)
MCV RBC AUTO: 93.9 FL (ref 79.4–94.8)
MONOCYTES # BLD: 0.6 K/UL (ref 0.24–0.82)
MONOCYTES NFR BLD: 7.8 % (ref 4.7–12.5)
NEUTROPHILS # BLD: 4.96 K/UL (ref 1.56–6.13)
NEUTS SEG NFR BLD: 64.5 % (ref 34–71.1)
PHOSPHATE SERPL-MCNC: 4.1 MG/DL (ref 2.5–4.5)
PLATELET # BLD AUTO: 211 K/UL (ref 182–369)
PMV BLD AUTO: 8.9 FL (ref 7.4–10.4)
POTASSIUM SERPL-SCNC: 3.8 MMOL/L (ref 3.5–5.1)
PROT SERPL-MCNC: 6.9 G/DL (ref 6.3–8.2)
RBC # BLD AUTO: 4.09 M/UL (ref 3.93–5.22)
SODIUM SERPL-SCNC: 138 MMOL/L (ref 137–145)
WBC # BLD AUTO: 7.69 K/UL (ref 3.98–10.04)

## 2024-06-05 PROCEDURE — 1123F ACP DISCUSS/DSCN MKR DOCD: CPT | Performed by: PHYSICIAN ASSISTANT

## 2024-06-05 PROCEDURE — 1090F PRES/ABSN URINE INCON ASSESS: CPT | Performed by: PHYSICIAN ASSISTANT

## 2024-06-05 PROCEDURE — 1036F TOBACCO NON-USER: CPT | Performed by: PHYSICIAN ASSISTANT

## 2024-06-05 PROCEDURE — G8420 CALC BMI NORM PARAMETERS: HCPCS | Performed by: PHYSICIAN ASSISTANT

## 2024-06-05 PROCEDURE — 80053 COMPREHEN METABOLIC PANEL: CPT

## 2024-06-05 PROCEDURE — 96372 THER/PROPH/DIAG INJ SC/IM: CPT

## 2024-06-05 PROCEDURE — 99213 OFFICE O/P EST LOW 20 MIN: CPT | Performed by: PHYSICIAN ASSISTANT

## 2024-06-05 PROCEDURE — 85025 COMPLETE CBC W/AUTO DIFF WBC: CPT

## 2024-06-05 PROCEDURE — G8427 DOCREV CUR MEDS BY ELIG CLIN: HCPCS | Performed by: PHYSICIAN ASSISTANT

## 2024-06-05 PROCEDURE — G2211 COMPLEX E/M VISIT ADD ON: HCPCS | Performed by: PHYSICIAN ASSISTANT

## 2024-06-05 PROCEDURE — 84100 ASSAY OF PHOSPHORUS: CPT

## 2024-06-05 PROCEDURE — 99212 OFFICE O/P EST SF 10 MIN: CPT

## 2024-06-05 PROCEDURE — 36415 COLL VENOUS BLD VENIPUNCTURE: CPT

## 2024-06-05 PROCEDURE — 6360000002 HC RX W HCPCS: Performed by: PHYSICIAN ASSISTANT

## 2024-06-05 RX ORDER — ASPIRIN 325 MG
325 TABLET ORAL PRN
COMMUNITY

## 2024-06-05 RX ADMIN — DENOSUMAB 60 MG: 60 INJECTION SUBCUTANEOUS at 10:27

## 2024-06-05 ASSESSMENT — ENCOUNTER SYMPTOMS
ABDOMINAL DISTENTION: 0
SORE THROAT: 0
VOMITING: 0
WHEEZING: 0
PHOTOPHOBIA: 0
VOICE CHANGE: 0
COUGH: 0
COLOR CHANGE: 0
TROUBLE SWALLOWING: 0
CONSTIPATION: 0
NAUSEA: 0
ABDOMINAL PAIN: 0
EYE ITCHING: 0
BLOOD IN STOOL: 0
EYE DISCHARGE: 0
SHORTNESS OF BREATH: 0
BACK PAIN: 1
DIARRHEA: 0

## 2024-08-07 ENCOUNTER — OFFICE VISIT (OUTPATIENT)
Dept: CARDIOLOGY CLINIC | Age: 89
End: 2024-08-07

## 2024-08-07 VITALS
HEIGHT: 59 IN | HEART RATE: 96 BPM | WEIGHT: 112 LBS | OXYGEN SATURATION: 97 % | SYSTOLIC BLOOD PRESSURE: 118 MMHG | DIASTOLIC BLOOD PRESSURE: 84 MMHG | BODY MASS INDEX: 22.58 KG/M2

## 2024-08-07 DIAGNOSIS — I10 ESSENTIAL HYPERTENSION: ICD-10-CM

## 2024-08-07 DIAGNOSIS — Z95.2 S/P AORTIC VALVE REPLACEMENT: ICD-10-CM

## 2024-08-07 DIAGNOSIS — I48.19 PERSISTENT ATRIAL FIBRILLATION (HCC): Primary | ICD-10-CM

## 2024-08-07 RX ORDER — DORZOLAMIDE HYDROCHLORIDE AND TIMOLOL MALEATE 20; 5 MG/ML; MG/ML
2 SOLUTION/ DROPS OPHTHALMIC
COMMUNITY
Start: 2024-07-17

## 2024-08-07 NOTE — PROGRESS NOTES
[facility]  [address] [address]  Phone: [phone]  Fax: [phone]    OFFICE VISIT:  2024    Saundra Finn - : 1935    Reason For Visit:  Saundra is a 88 y.o. female who is here for 6 Month Follow-Up (No symptoms), Atrial Fibrillation, Hypertension, and Cardiac Valve Problem  Previously followed by Dr. Duke and also by [facility].  History of bovine pericardial bioprosthetic valve placed in .  Chronic atrial fibrillation with anticoagulation, hypertension and dyslipidemia.  Last echo 2023 showed preserved LV systolic function.  Mildly dilated atria, mild to moderate MR, moderate TR with well-seated bioprosthetic aortic valve    Reestablish care back in February after moving back from [place].  Has peripheral edema.  She states it does improve with elevation.  She takes her Lasix as needed       Subjective  Saundra denies exertional chest pain, shortness of breath, orthopnea, paroxysmal nocturnal dyspnea, syncope, presyncope, arrhythmia, edema and fatigue.  The patient denies numbness or weakness to suggest cerebrovascular accident or transient ischemic attack.    Margarita Jerome MD is PCP and follows labs.  Saundra Finn has the following history as recorded in [facility]:    Patient Active Problem List    Diagnosis Date Noted    Chronic renal disease, stage III (HCC) [654827] 2022    Bilateral leg edema 2023    Neuropathy 2023    Epiretinal membrane (ERM) of both eyes 2023    Primary open angle glaucoma (POAG) of both eyes, mild stage 2023    Elevated LFTs 2023    Iron deficiency anemia 2023    Chronic diarrhea 2022    Age-related osteoporosis without current pathological fracture 2022    Dysthymia 2022    Early dry stage nonexudative age-related macular degeneration of right eye 2022    Monoclonal gammopathy present on serum protein electrophoresis 2022    S/P

## 2024-08-12 ENCOUNTER — TELEPHONE (OUTPATIENT)
Dept: PRIMARY CARE CLINIC | Age: 89
End: 2024-08-12

## 2024-08-12 NOTE — TELEPHONE ENCOUNTER
Pt Daughter states Pt needs a letter for her apartments stating she can not live alone due to fall risk.

## 2024-08-21 DIAGNOSIS — G62.9 PERIPHERAL POLYNEUROPATHY: ICD-10-CM

## 2024-08-21 RX ORDER — GABAPENTIN 100 MG/1
CAPSULE ORAL
Qty: 90 CAPSULE | Refills: 0 | Status: SHIPPED | OUTPATIENT
Start: 2024-08-21 | End: 2024-09-21

## 2024-08-21 NOTE — TELEPHONE ENCOUNTER
Saundra Finn called to request a refill on her medication.      Last office visit : 1/24/2024   Next office visit : 1/27/2025     Last UDS:   Benzodiazepine Screen, Urine   Date Value Ref Range Status   01/24/2024 n  Final     Buprenorphine Urine   Date Value Ref Range Status   01/24/2024 n  Final     Cocaine Metabolite Screen, Urine   Date Value Ref Range Status   01/24/2024 n  Final     Gabapentin Screen, Urine   Date Value Ref Range Status   01/24/2024 n  Final     Oxycodone Screen, Ur   Date Value Ref Range Status   01/24/2024 n  Final     Propoxyphene Screen, Urine   Date Value Ref Range Status   01/24/2024 n  Final     THC Screen, Urine   Date Value Ref Range Status   01/24/2024 n  Final     Tricyclic Antidepressants, Urine   Date Value Ref Range Status   01/24/2024 n  Final         Medication Contract: 1/24/24   Last Fill: 7/23/2024     Requested Prescriptions     Pending Prescriptions Disp Refills    gabapentin (NEURONTIN) 100 MG capsule [Pharmacy Med Name: GABAPENTIN 100MG CAPSULES] 90 capsule      Sig: TAKE 1 CAPSULE BY MOUTH EVERY MORNING AND TAKE 2 CAPSULE BY MOUTH EVERY EVENING FOR NEUROPATHY IN FEET         Please approve or refuse this medication.   Crista Arreola, SERG

## 2024-09-03 ENCOUNTER — OFFICE VISIT (OUTPATIENT)
Dept: PRIMARY CARE CLINIC | Age: 89
End: 2024-09-03
Payer: MEDICARE

## 2024-09-03 VITALS
TEMPERATURE: 96.9 F | WEIGHT: 110.4 LBS | HEIGHT: 58 IN | BODY MASS INDEX: 23.18 KG/M2 | DIASTOLIC BLOOD PRESSURE: 78 MMHG | RESPIRATION RATE: 18 BRPM | SYSTOLIC BLOOD PRESSURE: 134 MMHG | OXYGEN SATURATION: 90 % | HEART RATE: 75 BPM

## 2024-09-03 DIAGNOSIS — E11.9 TYPE 2 DIABETES MELLITUS WITHOUT COMPLICATION, WITHOUT LONG-TERM CURRENT USE OF INSULIN (HCC): ICD-10-CM

## 2024-09-03 DIAGNOSIS — I10 ESSENTIAL HYPERTENSION: ICD-10-CM

## 2024-09-03 DIAGNOSIS — G62.9 NEUROPATHY: Primary | ICD-10-CM

## 2024-09-03 PROBLEM — N18.30 CHRONIC RENAL DISEASE, STAGE III (HCC): Status: ACTIVE | Noted: 2021-11-14

## 2024-09-03 LAB
ANION GAP SERPL CALCULATED.3IONS-SCNC: 10 MMOL/L (ref 7–19)
BUN SERPL-MCNC: 16 MG/DL (ref 8–23)
CALCIUM SERPL-MCNC: 9.9 MG/DL (ref 8.8–10.2)
CHLORIDE SERPL-SCNC: 103 MMOL/L (ref 98–111)
CO2 SERPL-SCNC: 26 MMOL/L (ref 22–29)
CREAT SERPL-MCNC: 0.9 MG/DL (ref 0.5–0.9)
GLUCOSE SERPL-MCNC: 112 MG/DL (ref 70–99)
HBA1C MFR BLD: 5.6 % (ref 4–5.6)
POTASSIUM SERPL-SCNC: 4.3 MMOL/L (ref 3.5–5)
SODIUM SERPL-SCNC: 139 MMOL/L (ref 136–145)

## 2024-09-03 PROCEDURE — 3044F HG A1C LEVEL LT 7.0%: CPT | Performed by: FAMILY MEDICINE

## 2024-09-03 PROCEDURE — G8420 CALC BMI NORM PARAMETERS: HCPCS | Performed by: FAMILY MEDICINE

## 2024-09-03 PROCEDURE — 1090F PRES/ABSN URINE INCON ASSESS: CPT | Performed by: FAMILY MEDICINE

## 2024-09-03 PROCEDURE — 1036F TOBACCO NON-USER: CPT | Performed by: FAMILY MEDICINE

## 2024-09-03 PROCEDURE — 99213 OFFICE O/P EST LOW 20 MIN: CPT | Performed by: FAMILY MEDICINE

## 2024-09-03 PROCEDURE — G8427 DOCREV CUR MEDS BY ELIG CLIN: HCPCS | Performed by: FAMILY MEDICINE

## 2024-09-03 PROCEDURE — 1123F ACP DISCUSS/DSCN MKR DOCD: CPT | Performed by: FAMILY MEDICINE

## 2024-09-03 RX ORDER — GABAPENTIN 100 MG/1
CAPSULE ORAL
Qty: 150 CAPSULE | Refills: 2 | Status: SHIPPED | OUTPATIENT
Start: 2024-09-03 | End: 2024-11-03

## 2024-09-03 ASSESSMENT — ENCOUNTER SYMPTOMS
RESPIRATORY NEGATIVE: 1
GASTROINTESTINAL NEGATIVE: 1

## 2024-09-03 NOTE — PATIENT INSTRUCTIONS
Please change her gabapentin to 100 mg tablets-take 2 tablets in the morning, 1 at lunch and 2 in the evening for your nerve pain.      I have sent in a new prescription with more pills                We are committed to providing you with the best care possible.   In order to help us achieve these goals please remember to bring all medications, herbal products, and over the counter supplements with you to each visit.     If your provider has ordered testing for you, please be sure to follow up with our office if you have not received results within 7 days after the testing took place.     *If you receive a survey after visiting one of our offices, please take time to share your experience concerning your physician office visit. These surveys are confidential and no health information about you is shared.  We are eager to improve for you and we are counting on your feedback to help make that happen.         Wt Readings from Last 3 Encounters:   09/03/24 50.1 kg (110 lb 6.4 oz)   08/07/24 50.8 kg (112 lb)   06/05/24 53.8 kg (118 lb 11.2 oz)

## 2024-09-04 NOTE — PROGRESS NOTES
Minutes of Exercise per Session: 0 min   Stress: No Stress Concern Present (11/3/2020)    [employer] - Occupational Stress Questionnaire     Feeling of Stress : Only a little    [facility] from [facility], [facility]    Family and Community Support    Received from [facility], [facility]    Abuse Screen   Housing Stability: Unknown (1/24/2024)    Housing Stability Vital Sign     Unstable Housing in the Last Year: No       Review of Systems   Constitutional:  Positive for activity change and fatigue. Negative for unexpected weight change.   Eyes:  Negative for visual disturbance.   Respiratory: Negative.     Cardiovascular: Negative.    Gastrointestinal: Negative.    Endocrine: Negative for polydipsia, polyphagia and polyuria.   Genitourinary:  Negative for dysuria.   Musculoskeletal: Negative.    Neurological:  Negative for light-headedness and numbness.   Hematological: Negative.    Psychiatric/Behavioral: Negative.           Current Outpatient Medications on File Prior to Visit   Medication Sig Dispense Refill    dorzolamide-timolol (COSOPT) 2-0.5 % ophthalmic solution Place 2 drops into both eyes      aspirin 325 MG tablet Take 1 tablet by mouth as needed for Pain      ELIQUIS 2.5 MG TABS tablet TAKE 1 TABLET BY MOUTH TWICE DAILY 60 tablet 11    omeprazole (PRILOSEC) 40 MG delayed release capsule TAKE 1 CAPSULE BY MOUTH DAILY FOR STOMACH 90 capsule 3    sertraline (ZOLOFT) 25 MG tablet Take 1 tablet by mouth daily 90 tablet 1    furosemide (LASIX) 40 MG tablet TAKE 1 TABLET BY MOUTH EVERY DAY AS NEEDED FOR LEG SWELLING      Naproxen Sodium 220 MG CAPS Take by mouth as needed      Cholecalciferol (VITAMIN D3) 50 MCG (2000 UT) CAPS Take by mouth      Calcium Lactate 100 MG TABS Take by mouth      Calcium Carbonate-Vitamin D (OYSTER SHELL CALCIUM/D) 500-200 MG-UNIT TABS Take 1 tablet by mouth daily 90 tablet 3    Probiotic

## 2024-09-24 RX ORDER — ATORVASTATIN CALCIUM 80 MG/1
80 TABLET, FILM COATED ORAL NIGHTLY
Qty: 30 TABLET | Refills: 5 | Status: SHIPPED | OUTPATIENT
Start: 2024-09-24 | End: 2025-09-25

## 2024-09-24 RX ORDER — ATORVASTATIN CALCIUM 80 MG/1
80 TABLET, FILM COATED ORAL NIGHTLY
Qty: 90 TABLET | Refills: 3 | Status: CANCELLED | OUTPATIENT
Start: 2024-09-24 | End: 2025-09-25

## 2024-09-24 RX ORDER — ATORVASTATIN CALCIUM 80 MG/1
80 TABLET, FILM COATED ORAL NIGHTLY
Qty: 30 TABLET | COMMUNITY
Start: 2024-09-24 | End: 2025-09-24

## 2024-10-07 ENCOUNTER — TELEPHONE (OUTPATIENT)
Dept: PRIMARY CARE CLINIC | Age: 89
End: 2024-10-07

## 2024-10-07 ENCOUNTER — OFFICE VISIT (OUTPATIENT)
Dept: PRIMARY CARE CLINIC | Age: 89
End: 2024-10-07
Payer: MEDICARE

## 2024-10-07 VITALS
RESPIRATION RATE: 18 BRPM | DIASTOLIC BLOOD PRESSURE: 60 MMHG | BODY MASS INDEX: 20.56 KG/M2 | WEIGHT: 102 LBS | TEMPERATURE: 96.7 F | SYSTOLIC BLOOD PRESSURE: 118 MMHG | HEIGHT: 59 IN

## 2024-10-07 DIAGNOSIS — G62.9 PERIPHERAL POLYNEUROPATHY: ICD-10-CM

## 2024-10-07 DIAGNOSIS — D47.2 MGUS (MONOCLONAL GAMMOPATHY OF UNKNOWN SIGNIFICANCE): ICD-10-CM

## 2024-10-07 DIAGNOSIS — R29.6 FALLS: Primary | ICD-10-CM

## 2024-10-07 DIAGNOSIS — K62.5 RECTAL BLEEDING: ICD-10-CM

## 2024-10-07 DIAGNOSIS — L89.301 PRESSURE INJURY OF BUTTOCK, STAGE 1, UNSPECIFIED LATERALITY: ICD-10-CM

## 2024-10-07 DIAGNOSIS — R53.1 WEAKNESS: ICD-10-CM

## 2024-10-07 DIAGNOSIS — R11.0 NAUSEA: ICD-10-CM

## 2024-10-07 DIAGNOSIS — R63.4 WEIGHT LOSS: ICD-10-CM

## 2024-10-07 DIAGNOSIS — I48.0 PAROXYSMAL ATRIAL FIBRILLATION (HCC): ICD-10-CM

## 2024-10-07 DIAGNOSIS — R19.7 ACUTE DIARRHEA: ICD-10-CM

## 2024-10-07 PROBLEM — Z85.3 HX OF BREAST CANCER: Status: ACTIVE | Noted: 2022-07-11

## 2024-10-07 LAB
ALBUMIN SERPL-MCNC: 3.7 G/DL (ref 3.5–5.2)
ALP SERPL-CCNC: 69 U/L (ref 35–104)
ALT SERPL-CCNC: 7 U/L (ref 5–33)
ANION GAP SERPL CALCULATED.3IONS-SCNC: 15 MMOL/L (ref 7–19)
AST SERPL-CCNC: 14 U/L (ref 5–32)
BASOPHILS # BLD: 0 K/UL (ref 0–0.2)
BASOPHILS NFR BLD: 0.3 % (ref 0–1)
BILIRUB SERPL-MCNC: 0.5 MG/DL (ref 0.2–1.2)
BUN SERPL-MCNC: 27 MG/DL (ref 8–23)
CALCIUM SERPL-MCNC: 9.9 MG/DL (ref 8.8–10.2)
CHLORIDE SERPL-SCNC: 106 MMOL/L (ref 98–111)
CO2 SERPL-SCNC: 22 MMOL/L (ref 22–29)
CREAT SERPL-MCNC: 1.3 MG/DL (ref 0.5–0.9)
EOSINOPHIL # BLD: 0 K/UL (ref 0–0.6)
EOSINOPHIL NFR BLD: 0.2 % (ref 0–5)
ERYTHROCYTE [DISTWIDTH] IN BLOOD BY AUTOMATED COUNT: 12.7 % (ref 11.5–14.5)
FERRITIN SERPL-MCNC: 109.9 NG/ML (ref 13–150)
GLUCOSE SERPL-MCNC: 132 MG/DL (ref 70–99)
HCT VFR BLD AUTO: 40 % (ref 37–47)
HEMOCCULT STL QL: ABNORMAL
HEMOCCULT STL QL: ABNORMAL
HEMOCCULT STL QL: POSITIVE
HGB BLD-MCNC: 13.2 G/DL (ref 12–16)
IMM GRANULOCYTES # BLD: 0.1 K/UL
LYMPHOCYTES # BLD: 1.5 K/UL (ref 1.1–4.5)
LYMPHOCYTES NFR BLD: 11.2 % (ref 20–40)
MCH RBC QN AUTO: 30.8 PG (ref 27–31)
MCHC RBC AUTO-ENTMCNC: 33 G/DL (ref 33–37)
MCV RBC AUTO: 93.2 FL (ref 81–99)
MONOCYTES # BLD: 0.7 K/UL (ref 0–0.9)
MONOCYTES NFR BLD: 5.3 % (ref 0–10)
NEUTROPHILS # BLD: 11 K/UL (ref 1.5–7.5)
NEUTS SEG NFR BLD: 82.5 % (ref 50–65)
PLATELET # BLD AUTO: 261 K/UL (ref 130–400)
PMV BLD AUTO: 10 FL (ref 9.4–12.3)
POTASSIUM SERPL-SCNC: 2.2 MMOL/L (ref 3.5–5)
PROT SERPL-MCNC: 7.2 G/DL (ref 6.4–8.3)
RBC # BLD AUTO: 4.29 M/UL (ref 4.2–5.4)
SODIUM SERPL-SCNC: 143 MMOL/L (ref 136–145)
TSH SERPL DL<=0.005 MIU/L-ACNC: 3.58 UIU/ML (ref 0.27–4.2)
WBC # BLD AUTO: 13.3 K/UL (ref 4.8–10.8)

## 2024-10-07 PROCEDURE — 82270 OCCULT BLOOD FECES: CPT | Performed by: FAMILY MEDICINE

## 2024-10-07 PROCEDURE — 99215 OFFICE O/P EST HI 40 MIN: CPT | Performed by: FAMILY MEDICINE

## 2024-10-07 PROCEDURE — G8427 DOCREV CUR MEDS BY ELIG CLIN: HCPCS | Performed by: FAMILY MEDICINE

## 2024-10-07 PROCEDURE — G8484 FLU IMMUNIZE NO ADMIN: HCPCS | Performed by: FAMILY MEDICINE

## 2024-10-07 PROCEDURE — 1123F ACP DISCUSS/DSCN MKR DOCD: CPT | Performed by: FAMILY MEDICINE

## 2024-10-07 PROCEDURE — 1090F PRES/ABSN URINE INCON ASSESS: CPT | Performed by: FAMILY MEDICINE

## 2024-10-07 PROCEDURE — 1036F TOBACCO NON-USER: CPT | Performed by: FAMILY MEDICINE

## 2024-10-07 PROCEDURE — G8420 CALC BMI NORM PARAMETERS: HCPCS | Performed by: FAMILY MEDICINE

## 2024-10-07 RX ORDER — POTASSIUM CHLORIDE 1500 MG/1
40 TABLET, EXTENDED RELEASE ORAL DAILY
Qty: 60 TABLET | Refills: 0 | Status: SHIPPED | OUTPATIENT
Start: 2024-10-07 | End: 2024-11-06

## 2024-10-07 NOTE — PATIENT INSTRUCTIONS
Wt Readings from Last 3 Encounters:   10/07/24 46.3 kg (102 lb)   09/03/24 50.1 kg (110 lb 6.4 oz)   08/07/24 50.8 kg (112 lb)    We are committed to providing you with the best care possible.   In order to help us achieve these goals please remember to bring all medications, herbal products, and over the counter supplements with you to each visit.     If your provider has ordered testing for you, please be sure to follow up with our office if you have not received results within 7 days after the testing took place.     *If you receive a survey after visiting one of our offices, please take time to share your experience concerning your physician office visit. These surveys are confidential and no health information about you is shared.  We are eager to improve for you and we are counting on your feedback to help make that happen.    Call placed to [facility]   Spoke with Nate (Pharmacist)  Nate confirmed that medication sig was clarified by station  this morning   Nate confirmed appropriate Metoprolol dose and sig     Vasile Caruso RN

## 2024-10-07 NOTE — TELEPHONE ENCOUNTER
Mrs. Finn was in the office today with her daughter-in-law Laurita and Sister Yue.  She said that both of them could be called regarding her lab work.  We called her daughter Berenice and left a message and we called her daughter-in-law Laurita who was also on her privacy form because we need to get a hold of Mrs. Finn and she does not have a phone number in the chart.  Her potassium was low at 2.2.  They did not tell me she was on potassium 40 mEq Monday Wednesday and Friday.  That was not on her med list.  That is what her daughter said she is on.  If that is the case I need her to take it every single day because her potassium is low.  We have left messages for Berenice and Laurita but no one has called back yet.

## 2024-10-08 ENCOUNTER — TELEPHONE (OUTPATIENT)
Dept: PRIMARY CARE CLINIC | Age: 89
End: 2024-10-08

## 2024-10-08 DIAGNOSIS — E87.6 LOW BLOOD POTASSIUM: Primary | ICD-10-CM

## 2024-10-08 DIAGNOSIS — R19.5 HEME POSITIVE STOOL: ICD-10-CM

## 2024-10-08 DIAGNOSIS — D64.9 ANEMIA, UNSPECIFIED TYPE: Primary | ICD-10-CM

## 2024-10-08 NOTE — TELEPHONE ENCOUNTER
I spoke with Laurita on the change in her potassium and she verbalized her understanding for Saundra to take it only once daily and re check labs on Friday

## 2024-10-08 NOTE — TELEPHONE ENCOUNTER
----- Message from Dr. Margarita Jerome MD sent at 10/8/2024  1:03 PM CDT -----  Were you able to get a hold of the daughter and tell her just to take the potassium 20 mEq 1 tablet once a day and recheck the potassium level on Friday morning?  This is the 1 where I had sent the prescription in as 20 mEq 2 tablets once a day because I thought she was taking 40 mg 3 times a week but that was the Lasix.

## 2024-10-09 RX ORDER — SERTRALINE HYDROCHLORIDE 25 MG/1
25 TABLET, FILM COATED ORAL DAILY
Qty: 90 TABLET | Refills: 1 | Status: SHIPPED | OUTPATIENT
Start: 2024-10-09

## 2024-10-09 ASSESSMENT — ENCOUNTER SYMPTOMS
NAUSEA: 1
RESPIRATORY NEGATIVE: 1
DIARRHEA: 1
BLOOD IN STOOL: 1

## 2024-10-09 NOTE — PROGRESS NOTES
SUBJECTIVE:    Saundra Finn is 88 y.o.female who comes in complaining of GI Problem (Pt advised diarrhea  multiple times a day, unable to eat 2-3 weeks. Family advised weakness and falls )   .       HPI: Mrs. Finn comes in today accompanied by her Sister Yue who is a nurse and manages a lot of her medications and her daughter-in-law Laurita.  She is currently living with her son.  She is becoming increasingly weak, frail and is having falling.  She sits most of the day in a chair.  She is also complaining of diarrhea.  She has had loose bowel movements for several days and she says it has changed.  It is very dark and sticky.  She is on iron for anemia.  She has been on Eliquis in the past for atrial fibrillation but she has not been taking it because of the falls.  She has had nausea and does not want to eat.  Her family is very concerned about her.  Review of her chart reveals that she also has MGUS and is followed by [facility] hematology but she has not seen them since June.  They are also requesting a lightweight transport wheelchair.  She has a rollator but it is very difficult to push her in it.      Allergies   Allergen Reactions    Penicillins      Just does not work    Sulfa Antibiotics Rash       Social History     Socioeconomic History    Marital status:      Spouse name: None    Number of children: 4    Years of education: None    Highest education level: None   Occupational History    Occupation: retired   Tobacco Use    Smoking status: Never    Smokeless tobacco: Never   Vaping Use    Vaping status: Never Used   Substance and Sexual Activity    Alcohol use: No    Drug use: No    Sexual activity: Defer     Partners: Male     Birth control/protection: Post-menopausal     Comment:    Social History Narrative    3/24:    Pt lives with her daughter in [place]. Has a small dog.     Another adult child - a son - now living in same home as pt and he is supportive, assistive to patient.    Continues to

## 2024-10-11 DIAGNOSIS — E87.6 LOW BLOOD POTASSIUM: ICD-10-CM

## 2024-10-11 LAB
ANION GAP SERPL CALCULATED.3IONS-SCNC: 14 MMOL/L (ref 7–19)
BUN SERPL-MCNC: 26 MG/DL (ref 8–23)
CALCIUM SERPL-MCNC: 10.2 MG/DL (ref 8.8–10.2)
CHLORIDE SERPL-SCNC: 103 MMOL/L (ref 98–111)
CO2 SERPL-SCNC: 27 MMOL/L (ref 22–29)
CREAT SERPL-MCNC: 1.1 MG/DL (ref 0.5–0.9)
GLUCOSE SERPL-MCNC: 130 MG/DL (ref 70–99)
POTASSIUM SERPL-SCNC: 3 MMOL/L (ref 3.5–5)
SODIUM SERPL-SCNC: 144 MMOL/L (ref 136–145)

## 2024-10-13 DIAGNOSIS — E87.6 HYPOKALEMIA: Primary | ICD-10-CM

## 2024-10-15 ENCOUNTER — OFFICE VISIT (OUTPATIENT)
Dept: PALLATIVE CARE | Age: 89
End: 2024-10-15
Payer: MEDICARE

## 2024-10-15 DIAGNOSIS — Z74.1 REQUIRES ASSISTANCE WITH ACTIVITIES OF DAILY LIVING (ADL): ICD-10-CM

## 2024-10-15 DIAGNOSIS — R07.81 RIB PAIN ON RIGHT SIDE: ICD-10-CM

## 2024-10-15 DIAGNOSIS — R29.6 FREQUENT FALLS: Primary | ICD-10-CM

## 2024-10-15 DIAGNOSIS — Z51.5 ENCOUNTER FOR PALLIATIVE CARE: ICD-10-CM

## 2024-10-15 PROCEDURE — G8420 CALC BMI NORM PARAMETERS: HCPCS

## 2024-10-15 PROCEDURE — G8484 FLU IMMUNIZE NO ADMIN: HCPCS

## 2024-10-15 PROCEDURE — 1036F TOBACCO NON-USER: CPT

## 2024-10-15 PROCEDURE — 1090F PRES/ABSN URINE INCON ASSESS: CPT

## 2024-10-15 PROCEDURE — 99350 HOME/RES VST EST HIGH MDM 60: CPT

## 2024-10-15 PROCEDURE — 1123F ACP DISCUSS/DSCN MKR DOCD: CPT

## 2024-10-15 NOTE — PROGRESS NOTES
Supportive Care/Community Based Palliative Care  Initial consultation note        Patient Name:  Saundra Finn  Medical Record Number:  [id number]  YOB: 1935    Date of Visit: 10/15/2024  Location of Visit:  Home    Patient Care Team:  Margarita Jerome MD as PCP - General  Margarita Jerome MD as P[facility]  Gabino Duke MD as Consulting Physician (Cardiology)  Ayesha Sutherland MD (Neurology)  Naga Herron APRN - CNP as Advanced Practice Nurse (Nurse Practitioner Family)    History obtained from:  Patient, EMR, son Filipe    PALLIATIVE DIAGNOSES AND ORDERS/RECOMMENDATIONS/PLAN:   1. Frequent falls  Encourage use of assistive devices  Patient ambulates with rollator    2. Rib pain on right side  Related to fall  May take Tylenol as needed    3. Requires assistance with activities of daily living (ADL)  Refer to social work to maximize community resources  Patient receiving bath aide  Patient's adult children are all highly involved in her care to some degree    4. Encounter for palliative care  Current goals of care include: Preserve independence/control/autonomy, Maintain or improve functional status, Maintain or improve quality of life, Focus on comfort and quality of life, Would want hospitalization if needed    Code status confirmed: DNRCC  Encouraged completion of ACP documents. Supportive Care  is available to assist if patient desires  Will continue goals of care discussions based on clinical course  Follow up home visit in 3-4 weeks and as needed    CHIEF COMPLAINT:     Chief Complaint   Patient presents with    Fall     Frequent falls, generalized weakness       CLINICAL SUMMARY:          Saundra Finn is a 89 y.o. female with PMH of paroxysmal atrial fibrillation, MGUS, peripheral neuropathy secondary to type 2 diabetes, stage III CKD, history of prosthetic aortic valve replacement, TIA, and other comorbidities.    HPI AND VISIT SUMMARY:   I saw Saundra

## 2024-10-22 RX ORDER — ATORVASTATIN CALCIUM 80 MG/1
80 TABLET, FILM COATED ORAL NIGHTLY
Qty: 90 TABLET | Refills: 1 | Status: ON HOLD | OUTPATIENT
Start: 2024-10-22 | End: 2025-10-22

## 2024-10-24 ENCOUNTER — SOCIAL WORK (OUTPATIENT)
Dept: PALLATIVE CARE | Age: 89
End: 2024-10-24

## 2024-10-24 NOTE — PROGRESS NOTES
Met with Saundra Finn and her son Juan Antonio, dtr in law Palm, sister Delicia, brother Ward, son Filipe in her apartment home that she shares with Filipe.  Filipe is on disability and lives with her and helps with her needs at home.  Saundra is pleased with the care he provides.  She receives services 2 days per week from the [place] [facility] program and assist with personal care. Her family takes her to medical appts and runs errands for her.  She no longer drives.  She has been a  for 19 years.  She has 4 children - El ([facility]), Juan Antonio ([place]), Berenice ([place]), and Filipe.  They are all supportive and participate in her needs. El has some health issues that keeps him from being as helpful as the others.  She receives the Medicare Savings Program that pays her Medicare premium (her income is approx $1600/month).  She receives $16 per month, $75 for over the counter from [employer] and $500 for medical/dental from [employer].  [facility] and sent Laurita some info to review..  We discussed ACP at length. She elected DNR (which Naga BAJWA had informed me of.  She signed EMS DNR and I notarized.  We completed LW/HCS and she named her sister Delicia and dtr in law palm as healthcare surrogates.  She is reviewing DPOA ppw and I will follow up in 2 weeks to discuss.

## 2024-10-25 ENCOUNTER — APPOINTMENT (OUTPATIENT)
Dept: GENERAL RADIOLOGY | Age: 89
DRG: 064 | End: 2024-10-25
Payer: MEDICARE

## 2024-10-25 ENCOUNTER — APPOINTMENT (OUTPATIENT)
Dept: CT IMAGING | Age: 89
DRG: 064 | End: 2024-10-25
Payer: MEDICARE

## 2024-10-25 ENCOUNTER — APPOINTMENT (OUTPATIENT)
Age: 89
DRG: 064 | End: 2024-10-25
Attending: PSYCHIATRY & NEUROLOGY
Payer: MEDICARE

## 2024-10-25 ENCOUNTER — HOSPITAL ENCOUNTER (INPATIENT)
Age: 89
LOS: 6 days | Discharge: NURSING, REHAB OR LONG-TERM CARE | DRG: 064 | End: 2024-10-31
Attending: EMERGENCY MEDICINE | Admitting: STUDENT IN AN ORGANIZED HEALTH CARE EDUCATION/TRAINING PROGRAM
Payer: MEDICARE

## 2024-10-25 DIAGNOSIS — I63.9 CEREBROVASCULAR ACCIDENT (CVA), UNSPECIFIED MECHANISM (HCC): Primary | ICD-10-CM

## 2024-10-25 DIAGNOSIS — Z51.5 PALLIATIVE CARE PATIENT: ICD-10-CM

## 2024-10-25 DIAGNOSIS — R53.1 ACUTE RIGHT-SIDED WEAKNESS: ICD-10-CM

## 2024-10-25 DIAGNOSIS — G62.9 NEUROPATHY: ICD-10-CM

## 2024-10-25 DIAGNOSIS — I66.9 CEREBRAL ARTERY OCCLUSION: ICD-10-CM

## 2024-10-25 DIAGNOSIS — I63.9 ACUTE CVA (CEREBROVASCULAR ACCIDENT) (HCC): ICD-10-CM

## 2024-10-25 PROBLEM — N39.0 UTI (URINARY TRACT INFECTION): Status: ACTIVE | Noted: 2024-10-25

## 2024-10-25 PROBLEM — R29.90 STROKE-LIKE SYMPTOM: Status: ACTIVE | Noted: 2024-10-25

## 2024-10-25 LAB
ALBUMIN SERPL-MCNC: 3.6 G/DL (ref 3.5–5.2)
ALP SERPL-CCNC: 63 U/L (ref 35–104)
ALT SERPL-CCNC: 6 U/L (ref 5–33)
ANION GAP SERPL CALCULATED.3IONS-SCNC: 14 MMOL/L (ref 7–19)
AST SERPL-CCNC: 17 U/L (ref 5–32)
BACTERIA URNS QL MICRO: ABNORMAL /HPF
BASOPHILS # BLD: 0 K/UL (ref 0–0.2)
BASOPHILS NFR BLD: 0.3 % (ref 0–1)
BILIRUB SERPL-MCNC: 0.5 MG/DL (ref 0.2–1.2)
BILIRUB UR QL STRIP: NEGATIVE
BUN SERPL-MCNC: 15 MG/DL (ref 8–23)
CALCIUM SERPL-MCNC: 9.3 MG/DL (ref 8.8–10.2)
CHLORIDE SERPL-SCNC: 103 MMOL/L (ref 98–111)
CLARITY UR: ABNORMAL
CO2 SERPL-SCNC: 23 MMOL/L (ref 22–29)
COLOR UR: YELLOW
CREAT SERPL-MCNC: 0.9 MG/DL (ref 0.5–0.9)
CRYSTALS URNS MICRO: ABNORMAL /HPF
EOSINOPHIL # BLD: 0 K/UL (ref 0–0.6)
EOSINOPHIL NFR BLD: 0.2 % (ref 0–5)
EPI CELLS #/AREA URNS AUTO: 0 /HPF (ref 0–5)
ERYTHROCYTE [DISTWIDTH] IN BLOOD BY AUTOMATED COUNT: 12.8 % (ref 11.5–14.5)
GLUCOSE BLD-MCNC: 112 MG/DL (ref 70–99)
GLUCOSE BLD-MCNC: 118 MG/DL (ref 70–99)
GLUCOSE BLD-MCNC: 123 MG/DL (ref 70–99)
GLUCOSE BLD-MCNC: 139 MG/DL (ref 70–99)
GLUCOSE SERPL-MCNC: 142 MG/DL (ref 70–99)
GLUCOSE UR STRIP.AUTO-MCNC: NEGATIVE MG/DL
HCT VFR BLD AUTO: 40.3 % (ref 37–47)
HGB BLD-MCNC: 12.5 G/DL (ref 12–16)
HGB UR STRIP.AUTO-MCNC: ABNORMAL MG/L
HYALINE CASTS #/AREA URNS AUTO: 2 /HPF (ref 0–8)
IMM GRANULOCYTES # BLD: 0 K/UL
INR PPP: 0.99 (ref 0.88–1.18)
KETONES UR STRIP.AUTO-MCNC: NEGATIVE MG/DL
LEUKOCYTE ESTERASE UR QL STRIP.AUTO: ABNORMAL
LYMPHOCYTES # BLD: 1.4 K/UL (ref 1.1–4.5)
LYMPHOCYTES NFR BLD: 15.8 % (ref 20–40)
MCH RBC QN AUTO: 31.2 PG (ref 27–31)
MCHC RBC AUTO-ENTMCNC: 31 G/DL (ref 33–37)
MCV RBC AUTO: 100.5 FL (ref 81–99)
MONOCYTES # BLD: 0.3 K/UL (ref 0–0.9)
MONOCYTES NFR BLD: 3.3 % (ref 0–10)
NEUTROPHILS # BLD: 7.1 K/UL (ref 1.5–7.5)
NEUTS SEG NFR BLD: 80.1 % (ref 50–65)
NITRITE UR QL STRIP.AUTO: NEGATIVE
PERFORMED ON: ABNORMAL
PH UR STRIP.AUTO: 6 [PH] (ref 5–8)
PLATELET # BLD AUTO: 248 K/UL (ref 130–400)
PMV BLD AUTO: 10 FL (ref 9.4–12.3)
POTASSIUM SERPL-SCNC: 4.6 MMOL/L (ref 3.5–5)
PROT SERPL-MCNC: 7.1 G/DL (ref 6.4–8.3)
PROT UR STRIP.AUTO-MCNC: ABNORMAL MG/DL
PROTHROMBIN TIME: 12.8 SEC (ref 12–14.6)
RBC # BLD AUTO: 4.01 M/UL (ref 4.2–5.4)
RBC #/AREA URNS AUTO: 5 /HPF (ref 0–4)
SODIUM SERPL-SCNC: 140 MMOL/L (ref 136–145)
SP GR UR STRIP.AUTO: 1.01 (ref 1–1.03)
TROPONIN, HIGH SENSITIVITY: 31 NG/L (ref 0–14)
UROBILINOGEN UR STRIP.AUTO-MCNC: 0.2 E.U./DL
WBC # BLD AUTO: 8.8 K/UL (ref 4.8–10.8)
WBC #/AREA URNS AUTO: 634 /HPF (ref 0–5)

## 2024-10-25 PROCEDURE — 6360000002 HC RX W HCPCS: Performed by: NURSE PRACTITIONER

## 2024-10-25 PROCEDURE — 85025 COMPLETE CBC W/AUTO DIFF WBC: CPT

## 2024-10-25 PROCEDURE — 93005 ELECTROCARDIOGRAM TRACING: CPT

## 2024-10-25 PROCEDURE — 99223 1ST HOSP IP/OBS HIGH 75: CPT | Performed by: PSYCHIATRY & NEUROLOGY

## 2024-10-25 PROCEDURE — 71045 X-RAY EXAM CHEST 1 VIEW: CPT

## 2024-10-25 PROCEDURE — 0042T CT BRAIN PERFUSION: CPT

## 2024-10-25 PROCEDURE — 4A03X5D MEASUREMENT OF ARTERIAL FLOW, INTRACRANIAL, EXTERNAL APPROACH: ICD-10-PCS | Performed by: STUDENT IN AN ORGANIZED HEALTH CARE EDUCATION/TRAINING PROGRAM

## 2024-10-25 PROCEDURE — 6360000004 HC RX CONTRAST MEDICATION: Performed by: PSYCHIATRY & NEUROLOGY

## 2024-10-25 PROCEDURE — 2580000003 HC RX 258: Performed by: NURSE PRACTITIONER

## 2024-10-25 PROCEDURE — 82962 GLUCOSE BLOOD TEST: CPT

## 2024-10-25 PROCEDURE — 85610 PROTHROMBIN TIME: CPT

## 2024-10-25 PROCEDURE — 70496 CT ANGIOGRAPHY HEAD: CPT

## 2024-10-25 PROCEDURE — 70450 CT HEAD/BRAIN W/O DYE: CPT

## 2024-10-25 PROCEDURE — 6360000004 HC RX CONTRAST MEDICATION: Performed by: EMERGENCY MEDICINE

## 2024-10-25 PROCEDURE — 87186 SC STD MICRODIL/AGAR DIL: CPT

## 2024-10-25 PROCEDURE — 99285 EMERGENCY DEPT VISIT HI MDM: CPT

## 2024-10-25 PROCEDURE — 87077 CULTURE AEROBIC IDENTIFY: CPT

## 2024-10-25 PROCEDURE — 1200000000 HC SEMI PRIVATE

## 2024-10-25 PROCEDURE — 81001 URINALYSIS AUTO W/SCOPE: CPT

## 2024-10-25 PROCEDURE — 87086 URINE CULTURE/COLONY COUNT: CPT

## 2024-10-25 PROCEDURE — 84484 ASSAY OF TROPONIN QUANT: CPT

## 2024-10-25 PROCEDURE — 36415 COLL VENOUS BLD VENIPUNCTURE: CPT

## 2024-10-25 PROCEDURE — 2580000003 HC RX 258: Performed by: PSYCHIATRY & NEUROLOGY

## 2024-10-25 PROCEDURE — 80053 COMPREHEN METABOLIC PANEL: CPT

## 2024-10-25 PROCEDURE — C8929 TTE W OR WO FOL WCON,DOPPLER: HCPCS

## 2024-10-25 RX ORDER — SODIUM CHLORIDE 9 MG/ML
INJECTION, SOLUTION INTRAVENOUS PRN
Status: DISCONTINUED | OUTPATIENT
Start: 2024-10-25 | End: 2024-10-31 | Stop reason: HOSPADM

## 2024-10-25 RX ORDER — SODIUM CHLORIDE 0.9 % (FLUSH) 0.9 %
5-40 SYRINGE (ML) INJECTION PRN
Status: DISCONTINUED | OUTPATIENT
Start: 2024-10-25 | End: 2024-10-31 | Stop reason: HOSPADM

## 2024-10-25 RX ORDER — SODIUM CHLORIDE 0.9 % (FLUSH) 0.9 %
5-40 SYRINGE (ML) INJECTION EVERY 12 HOURS SCHEDULED
Status: DISCONTINUED | OUTPATIENT
Start: 2024-10-25 | End: 2024-10-31 | Stop reason: HOSPADM

## 2024-10-25 RX ORDER — GABAPENTIN 100 MG/1
100 CAPSULE ORAL EVERY 24 HOURS
Status: DISCONTINUED | OUTPATIENT
Start: 2024-10-26 | End: 2024-10-31 | Stop reason: HOSPADM

## 2024-10-25 RX ORDER — ASPIRIN 325 MG
325 TABLET ORAL PRN
Status: DISCONTINUED | OUTPATIENT
Start: 2024-10-25 | End: 2024-10-31 | Stop reason: HOSPADM

## 2024-10-25 RX ORDER — ACETAMINOPHEN 325 MG/1
650 TABLET ORAL EVERY 6 HOURS PRN
Status: DISCONTINUED | OUTPATIENT
Start: 2024-10-25 | End: 2024-10-31 | Stop reason: HOSPADM

## 2024-10-25 RX ORDER — PANTOPRAZOLE SODIUM 40 MG/1
40 TABLET, DELAYED RELEASE ORAL
Status: DISCONTINUED | OUTPATIENT
Start: 2024-10-26 | End: 2024-10-31 | Stop reason: HOSPADM

## 2024-10-25 RX ORDER — POTASSIUM CHLORIDE 7.45 MG/ML
10 INJECTION INTRAVENOUS PRN
Status: DISCONTINUED | OUTPATIENT
Start: 2024-10-25 | End: 2024-10-29

## 2024-10-25 RX ORDER — POLYETHYLENE GLYCOL 3350 17 G/17G
17 POWDER, FOR SOLUTION ORAL DAILY PRN
Status: DISCONTINUED | OUTPATIENT
Start: 2024-10-25 | End: 2024-10-31 | Stop reason: HOSPADM

## 2024-10-25 RX ORDER — ASPIRIN 81 MG/1
81 TABLET ORAL DAILY
Status: ON HOLD | COMMUNITY
End: 2024-10-30 | Stop reason: HOSPADM

## 2024-10-25 RX ORDER — ENOXAPARIN SODIUM 100 MG/ML
30 INJECTION SUBCUTANEOUS DAILY
Status: DISCONTINUED | OUTPATIENT
Start: 2024-10-25 | End: 2024-10-31 | Stop reason: HOSPADM

## 2024-10-25 RX ORDER — ONDANSETRON 2 MG/ML
4 INJECTION INTRAMUSCULAR; INTRAVENOUS EVERY 6 HOURS PRN
Status: DISCONTINUED | OUTPATIENT
Start: 2024-10-25 | End: 2024-10-31 | Stop reason: HOSPADM

## 2024-10-25 RX ORDER — ACETAMINOPHEN 650 MG/1
650 SUPPOSITORY RECTAL EVERY 6 HOURS PRN
Status: DISCONTINUED | OUTPATIENT
Start: 2024-10-25 | End: 2024-10-31 | Stop reason: HOSPADM

## 2024-10-25 RX ORDER — ONDANSETRON 4 MG/1
4 TABLET, ORALLY DISINTEGRATING ORAL EVERY 8 HOURS PRN
Status: DISCONTINUED | OUTPATIENT
Start: 2024-10-25 | End: 2024-10-31 | Stop reason: HOSPADM

## 2024-10-25 RX ORDER — GABAPENTIN 100 MG/1
100 CAPSULE ORAL 3 TIMES DAILY
Status: DISCONTINUED | OUTPATIENT
Start: 2024-10-25 | End: 2024-10-25

## 2024-10-25 RX ORDER — MAGNESIUM SULFATE IN WATER 40 MG/ML
2000 INJECTION, SOLUTION INTRAVENOUS PRN
Status: DISCONTINUED | OUTPATIENT
Start: 2024-10-25 | End: 2024-10-31 | Stop reason: HOSPADM

## 2024-10-25 RX ORDER — IOPAMIDOL 755 MG/ML
70 INJECTION, SOLUTION INTRAVASCULAR
Status: COMPLETED | OUTPATIENT
Start: 2024-10-25 | End: 2024-10-25

## 2024-10-25 RX ORDER — FUROSEMIDE 40 MG/1
40 TABLET ORAL
Status: DISCONTINUED | OUTPATIENT
Start: 2024-10-25 | End: 2024-10-31 | Stop reason: HOSPADM

## 2024-10-25 RX ORDER — NIACIN 500 MG/1
500 TABLET, EXTENDED RELEASE ORAL NIGHTLY
Status: DISCONTINUED | OUTPATIENT
Start: 2024-10-25 | End: 2024-10-31 | Stop reason: HOSPADM

## 2024-10-25 RX ORDER — ATORVASTATIN CALCIUM 80 MG/1
80 TABLET, FILM COATED ORAL NIGHTLY
Status: DISCONTINUED | OUTPATIENT
Start: 2024-10-25 | End: 2024-10-31 | Stop reason: HOSPADM

## 2024-10-25 RX ORDER — GABAPENTIN 100 MG/1
200 CAPSULE ORAL 2 TIMES DAILY
Status: DISCONTINUED | OUTPATIENT
Start: 2024-10-25 | End: 2024-10-31 | Stop reason: HOSPADM

## 2024-10-25 RX ORDER — FERROUS SULFATE 325(65) MG
325 TABLET ORAL
Status: ON HOLD | COMMUNITY
End: 2024-10-30

## 2024-10-25 RX ADMIN — SODIUM CHLORIDE, PRESERVATIVE FREE 10 ML: 5 INJECTION INTRAVENOUS at 22:33

## 2024-10-25 RX ADMIN — WATER 1000 MG: 1 INJECTION INTRAMUSCULAR; INTRAVENOUS; SUBCUTANEOUS at 14:38

## 2024-10-25 RX ADMIN — SODIUM CHLORIDE, PRESERVATIVE FREE 1.5 ML: 5 INJECTION INTRAVENOUS at 16:10

## 2024-10-25 RX ADMIN — IOPAMIDOL 70 ML: 755 INJECTION, SOLUTION INTRAVENOUS at 09:46

## 2024-10-25 NOTE — ED NOTES
Cancer (HCC)     breast    Chest pain     Diabetes mellitus (HCC)     non-insulin dependent    Diaphoresis     profuse    Family history of early CAD     Gastroesophageal reflux disease     H/O bicuspid aortic valve 5/19/2015    History of blood transfusion     History of phlebitis     Hx of blood clots     Hyperlipidemia     Hypertension     Moderate tricuspid regurgitation 01/12/2016    Osteoarthritis     Stroke (HCC) 09/2020    TIA (transient ischemic attack) 9/7/2020    Valvular heart disease        Assessment  Vitals: Level of Consciousness: Responds to voice (1)   Vitals:    10/25/24 1032 10/25/24 1046 10/25/24 1106 10/25/24 1136   BP: 131/65  (!) 148/69 (!) 147/83   Pulse: 80 84 94 94   Resp: 14 21 20 21   Temp:       TempSrc:       SpO2:  97%     Weight:       Height:         Predictive Model Details          43 (Caution)  Factor Value    Calculated 10/25/2024 12:00 36% Hermila coma scale 12    Deterioration Index Model 32% Age 89 years old     16% Respiratory rate 21     7% Potassium 4.6 mmol/L     5% Systolic 147     2% Pulse 94     1% WBC count 8.8 K/uL     0% Temperature 97 °F (36.1 °C)     0% Pulse oximetry 97 %     0% Sodium 140 mmol/L     0% Hematocrit 40.3 %       NPO? Yes  O2 Flow Rate: O2 Device: None (Room air)    Cardiac Rhythm:    NIH Score: NIH NIH Stroke Scale  Level of Consciousness (1a): Not alert, but arousable by minor stimulation to obey  LOC Questions (1b): Answers neither question correctly  LOC Commands (1c): Performs neither task correctly  Best Gaze (2): Normal  Visual (3): No visual loss  Facial Palsy (4): Normal symmetrical movement  Motor Arm, Left (5a): No effort against gravity, limb falls  Motor Arm, Right (5b): No effort against gravity, limb falls  Motor Leg, Left (6a): No effort against gravity, limb falls  Motor Leg, Right (6b): No effort against gravity, limb falls  Limb Ataxia (7): (!) Present in two limbs  Sensory (8): Normal  Best Language (9): No aphasia  Dysarthria  (10): Normal  Extinction and Inattention (11): No abnormality  Total: 19   Active LDA's:   Peripheral IV 10/25/24 Distal;Posterior;Right Forearm (Active)   Site Assessment Clean, dry & intact 10/25/24 0936   Line Status Blood return noted;Specimen collected;Normal saline locked 10/25/24 0936   Phlebitis Assessment No symptoms 10/25/24 0936   Infiltration Assessment 0 10/25/24 0936   Dressing Status New dressing applied 10/25/24 0936   Dressing Type Transparent 10/25/24 0936       Peripheral IV 10/25/24 Left;Posterior Forearm (Active)   Site Assessment Clean, dry & intact 10/25/24 0937     Pertinent or High Risk Medications/Drips: no   If Yes, please provide details:    Blood Product Administration: no  If Yes, please provide details:    Sepsis Risk Score      Admitted with Sepsis? No    Recommendation  Incomplete orders:    Patient Belongings: With pt  Additional Comments:    If any further questions, please call Sending RN at [phone]    Electronically signed by: Electronically signed by Latonia Nguyen RN on 10/25/2024 at 12:00 PM

## 2024-10-25 NOTE — ED NOTES
Dr Galo called a Code Stroke, called CT and spoke with someone. They are aware and ready 0943  PA 0944  Alerted Dr JOSE Delgado 0948  Alerted Stroke Coordinator 0950  LKW was \"sometime last night\"

## 2024-10-25 NOTE — ED PROVIDER NOTES
[facility] EMERGENCY DEPT  EMERGENCY DEPARTMENT ENCOUNTER      Pt Name: Saundra Finn  MRN: [id number]  Birthdate 1935  Date of evaluation: 10/25/2024  Provider: Ramon Galo Jr, MD    CHIEF COMPLAINT       Chief Complaint   Patient presents with    Altered Mental Status     Pt altered, per EMS family said LKW last night but they were unsure of time           HISTORY OF PRESENT ILLNESS   (Location/Symptom, Timing/Onset,Context/Setting, Quality, Duration, Modifying Factors, Severity)  Note limiting factors.   Saundra Finn is a 89 y.o. female who presents to the emergency department for evaluation after waking up with altered mental status this morning.  Family states she was normal throughout the day and before bed last night but woke up this morning and was difficult to arouse with garbled speech.  Patient has a history of atrial fibrillation, CKD, prosthetic aortic valve replacement, type 2 diabetes.  Family reports history of a previous stroke but does not believe she has any chronic deficits.  Has history of IVC filter.  Not on anticoagulants per medical records.    HPI    NursingNotes were reviewed.    REVIEW OF SYSTEMS    (2-9 systems for level 4, 10 or more for level 5)     Review of Systems   Unable to perform ROS: Mental status change       A complete review of systems was performed and is negative except as noted above in the HPI.       PAST MEDICAL HISTORY     Past Medical History:   Diagnosis Date    A-fib (HCC) 07/28/2006    s/p surgery     Breast cancer (HCC) 2006    Cancer (HCC)     breast    Chest pain     Diabetes mellitus (HCC)     non-insulin dependent    Diaphoresis     profuse    Family history of early CAD     Gastroesophageal reflux disease     H/O bicuspid aortic valve 5/19/2015    History of blood transfusion     History of phlebitis     Hx of blood clots     Hyperlipidemia     Hypertension     Moderate tricuspid regurgitation 01/12/2016    Osteoarthritis     Stroke (HCC) 09/2020    TIA  TO NEUROLOGY  IP CONSULT TO CASE MANAGEMENT    CRITICAL CARE TIME   Total Critical Care time was 60 minutes, excluding separately reportable procedures.  There was a high probability of clinically significant/life threatening deterioration in the patient's condition which required my urgent intervention.        FINAL IMPRESSION     1. Cerebrovascular accident (CVA), unspecified mechanism (HCC)    2. Acute right-sided weakness    3. Cerebral artery occlusion    4. Acute CVA (cerebrovascular accident) (HCC)          DISPOSITION/PLAN   DISPOSITION Admitted 10/25/2024 11:13:16 AM           No notes of EC Admission Criteria type on file.    PATIENT REFERRED TO:  No follow-up provider specified.    DISCHARGE MEDICATIONS:  New Prescriptions    No medications on file          (Please note that portions of this note were completed with a voice recognition program.  Efforts were made to edit the dictations butoccasionally words are mis-transcribed.)    Ramon Galo Jr, MD (electronically signed)  AttendingEmergency Physician          Ramon Galo Jr., MD  10/25/24 2917     no

## 2024-10-25 NOTE — CONSULTS
[facility] Consult      Patient:   Saundra Finn  MR#:    [id number]  Account Number:                   [id number]      Room:    07/07   YOB: 1935  Date of Progress Note: 10/25/2024  Time of Note                           11:36 AM  Attending Physician:  Abraham Monet MD  Consulting Physician:  Chaz Delgado DO       CHIEF COMPLAINT:  Right sided weakness, aphasia     HISTORY OF PRESENT ILLNESS:   This is a 89 y.o. female who was admitted with right-sided weakness and speech difficulty.  Her last known well was last night, the specific time is not clear.  She does have a prior TIA history.  Possible atrial fibrillation history noted.  CT head with hyperdense MCA sign otherwise negative. CTA with left M1 occlusion.  Severe right ICA stenosis and moderate left ICA stenosis. CTP with left MCA ischemia.        REVIEW OF SYSTEMS:  Limited given speech difficulty     PAST MEDICAL HISTORY:      Diagnosis Date    A-fib (HCC) 07/28/2006    s/p surgery     Breast cancer (HCC) 2006    Cancer (HCC)     breast    Chest pain     Diabetes mellitus (HCC)     non-insulin dependent    Diaphoresis     profuse    Family history of early CAD     Gastroesophageal reflux disease     H/O bicuspid aortic valve 5/19/2015    History of blood transfusion     History of phlebitis     Hx of blood clots     Hyperlipidemia     Hypertension     Moderate tricuspid regurgitation 01/12/2016    Osteoarthritis     Stroke (HCC) 09/2020    TIA (transient ischemic attack) 9/7/2020    Valvular heart disease        PAST SURGICAL HISTORY:      Procedure Laterality Date    AORTIC VALVE REPLACEMENT  07/28/2006    Aortic valve replacement with 21 mm Janett-Lozano pericardial tissue valve.  Conrado Lim M.D.    APPENDECTOMY      AV FISTULA REPAIR      BREAST BIOPSY Left 2006    BREAST SURGERY  2006    left masectomy    CARDIOVERSION  01/14/2016    CATARACT REMOVAL      CHOLECYSTECTOMY      COLONOSCOPY      DIAGNOSTIC CARDIAC CATH

## 2024-10-25 NOTE — PROGRESS NOTES
4 Eyes Skin Assessment     NAME:  Saundra Finn  YOB: 1935  MEDICAL RECORD NUMBER:  [id number]    The patient is being assessed for  Admission    I agree that at least one RN has performed a thorough Head to Toe Skin Assessment on the patient. ALL assessment sites listed below have been assessed.      Areas assessed by both nurses:    Head, Face, Ears, Shoulders, Back, Chest, Arms, Elbows, Hands, Sacrum. Buttock, Coccyx, Ischium, and Legs. Feet and Heels        Does the Patient have a Wound? No noted wound(s)       Gee Prevention initiated by RN: Yes  Wound Care Orders initiated by RN: No    Pressure Injury (Stage 3,4, Unstageable, DTI, NWPT, and Complex wounds) if present, place Wound referral order by RN under : No    New Ostomies, if present place, Ostomy referral order under : No     Nurse 1 eSignature: Electronically signed by Sandor Price RN on 10/25/24 at 3:15 PM CDT    **SHARE this note so that the co-signing nurse can place an eSignature**    Nurse 2 eSignature: {Esignature:[id number]}

## 2024-10-25 NOTE — H&P
Moderate tricuspid regurgitation 01/12/2016    Osteoarthritis     Stroke (HCC) 09/2020    TIA (transient ischemic attack) 9/7/2020    Valvular heart disease          PAST SURGICAL HISTORY:  Past Surgical History:   Procedure Laterality Date    AORTIC VALVE REPLACEMENT  07/28/2006    Aortic valve replacement with 21 mm Janett-Lozano pericardial tissue valve.  Conrado Lim M.D.    APPENDECTOMY      AV FISTULA REPAIR      BREAST BIOPSY Left 2006    BREAST SURGERY  2006    left masectomy    CARDIOVERSION  01/14/2016    CATARACT REMOVAL      CHOLECYSTECTOMY      COLONOSCOPY      DIAGNOSTIC CARDIAC CATH LAB PROCEDURE  2006    left heart cath, left ventriculography and selective  coronary arteriography    EYE SURGERY      HYSTERECTOMY, TOTAL ABDOMINAL (CERVIX REMOVED)  1986    patient doesn't know if she has her ovaries    JOINT REPLACEMENT      MASTECTOMY Left 2006    TONSILLECTOMY AND ADENOIDECTOMY      TOTAL KNEE ARTHROPLASTY      bilateral total knee replacement        SOCIAL HISTORY:  Social History     Socioeconomic History    Marital status:      Spouse name: None    Number of children: 4    Years of education: None    Highest education level: None   Occupational History    Occupation: retired   Tobacco Use    Smoking status: Never    Smokeless tobacco: Never   Vaping Use    Vaping status: Never Used   Substance and Sexual Activity    Alcohol use: No    Drug use: No    Sexual activity: Defer     Partners: Male     Birth control/protection: Post-menopausal     Comment:    Social History Narrative    3/24:    Pt lives with her daughter in [place]. Has a small dog.     Another adult child - a son - now living in same home as pt and he is supportive, assistive to patient.    Continues to see [facility] providers at this time.    Patient does not drive - dtr provides transportation.     Social Determinants of Health     Financial Resource Strain: Low Risk  (1/24/2024)    Overall Financial Resource Strain (CARDIA)  H/O prosthetic aortic valve replacement    Hyperlipidemia    Essential hypertension    Type 2 diabetes mellitus without complication, without long-term current use of insulin (HCC)    Chronic atrial fibrillation ([facility])    Lauryn filter in place    UTI (urinary tract infection)  Resolved Problems:    * No resolved hospital problems. *       Principal Problem:    Stroke-like symptom   PT OT   Permissive hypertension   Antiplatelet therapy  PT hold anticoagulant  Active Problems:    H/O prosthetic aortic valve replacement   Continues telemetry    Hyperlipidemia   Continue statin    Essential hypertension   Permissive hypertension    Type 2 diabetes mellitus without complication, without long-term current use of insulin ([facility])   Low-dose insulin protocol  Accu-Chek before meals and at bedtime   Hypoglycemic protocol     Chronic atrial fibrillation (HCC)   Telemetry    Lauryn filter in place   Noted    UTI (urinary tract infection)     Rocephin every 24 for 3 days  Resolved Problems:    * No resolved hospital problems. *        AYDEE Lowery - CNP  4:28 PM 10/25/2024      DISCLAIMER: This note was created with electronic voice recognition which does have occasional errors.  If you have any questions regarding the content within the note please do not hesitate to contact me... Thanks.

## 2024-10-26 ENCOUNTER — APPOINTMENT (OUTPATIENT)
Dept: MRI IMAGING | Age: 89
DRG: 064 | End: 2024-10-26
Payer: MEDICARE

## 2024-10-26 PROBLEM — E43 SEVERE MALNUTRITION (HCC): Chronic | Status: ACTIVE | Noted: 2024-10-26

## 2024-10-26 LAB
ALBUMIN SERPL-MCNC: 3.6 G/DL (ref 3.5–5.2)
ALP SERPL-CCNC: 59 U/L (ref 35–104)
ALT SERPL-CCNC: 7 U/L (ref 5–33)
ANION GAP SERPL CALCULATED.3IONS-SCNC: 15 MMOL/L (ref 7–19)
AST SERPL-CCNC: 15 U/L (ref 5–32)
BASOPHILS # BLD: 0 K/UL (ref 0–0.2)
BASOPHILS NFR BLD: 0.4 % (ref 0–1)
BILIRUB SERPL-MCNC: 0.3 MG/DL (ref 0.2–1.2)
BUN SERPL-MCNC: 16 MG/DL (ref 8–23)
CALCIUM SERPL-MCNC: 8.8 MG/DL (ref 8.8–10.2)
CHLORIDE SERPL-SCNC: 106 MMOL/L (ref 98–111)
CO2 SERPL-SCNC: 22 MMOL/L (ref 22–29)
CREAT SERPL-MCNC: 0.8 MG/DL (ref 0.5–0.9)
ECHO AO ASC DIAM: 3.4 CM
ECHO AO ASCENDING AORTA INDEX: 2.45 CM/M2
ECHO AO ROOT DIAM: 2.5 CM
ECHO AO ROOT INDEX: 1.8 CM/M2
ECHO AO SINUS VALSALVA DIAM: 2.8 CM
ECHO AO SINUS VALSALVA INDEX: 2.01 CM/M2
ECHO AO ST JNCT DIAM: 2.5 CM
ECHO AV AREA PEAK VELOCITY: 0.7 CM2
ECHO AV AREA VTI: 0.6 CM2
ECHO AV AREA/BSA PEAK VELOCITY: 0.5 CM2/M2
ECHO AV AREA/BSA VTI: 0.4 CM2/M2
ECHO AV MEAN GRADIENT: 24 MMHG
ECHO AV MEAN VELOCITY: 2.4 M/S
ECHO AV PEAK GRADIENT: 35 MMHG
ECHO AV PEAK VELOCITY: 3 M/S
ECHO AV REGURGITANT FRACTION: -1400 %
ECHO AV REGURGITANT VOLUME: -641.8 ML
ECHO AV VELOCITY RATIO: 0.2
ECHO AV VTI: 83.2 CM
ECHO BSA: 1.39 M2
ECHO LA AREA 2C: 24.3 CM2
ECHO LA AREA 4C: 22.9 CM2
ECHO LA DIAMETER INDEX: 3.09 CM/M2
ECHO LA DIAMETER: 4.3 CM
ECHO LA MAJOR AXIS: 7.1 CM
ECHO LA MINOR AXIS: 6.3 CM
ECHO LA TO AORTIC ROOT RATIO: 1.72
ECHO LA VOL BP: 71 ML (ref 22–52)
ECHO LA VOL MOD A2C: 73 ML (ref 22–52)
ECHO LA VOL MOD A4C: 63 ML (ref 22–52)
ECHO LA VOL/BSA BIPLANE: 51 ML/M2 (ref 16–34)
ECHO LA VOLUME INDEX MOD A2C: 53 ML/M2 (ref 16–34)
ECHO LA VOLUME INDEX MOD A4C: 45 ML/M2 (ref 16–34)
ECHO LV E' LATERAL VELOCITY: 12.6 CM/S
ECHO LV E' SEPTAL VELOCITY: 8.5 CM/S
ECHO LV EDV A2C: 77 ML
ECHO LV EDV A4C: 56 ML
ECHO LV EDV INDEX A4C: 40 ML/M2
ECHO LV EDV NDEX A2C: 55 ML/M2
ECHO LV EJECTION FRACTION A2C: 59 %
ECHO LV EJECTION FRACTION A4C: 59 %
ECHO LV EJECTION FRACTION BIPLANE: 58 % (ref 55–100)
ECHO LV ESV A2C: 32 ML
ECHO LV ESV A4C: 23 ML
ECHO LV ESV INDEX A2C: 23 ML/M2
ECHO LV ESV INDEX A4C: 17 ML/M2
ECHO LV FRACTIONAL SHORTENING: 32 % (ref 28–44)
ECHO LV INTERNAL DIMENSION DIASTOLE INDEX: 2.73 CM/M2
ECHO LV INTERNAL DIMENSION DIASTOLIC: 3.8 CM (ref 3.9–5.3)
ECHO LV INTERNAL DIMENSION SYSTOLIC INDEX: 1.87 CM/M2
ECHO LV INTERNAL DIMENSION SYSTOLIC: 2.6 CM
ECHO LV ISOVOLUMETRIC RELAXATION TIME (IVRT): 67 MS
ECHO LV IVSD: 0.8 CM (ref 0.6–0.9)
ECHO LV MASS 2D: 93.4 G (ref 67–162)
ECHO LV MASS INDEX 2D: 67.2 G/M2 (ref 43–95)
ECHO LV POSTERIOR WALL DIASTOLIC: 0.9 CM (ref 0.6–0.9)
ECHO LV RELATIVE WALL THICKNESS RATIO: 0.47
ECHO LVOT AREA: 3.1 CM2
ECHO LVOT AV VTI INDEX: 0.18
ECHO LVOT DIAM: 2 CM
ECHO LVOT MEAN GRADIENT: 1 MMHG
ECHO LVOT PEAK GRADIENT: 2 MMHG
ECHO LVOT PEAK VELOCITY: 0.6 M/S
ECHO LVOT STROKE VOLUME INDEX: 33 ML/M2
ECHO LVOT SV: 45.8 ML
ECHO LVOT VTI: 14.6 CM
ECHO MV A VELOCITY: 0.66 M/S
ECHO MV ANNULUS DIAMETER: 2.2 CM
ECHO MV AREA VTI: 2.2 CM2
ECHO MV E DECELERATION TIME (DT): 197 MS
ECHO MV E VELOCITY: 1.17 M/S
ECHO MV E/A RATIO: 1.77
ECHO MV E/E' LATERAL: 9.29
ECHO MV E/E' RATIO (AVERAGED): 11.53
ECHO MV E/E' SEPTAL: 13.76
ECHO MV LVOT VTI INDEX: 1.43
ECHO MV MAX VELOCITY: 1.1 M/S
ECHO MV MEAN GRADIENT: 3 MMHG
ECHO MV MEAN VELOCITY: 0.7 M/S
ECHO MV PEAK GRADIENT: 5 MMHG
ECHO MV REGURGITANT FRACTION CONT EQ: 93 %
ECHO MV REGURGITANT PEAK GRADIENT: 125 MMHG
ECHO MV REGURGITANT PEAK VELOCITY: 5.6 M/S
ECHO MV REGURGITANT VOLUME: 641.85 ML
ECHO MV REGURGITANT VTIA: 181 CM
ECHO MV VTI: 20.9 CM
ECHO RA AREA 4C: 12.4 CM2
ECHO RA END SYSTOLIC VOLUME APICAL 4 CHAMBER INDEX BSA: 17 ML/M2
ECHO RA MAJOR AXIS INDEX: 4.17 CM/M2
ECHO RA MAJOR AXIS: 5.8 CM
ECHO RA MINOR AXIS INDEX: 1.87 CM/M2
ECHO RA MINOR AXIS: 2.6 CM
ECHO RA VOLUME: 23 ML
ECHO RV BASAL DIMENSION: 2.8 CM
ECHO RV INTERNAL DIMENSION: 3.6 CM
ECHO RV LONGITUDINAL DIMENSION: 7 CM
ECHO RV MID DIMENSION: 3.5 CM
ECHO RV TAPSE: 1.4 CM (ref 1.7–?)
ECHO TV REGURGITANT MAX VELOCITY: 3.47 M/S
ECHO TV REGURGITANT PEAK GRADIENT: 48 MMHG
EOSINOPHIL # BLD: 0 K/UL (ref 0–0.6)
EOSINOPHIL NFR BLD: 0 % (ref 0–5)
ERYTHROCYTE [DISTWIDTH] IN BLOOD BY AUTOMATED COUNT: 13.1 % (ref 11.5–14.5)
GLUCOSE BLD-MCNC: 110 MG/DL (ref 70–99)
GLUCOSE BLD-MCNC: 118 MG/DL (ref 70–99)
GLUCOSE BLD-MCNC: 118 MG/DL (ref 70–99)
GLUCOSE SERPL-MCNC: 111 MG/DL (ref 70–99)
HCT VFR BLD AUTO: 38 % (ref 37–47)
HGB BLD-MCNC: 11.9 G/DL (ref 12–16)
IMM GRANULOCYTES # BLD: 0 K/UL
LYMPHOCYTES # BLD: 1.6 K/UL (ref 1.1–4.5)
LYMPHOCYTES NFR BLD: 16.1 % (ref 20–40)
MCH RBC QN AUTO: 30.4 PG (ref 27–31)
MCHC RBC AUTO-ENTMCNC: 31.3 G/DL (ref 33–37)
MCV RBC AUTO: 96.9 FL (ref 81–99)
MONOCYTES # BLD: 0.6 K/UL (ref 0–0.9)
MONOCYTES NFR BLD: 6 % (ref 0–10)
NEUTROPHILS # BLD: 7.5 K/UL (ref 1.5–7.5)
NEUTS SEG NFR BLD: 77.1 % (ref 50–65)
PERFORMED ON: ABNORMAL
PLATELET # BLD AUTO: 248 K/UL (ref 130–400)
PMV BLD AUTO: 9.5 FL (ref 9.4–12.3)
POTASSIUM SERPL-SCNC: 3.8 MMOL/L (ref 3.5–5)
PROT SERPL-MCNC: 6.5 G/DL (ref 6.4–8.3)
RBC # BLD AUTO: 3.92 M/UL (ref 4.2–5.4)
SODIUM SERPL-SCNC: 143 MMOL/L (ref 136–145)
WBC # BLD AUTO: 9.7 K/UL (ref 4.8–10.8)

## 2024-10-26 PROCEDURE — 99232 SBSQ HOSP IP/OBS MODERATE 35: CPT | Performed by: PSYCHIATRY & NEUROLOGY

## 2024-10-26 PROCEDURE — 36415 COLL VENOUS BLD VENIPUNCTURE: CPT

## 2024-10-26 PROCEDURE — 6360000002 HC RX W HCPCS: Performed by: NURSE PRACTITIONER

## 2024-10-26 PROCEDURE — 1200000000 HC SEMI PRIVATE

## 2024-10-26 PROCEDURE — 92610 EVALUATE SWALLOWING FUNCTION: CPT

## 2024-10-26 PROCEDURE — 6370000000 HC RX 637 (ALT 250 FOR IP): Performed by: NURSE PRACTITIONER

## 2024-10-26 PROCEDURE — 94760 N-INVAS EAR/PLS OXIMETRY 1: CPT

## 2024-10-26 PROCEDURE — 80053 COMPREHEN METABOLIC PANEL: CPT

## 2024-10-26 PROCEDURE — 85025 COMPLETE CBC W/AUTO DIFF WBC: CPT

## 2024-10-26 PROCEDURE — 82962 GLUCOSE BLOOD TEST: CPT

## 2024-10-26 PROCEDURE — 2580000003 HC RX 258: Performed by: NURSE PRACTITIONER

## 2024-10-26 PROCEDURE — 70551 MRI BRAIN STEM W/O DYE: CPT

## 2024-10-26 RX ORDER — DEXTROSE, SODIUM CHLORIDE, SODIUM LACTATE, POTASSIUM CHLORIDE, AND CALCIUM CHLORIDE 5; .6; .31; .03; .02 G/100ML; G/100ML; G/100ML; G/100ML; G/100ML
INJECTION, SOLUTION INTRAVENOUS CONTINUOUS
Status: DISCONTINUED | OUTPATIENT
Start: 2024-10-26 | End: 2024-10-31 | Stop reason: HOSPADM

## 2024-10-26 RX ADMIN — SODIUM CHLORIDE, PRESERVATIVE FREE 10 ML: 5 INJECTION INTRAVENOUS at 12:32

## 2024-10-26 RX ADMIN — NIACIN 500 MG: 500 TABLET, EXTENDED RELEASE ORAL at 21:28

## 2024-10-26 RX ADMIN — WATER 1000 MG: 1 INJECTION INTRAMUSCULAR; INTRAVENOUS; SUBCUTANEOUS at 14:42

## 2024-10-26 RX ADMIN — GABAPENTIN 200 MG: 100 CAPSULE ORAL at 21:28

## 2024-10-26 RX ADMIN — SODIUM CHLORIDE, SODIUM LACTATE, POTASSIUM CHLORIDE, CALCIUM CHLORIDE AND DEXTROSE MONOHYDRATE: 5; 600; 310; 30; 20 INJECTION, SOLUTION INTRAVENOUS at 12:30

## 2024-10-26 RX ADMIN — ATORVASTATIN CALCIUM 80 MG: 80 TABLET, FILM COATED ORAL at 21:28

## 2024-10-26 RX ADMIN — SODIUM CHLORIDE, PRESERVATIVE FREE 10 ML: 5 INJECTION INTRAVENOUS at 21:38

## 2024-10-26 NOTE — PROGRESS NOTES
Spiritual Health History and Assessment/Progress Note  [facility]    Loneliness/Social Isolation, Trauma, Life Adjustments, Adjustment to illness,      Name: Saundra Finn MRN: [id number]    Age: 89 y.o.     Sex: female   Language: English   Taoism: Tenriism   Stroke-like symptom     Date: 10/25/2024            Total Time Calculated: 30 min              Spiritual Assessment began in [facility] SURG SERVICES        Referral/Consult From: Nurse   Encounter Overview/Reason: Loneliness/Social Isolation  Service Provided For: Patient, Patient and family together    Cait, Belief, Meaning:   Patient is connected with a cait tradition or spiritual practice. The patient grew in a Synagogue home and  raised all her children in Synagogue cait and anchored her life in Synagogue tradition.  The Bible and cait plays a rich tradition in her life. All her emotional needs are met by the Synagogue cait.  Family/Friends identify as spiritual. All her family and grand children are raised in the Mandaen and Anglican tradition.      Importance and Influence:  Patient has spiritual/personal beliefs that influence decisions regarding their health  Family/Friends have spiritual/personal beliefs that influence decisions regarding the patient's health    Community:  Patient is connected with a spiritual community  Family/Friends feel well-supported. Support system includes: Children and Cait Community    Assessment and Plan of Care:   The patients emotional and spiritual needs are met by her Synagogue fellowship.  Her devotional life provides her spiritual needs and provides care for socialization and belonging to herself.  Patient Interventions include: Facilitated expression of thoughts and feelings  Family/Friends Interventions include: Explored spiritual coping/struggle/distress    Patient Plan of Care: No spiritual needs identified for follow-up  Family/Friends Plan of Care: No future visits per patient/family

## 2024-10-26 NOTE — CARE COORDINATION
SW spoke with nurse to confirm why she needed [facility]. They stated that they put the consult in due to her dementia, though they may be looking into considering hospice.     Will continue to follow

## 2024-10-26 NOTE — PROGRESS NOTES
[facility] Neurology Progress Note      Patient:   Saundra Finn  MR#:    [id number]   Room:    [facility]/531-01   YOB: 1935  Date of Progress Note: 10/26/2024  Time of Note                           11:09 AM  Consulting Physician:  Chaz Delgado DO  Attending Physician:  Abraham Monet MD      INTERVAL HISTORY:  No acute events, no improvement overnight, largely unchanged.     REVIEW OF SYSTEMS:  Limited given speech difficulty     PHYSICAL EXAM:    Constitutional -   BP (!) 151/90   Pulse 94   Temp 98.1 °F (36.7 °C) (Temporal)   Resp 16   Ht 1.499 m (4' 11\")   Wt 46.3 kg (102 lb)   SpO2 97%   BMI 20.60 kg/m²   General appearance: No acute distress   EYES -   Conjunctiva normal  Pupillary exam as below, see CN exam in the neurologic exam  ENT-    No scars, masses, or lesions over external nose or ears  Oropharynx without erythema, palate midline  Cardiovascular -   No clubbing, cyanosis, or edema   Pulmonary-   Good expansion, normal effort without use of accessory muscles  Musculoskeletal -   No significant wasting of muscles noted  Gait as below, see gait exam in the neurologic exam  Muscle strength, tone, stability as below see the motor exam in the neurologic exam.   No bony deformities  Skin -   Warm, dry, and intact to inspection and palpation.    No rash, erythema, or pallor        NEUROLOGICAL EXAM     Mental status    [x] Awake, alert  [x] Affect attention and concentration appear appropriate  [] Recent and remote memory appears unremarkable  [] Speech normal without dysarthria or aphasia, comprehension and repetition intact.   COMMENTS:Awake, following some commands, speech difficulty noted likely aphasic    Cranial Nerves [] No VF deficit to confrontation,  optic discs normal, no papilledema on fundoscopic exam.  [] PERRLA, EOMI, no nystagmus, conjugate eye movements, no ptosis  [] Face symmetric  [] Facial sensation intact  [] Tongue midline no atrophy or fasciculations present  []  moderate left ICA stenosis. CTP suggestive of left MCA ischemia.  Questionable history of atrial fibrillation noted in the chart.  Exam unchanged.      PLAN:   Supportive care   Follow up MRI brain    Follow up ECHO, Tele    Antiplatelet therapy, statin, permissive hypertension.    Infarct is likely large, hold off on anticoagulation, also unclear if truly has underling a fib.     Medical management of carotid stenosis, given advance age and functional status would likely not be a good surgical candidate for carotid intervention.    PT, OT, ST   Consider palliative care, hospice if worsens.     Please feel free to call with any questions.   [phone] (cell phone).      Chaz Delgado DO  Board Certified Neurology

## 2024-10-26 NOTE — PROGRESS NOTES
Comprehensive Nutrition Assessment    Type and Reason for Visit:  Initial, Positive Nutrition Screen    Nutrition Recommendations/Plan:   Dx Severe malnutrition in context of chronic illness.   Await SLP eval.   Start Magic Cup BID if appropriate when diet advances.      Malnutrition Assessment:  Malnutrition Status:  Severe malnutrition (10/26/24 0858)    Context:  Chronic Illness     Findings of the 6 clinical characteristics of malnutrition:  Energy Intake:  75% or less estimated energy requirements for 1 month or longer  Weight Loss:  Greater than 7.5% over 3 months     Body Fat Loss:  Severe body fat loss Orbital, Buccal region   Muscle Mass Loss:  Severe muscle mass loss Temples (temporalis), Clavicles (pectoralis & deltoids)  Fluid Accumulation:  Mild Extremities   Strength:  Not Performed    Nutrition Assessment:    +NS for pt-reported wt loss and decreased appetite. Pt presents severely malnourished AEB severe muscle wasting, severe fat loss, wt loss >7.5% in 3 months, and inadequate energy intake. Assessment completed with pt's son at bedside. He states pt has been losing weight gradually over the last several years. He notes she does not eat much at home. She does not like oral nutrition supplements. Pt remains NPO at this time awaiting swallow eval with SLP. Pt's son agreeable to trying Magic Cup with pt once her diet is upgraded. Will follow and add ONS when appropriate.    Nutrition Related Findings:    trace BLE edema; BM 10/25 Wound Type: None       Current Nutrition Intake & Therapies:    Average Meal Intake: NPO  Average Supplements Intake: NPO  Diet NPO    Anthropometric Measures:  Height: 149.9 cm (4' 11\")  Ideal Body Weight (IBW): 95 lbs (43 kg)       Current Body Weight: 46.3 kg (102 lb), 107.4 % IBW.    Current BMI (kg/m2): 20.6  Usual Body Weight: 50.8 kg (112 lb) (8/7/24)  % Weight Change (Calculated): -8.9   BMI Categories: Underweight (BMI less than 22) age over 65    Estimated Daily  Nutrient Needs:  Energy Requirements Based On: Kcal/kg  Weight Used for Energy Requirements: Current  Energy (kcal/day): 5479-1801 (30-35kcal/kg)  Weight Used for Protein Requirements: Current  Protein (g/day): 69-93 (1.5-2.0g/kg)  Method Used for Fluid Requirements: 1 ml/kcal  Fluid (ml/day): 4015-4430    Nutrition Diagnosis:   Severe malnutrition related to inadequate protein-energy intake as evidenced by Criteria as identified in malnutrition assessment    Nutrition Interventions:   Food and/or Nutrient Delivery: Continue NPO  Nutrition Education/Counseling: No recommendation at this time  Coordination of Nutrition Care: Swallow Evaluation, Continue to monitor while inpatient       Goals:     Goals: Initiate PO diet, PO intake 50% or greater       Nutrition Monitoring and Evaluation:   Behavioral-Environmental Outcomes: None Identified  Food/Nutrient Intake Outcomes: Diet Advancement/Tolerance, Food and Nutrient Intake  Physical Signs/Symptoms Outcomes: Biochemical Data, Nutrition Focused Physical Findings, Weight, Chewing or Swallowing    Discharge Planning:    Too soon to determine     Deja Martin, MS, RD, LD, [facility]  Contact: [phone]

## 2024-10-26 NOTE — PROGRESS NOTES
Physical Therapy      Pt out of room for testing. Will cont to follow.    Electronically signed by Beba Ewing PT on 10/26/2024 at 1:00 PM

## 2024-10-26 NOTE — PLAN OF CARE
Problem: Chronic Conditions and Co-morbidities  Goal: Patient's chronic conditions and co-morbidity symptoms are monitored and maintained or improved  Outcome: Progressing  Flowsheets (Taken 10/25/2024 2233)  Care Plan - Patient's Chronic Conditions and Co-Morbidity Symptoms are Monitored and Maintained or Improved: Monitor and assess patient's chronic conditions and comorbid symptoms for stability, deterioration, or improvement     Problem: Discharge Planning  Goal: Discharge to home or other facility with appropriate resources  Outcome: Progressing  Flowsheets (Taken 10/25/2024 2233)  Discharge to home or other facility with appropriate resources: Identify barriers to discharge with patient and caregiver     Problem: Skin/Tissue Integrity  Goal: Absence of new skin breakdown  Description: 1.  Monitor for areas of redness and/or skin breakdown  2.  Assess vascular access sites hourly  3.  Every 4-6 hours minimum:  Change oxygen saturation probe site  4.  Every 4-6 hours:  If on nasal continuous positive airway pressure, respiratory therapy assess nares and determine need for appliance change or resting period.  Outcome: Progressing     Problem: Safety - Adult  Goal: Free from fall injury  Outcome: Progressing  Flowsheets (Taken 10/25/2024 2354)  Free From Fall Injury: Instruct family/caregiver on patient safety     Problem: ABCDS Injury Assessment  Goal: Absence of physical injury  Outcome: Progressing  Flowsheets (Taken 10/25/2024 2354)  Absence of Physical Injury: Implement safety measures based on patient assessment     Problem: Neurosensory - Adult  Goal: Achieves stable or improved neurological status  Outcome: Progressing  Flowsheets (Taken 10/25/2024 2233)  Achieves stable or improved neurological status: Assess for and report changes in neurological status  Goal: Absence of seizures  Outcome: Progressing  Flowsheets (Taken 10/25/2024 2233)  Absence of seizures: Monitor for seizure activity.  If seizure

## 2024-10-26 NOTE — PROGRESS NOTES
SLP ALL NOTES  Facility/Department: [facility] SURG SERVICES   CLINICAL BEDSIDE SWALLOW EVALUATION    NAME: Saundra Finn  : 1935  MRN: [id number]    ADMISSION DATE: 10/25/2024  ADMITTING DIAGNOSIS: has H/O prosthetic aortic valve replacement; Hyperlipidemia; Family history of early CAD; Insomnia; H/O bicuspid aortic valve; History of aortic stenosis; Moderate tricuspid regurgitation; Essential hypertension; Type 2 diabetes mellitus without complication, without long-term current use of insulin (HCC); Chronic atrial fibrillation (HCC); Cache filter in place; Breast cancer (HCC); History of adenomatous polyp of colon; TIA (transient ischemic attack); Stage 3a chronic kidney disease (HCC); Chronic renal disease, stage III (HCC); Age-related osteoporosis without current pathological fracture; Bilateral leg edema; Chronic diarrhea; Dysthymia; 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  ONSET DATE: 10/25/2024    Recent Chest Xray/CT of Chest: (10/25/2024)    Date of Eval: 10/26/2024  Evaluating Therapist: MARCELLA Avila    Current Diet level:  Current Diet : NPO  Current Liquid Diet : NPO    Primary Complaint  No complaints at this time.    Reason for Referral  Saundra Finn was referred for a bedside swallow evaluation to assess the efficiency of her swallow function, identify signs and symptoms of aspiration and make recommendations regarding safe dietary consistencies, effective compensatory strategies, and safe eating environment.    Impression  Dysphagia Diagnosis: Moderate to severe  Weak;Hoarse  Volitional Cough: Weak  Volitional Swallow: Delayed  Prior Dysphagia History: No history at this time  Consistencies Administered: Ice Chips;Thin - teaspoon;Mildly Thick- teaspoon;Mildly Thick - cup;Pureed    Vision/Hearing   Patient has glasses that are not at the hospital at this time. Patient does not have hearing aids, but is hard of hearing.     Oral Motor Deficits  Labial: Right droop;Decreased seal;Decreased rate;Impaired coordination;Flaccid  Dentition: Edentulous  Oral Hygiene: Dried secretions  Lingual: Incoordinated;Decreased rate;Decreased strength;Right deviation  Velum: No Impairment  Mandible: No impairment  Gag: No Impairment  Consistencies Administered: Ice Chips;Thin - teaspoon;Mildly Thick- teaspoon;Mildly Thick - cup;Pureed    Oral Phase Dysfunction  Oral Phase  Oral Phase: Exceptions  Oral Phase  Oral Phase - Comment: Patient demonstrated prolonged oral holding with all PO trials presented. Patient demonstrated suspected premature bolus loss and immediate cough with thin liquids via spoon.     Indicators of Pharyngeal Phase Dysfunction   Indicators of Pharyngeal Phase Dysfunction  Pharyngeal Phase Comment: Patient demonstrated weak and decreased laryngeal elevation. Patient coughed on thin liquids presented via spoon. Patient demonstrated wet vocal quality but was able to clear independently.    Prognosis  Prognosis: Guarded  Prognosis Considerations: Severity of Impairments  Consulted and agree with results and recommendations: Family member;RN  Family member consulted: four children  RN Name: Taty Sanches  Patient Education Response: No evidence of learning                     MARCELLA Avila  10/26/2024 2:54 PM

## 2024-10-26 NOTE — PROGRESS NOTES
[facility]      [facility]    Patient:  Saundra Finn  YOB: 1935  Date of Service: 10/26/2024  MRN: [id number]   Acct: [id number]   Primary Care Physician: Margarita Jerome MD  Advance Directive: DNR  Admit Date: 10/25/2024       Hospital Day: 1    Portions of this note have been copied forward, however, updated to reflect the most current clinical status of this patient.     CHIEF COMPLAINT strokelike    SUBJECTIVE: Did not pass bedside swallow study and she is hungry and thirsty      CUMULATIVE HOSPITAL COURSE:    Saundra Finn is an 89 y.o. female.  With diagnoses listed below.  Patient also has chronic A-fib and IVC filter and she has had her aortic valve replaced.   Patient brought to the ER after altered mental status noticed by family this morning.  Patient's last known well was last p.m. before bed.  This a.m. her speech was garbled.  Patient is not on any anticoagulants for her A-fib.    ER eval electrolytes within normal limits with a glucose of 142, white count 8.8 hemoglobin 12.5 hematocrit 40.3.  Urine with 4+ bacteria large leukocyte esterase small blood.  Chest x-ray with no acute findings.  CT shows severe stenosis of right internal carotid artery and mid distal cervical and intracranial segments with moderate stenosis of the left intracranial internal carotid artery she has an occluded left MCA distal M1 segment and moderate stenosis of the right vertebral artery.  CT perfusion is admits mismatch of 55 mL .  Patient and family opted not to be transported to an interventional radiologist.  Plus,  she was outside of the window intervention.  Family requested DNR status.  Patient admitted to hospitalist with neurology consulted          Objective:   VITALS:  BP (!) 151/90   Pulse 94   Temp 98.1 °F (36.7 °C) (Temporal)   Resp 16   Ht 1.499 m (4' 11\")   Wt 46.3 kg (102 lb)   SpO2 93%   BMI 20.60 kg/m²   24HR INTAKE/OUTPUT:  No intake or output data in the 24 hours ending  days  Resolved Problems:    * No resolved hospital problems. *       DVT Prophylaxis: holding       Discharge planning: tbd       Further Orders per Clinical course/attending.     Electronically signed by AYDEE Lowery CNP on 10/26/2024 at 9:54 AM       EMR Dragon/Transcription disclaimer:   Much of this encounter note is an electronic transcription/translation of spoken language to printed text. The electronic translation of spoken language may permit erroneous, or at times, nonsensical words or phrases to be inadvertently transcribed; although attempts have made to review the note for such errors, some may still exist.

## 2024-10-27 LAB
ALBUMIN SERPL-MCNC: 3.4 G/DL (ref 3.5–5.2)
ALP SERPL-CCNC: 54 U/L (ref 35–104)
ALT SERPL-CCNC: 7 U/L (ref 5–33)
ANION GAP SERPL CALCULATED.3IONS-SCNC: 14 MMOL/L (ref 7–19)
AST SERPL-CCNC: 13 U/L (ref 5–32)
BACTERIA UR CULT: ABNORMAL
BASOPHILS # BLD: 0 K/UL (ref 0–0.2)
BASOPHILS NFR BLD: 0.3 % (ref 0–1)
BILIRUB SERPL-MCNC: 0.5 MG/DL (ref 0.2–1.2)
BUN SERPL-MCNC: 18 MG/DL (ref 8–23)
CALCIUM SERPL-MCNC: 8.6 MG/DL (ref 8.8–10.2)
CHLORIDE SERPL-SCNC: 107 MMOL/L (ref 98–111)
CO2 SERPL-SCNC: 23 MMOL/L (ref 22–29)
CREAT SERPL-MCNC: 0.7 MG/DL (ref 0.5–0.9)
EOSINOPHIL # BLD: 0 K/UL (ref 0–0.6)
EOSINOPHIL NFR BLD: 0.1 % (ref 0–5)
ERYTHROCYTE [DISTWIDTH] IN BLOOD BY AUTOMATED COUNT: 13.2 % (ref 11.5–14.5)
GLUCOSE BLD-MCNC: 110 MG/DL (ref 70–99)
GLUCOSE BLD-MCNC: 115 MG/DL (ref 70–99)
GLUCOSE BLD-MCNC: 118 MG/DL (ref 70–99)
GLUCOSE BLD-MCNC: 142 MG/DL (ref 70–99)
GLUCOSE SERPL-MCNC: 108 MG/DL (ref 70–99)
HCT VFR BLD AUTO: 37.3 % (ref 37–47)
HGB BLD-MCNC: 11.4 G/DL (ref 12–16)
IMM GRANULOCYTES # BLD: 0 K/UL
LYMPHOCYTES # BLD: 1.4 K/UL (ref 1.1–4.5)
LYMPHOCYTES NFR BLD: 17.6 % (ref 20–40)
MAGNESIUM SERPL-MCNC: 1.7 MG/DL (ref 1.6–2.4)
MCH RBC QN AUTO: 30.2 PG (ref 27–31)
MCHC RBC AUTO-ENTMCNC: 30.6 G/DL (ref 33–37)
MCV RBC AUTO: 98.9 FL (ref 81–99)
MONOCYTES # BLD: 0.7 K/UL (ref 0–0.9)
MONOCYTES NFR BLD: 8.4 % (ref 0–10)
NEUTROPHILS # BLD: 5.7 K/UL (ref 1.5–7.5)
NEUTS SEG NFR BLD: 73.2 % (ref 50–65)
ORGANISM: ABNORMAL
ORGANISM: ABNORMAL
PERFORMED ON: ABNORMAL
PLATELET # BLD AUTO: 225 K/UL (ref 130–400)
PMV BLD AUTO: 9.7 FL (ref 9.4–12.3)
POTASSIUM SERPL-SCNC: 3.4 MMOL/L (ref 3.5–5)
PROT SERPL-MCNC: 5.8 G/DL (ref 6.4–8.3)
RBC # BLD AUTO: 3.77 M/UL (ref 4.2–5.4)
SODIUM SERPL-SCNC: 144 MMOL/L (ref 136–145)
WBC # BLD AUTO: 7.8 K/UL (ref 4.8–10.8)

## 2024-10-27 PROCEDURE — 6370000000 HC RX 637 (ALT 250 FOR IP): Performed by: NURSE PRACTITIONER

## 2024-10-27 PROCEDURE — 36415 COLL VENOUS BLD VENIPUNCTURE: CPT

## 2024-10-27 PROCEDURE — 97530 THERAPEUTIC ACTIVITIES: CPT

## 2024-10-27 PROCEDURE — 2580000003 HC RX 258: Performed by: NURSE PRACTITIONER

## 2024-10-27 PROCEDURE — 83735 ASSAY OF MAGNESIUM: CPT

## 2024-10-27 PROCEDURE — 99233 SBSQ HOSP IP/OBS HIGH 50: CPT | Performed by: PSYCHIATRY & NEUROLOGY

## 2024-10-27 PROCEDURE — 1200000000 HC SEMI PRIVATE

## 2024-10-27 PROCEDURE — 94760 N-INVAS EAR/PLS OXIMETRY 1: CPT

## 2024-10-27 PROCEDURE — 97161 PT EVAL LOW COMPLEX 20 MIN: CPT

## 2024-10-27 PROCEDURE — 85025 COMPLETE CBC W/AUTO DIFF WBC: CPT

## 2024-10-27 PROCEDURE — 82962 GLUCOSE BLOOD TEST: CPT

## 2024-10-27 PROCEDURE — 80053 COMPREHEN METABOLIC PANEL: CPT

## 2024-10-27 PROCEDURE — 6360000002 HC RX W HCPCS: Performed by: NURSE PRACTITIONER

## 2024-10-27 RX ADMIN — ATORVASTATIN CALCIUM 80 MG: 80 TABLET, FILM COATED ORAL at 21:50

## 2024-10-27 RX ADMIN — SERTRALINE HYDROCHLORIDE 25 MG: 50 TABLET ORAL at 08:07

## 2024-10-27 RX ADMIN — PANTOPRAZOLE SODIUM 40 MG: 40 TABLET, DELAYED RELEASE ORAL at 08:07

## 2024-10-27 RX ADMIN — WATER 1000 MG: 1 INJECTION INTRAMUSCULAR; INTRAVENOUS; SUBCUTANEOUS at 12:45

## 2024-10-27 RX ADMIN — GABAPENTIN 100 MG: 100 CAPSULE ORAL at 12:45

## 2024-10-27 RX ADMIN — SODIUM CHLORIDE, PRESERVATIVE FREE 10 ML: 5 INJECTION INTRAVENOUS at 22:22

## 2024-10-27 RX ADMIN — GABAPENTIN 200 MG: 100 CAPSULE ORAL at 08:07

## 2024-10-27 RX ADMIN — NIACIN 500 MG: 500 TABLET, EXTENDED RELEASE ORAL at 21:50

## 2024-10-27 RX ADMIN — Medication 1 TABLET: at 08:07

## 2024-10-27 RX ADMIN — GABAPENTIN 200 MG: 100 CAPSULE ORAL at 21:49

## 2024-10-27 NOTE — PROGRESS NOTES
Physical Therapy  Facility/Department: [facility] SURG SERVICES  Physical Therapy Initial Assessment    Name: Saundra Finn  : 1935  MRN: [id number]  Date of Service: 10/27/2024    Discharge Recommendations:  Continue to assess pending progress, Patient would benefit from continued therapy after discharge          Patient Diagnosis(es): The primary encounter diagnosis was Cerebrovascular accident (CVA), unspecified mechanism (HCC). Diagnoses of Acute right-sided weakness, Cerebral artery occlusion, and Acute CVA (cerebrovascular accident) (HCC) were also pertinent to this visit.  Past Medical History:  has a past medical history of A-fib (HCC), Breast cancer (HCC), Cancer (HCC), Chest pain, Diabetes mellitus (HCC), Diaphoresis, Family history of early CAD, Gastroesophageal reflux disease, H/O bicuspid aortic valve, History of blood transfusion, History of phlebitis, Hx of blood clots, Hyperlipidemia, Hypertension, Moderate tricuspid regurgitation, Osteoarthritis, Stroke (HCC), TIA (transient ischemic attack), and Valvular heart disease.  Past Surgical History:  has a past surgical history that includes Total knee arthroplasty; Diagnostic Cardiac Cath Lab Procedure (); Cataract removal; Tonsillectomy and adenoidectomy; Cholecystectomy; AV fistula repair; Aortic valve replacement (2006); Colonoscopy; Appendectomy; eye surgery; joint replacement; Cardioversion (2016); Breast surgery (); Mastectomy (Left, ); Hysterectomy, total abdominal (); and Breast biopsy (Left, ).    Assessment  Body Structures, Functions, Activity Limitations Requiring Skilled Therapeutic Intervention: Decreased functional mobility ;Decreased ADL status;Decreased endurance;Decreased ROM;Decreased balance;Decreased posture  Assessment: Pt REQUIRES ASSIST FOR ALL MOBILITY AT THIS TIME. ABLE TO SIT EOB BUT CANNOT HOLD BALANCE WITHOUT HELP. WILL PROGRESS WITH TRANSFERS AS ABLE.  Requires PT Follow-Up: Yes  Activity    Strength RLE  Comment: GROSSLY +2/5  Strength RUE  Comment: GROSSLY +2/5           Bed mobility  Supine to Sit: Maximum assistance  Sit to Supine: Maximum assistance  Scooting: Maximal assistance;2 Person assistance  Bed Mobility Comments: SAT EOB ~ 5 MIN WITH MIN-MAX ASSIST. ABLE TO HOLD BALANCE BRIEFLY AFTER SET-UP, LOSES BALANCE TO THE RIGHT EASILY.           Balance  Sitting - Static: Poor  Sitting - Dynamic: Poor          OutComes Score                                                  AM-PAC - Mobility              Tinneti Score       Goals  Short Term Goals  Time Frame for Short Term Goals: 14 DAYS  Short Term Goal 1: BED MOB MIN ASSIST  Short Term Goal 2: BED TO CHAIR MOD ASSIST       Education  Patient Education  Education Given To: Patient;Family  Education Provided: Role of Therapy;Plan of Care  Barriers to Learning: None      Therapy Time   Individual Concurrent Group Co-treatment   Time In           Time Out           Minutes                   Beba Ewing, PT

## 2024-10-27 NOTE — PROGRESS NOTES
present.  The paranasal sinuses and mastoid air cells are clear.  The calvarium is intact.  Hyperostosis frontalis interna is present.  -------------------------------    1.  Critical result:  Hyperdense left middle cerebral artery, suggestive of acute thrombus. 2.  Chronic small vessel ischemic changes and atrophy.  Comment:  Findings were discussed with Dr. Galo at 10:07 a.m. on 10/25/2024. ---------------------------  All CT scans are performed using dose optimization techniques as appropriate to the performed exam and include at least one of the following: Automated exposure control, adjustment of the mA and/or kV according to size, and the use of iterative reconstruction technique.  ______________________________________ Electronically signed by: KEITH NICHOLSON M.D. Date:     10/25/2024 Time:    10:01       Lab Results   Component Value Date    WBC 7.8 10/27/2024    HGB 11.4 (L) 10/27/2024    HCT 37.3 10/27/2024    MCV 98.9 10/27/2024     10/27/2024     Lab Results   Component Value Date     10/27/2024    K 3.4 (L) 10/27/2024     10/27/2024    CO2 23 10/27/2024    BUN 18 10/27/2024    CREATININE 0.7 10/27/2024    GLUCOSE 108 (H) 10/27/2024    CALCIUM 8.6 (L) 10/27/2024    BILITOT 0.5 10/27/2024    ALKPHOS 54 10/27/2024    AST 13 10/27/2024    ALT 7 10/27/2024    LABGLOM 83 10/27/2024    GFRAA >59 11/10/2021    AGRATIO 1.8 09/07/2021    AGRATIO 1.2 09/07/2021    GLOB 2.7 06/05/2024     Lab Results   Component Value Date    INR 0.99 10/25/2024    INR 1.06 01/13/2016    PROTIME 12.8 10/25/2024    PROTIME 13.5 01/13/2016       RECORD REVIEW:   Previous medical records, medications were reviewed at today's visit.  Nursing/physician notes, imaging, labs and vitals reviewed.   PT,OT and/or speech notes reviewed         ASSESSMENT:  89 y.o. admitted with speech difficulty right-sided weakness.  CT head with a hyperdense left MCA sign.  CTA consistent with proximal branch MCA occlusion.  Severe right  ICA and moderate left ICA stenosis. CTP suggestive of left MCA ischemia.  MRI with a large left MCA stroke. Punctate right MCA infarct noted as well. ECHO negative from a stroke standpoint. Questionable history of atrial fibrillation noted in the chart.  Exam unchanged.      PLAN:   Supportive care   Follow Tele    Antiplatelet therapy, statin, permissive hypertension.    Infarct is large, hold off on anticoagulation with a fib history given risk of hemorrhagic transformation.     Medical management of carotid stenosis, given advance age and functional status would likely not be a good surgical candidate for carotid intervention.    PT, OT, ST   Consider palliative care, hospice if worsens.     Please feel free to call with any questions.   [phone] (cell phone).      Chaz Delgado DO  Board Certified Neurology

## 2024-10-27 NOTE — PLAN OF CARE
Problem: Chronic Conditions and Co-morbidities  Goal: Patient's chronic conditions and co-morbidity symptoms are monitored and maintained or improved  Outcome: Progressing  Flowsheets (Taken 10/27/2024 0835)  Care Plan - Patient's Chronic Conditions and Co-Morbidity Symptoms are Monitored and Maintained or Improved:   Monitor and assess patient's chronic conditions and comorbid symptoms for stability, deterioration, or improvement   Collaborate with multidisciplinary team to address chronic and comorbid conditions and prevent exacerbation or deterioration   Update acute care plan with appropriate goals if chronic or comorbid symptoms are exacerbated and prevent overall improvement and discharge     Problem: Discharge Planning  Goal: Discharge to home or other facility with appropriate resources  Outcome: Progressing  Flowsheets (Taken 10/27/2024 0835)  Discharge to home or other facility with appropriate resources:   Identify barriers to discharge with patient and caregiver   Arrange for needed discharge resources and transportation as appropriate   Identify discharge learning needs (meds, wound care, etc)   Arrange for interpreters to assist at discharge as needed   Refer to discharge planning if patient needs post-hospital services based on physician order or complex needs related to functional status, cognitive ability or social support system     Problem: Skin/Tissue Integrity  Goal: Absence of new skin breakdown  Description: 1.  Monitor for areas of redness and/or skin breakdown  2.  Assess vascular access sites hourly  3.  Every 4-6 hours minimum:  Change oxygen saturation probe site  4.  Every 4-6 hours:  If on nasal continuous positive airway pressure, respiratory therapy assess nares and determine need for appliance change or resting period.  Outcome: Progressing     Problem: Safety - Adult  Goal: Free from fall injury  Outcome: Progressing  Flowsheets (Taken 10/27/2024 0839)  Free From Fall Injury:

## 2024-10-27 NOTE — PROGRESS NOTES
[facility]      [facility] Note    Patient:  Saundra Finn  YOB: 1935  Date of Service: 10/27/2024  MRN: [id number]   Acct: [id number]   Primary Care Physician: Margarita Jerome MD  Advance Directive: DNR  Admit Date: 10/25/2024       Hospital Day: 2    Portions of this note have been copied forward, however, updated to reflect the most current clinical status of this patient.     CHIEF COMPLAINT strokelike    SUBJECTIVE: More alert today.  Has completed speech eval      CUMULATIVE HOSPITAL COURSE:    Saundra Finn is an 89 y.o. female.  With diagnoses listed below.  Patient also has chronic A-fib and IVC filter and she has had her aortic valve replaced.   Patient brought to the ER after altered mental status noticed by family this morning.  Patient's last known well was last p.m. before bed.  This a.m. her speech was garbled.  Patient is not on any anticoagulants for her A-fib.    ER eval electrolytes within normal limits with a glucose of 142, white count 8.8 hemoglobin 12.5 hematocrit 40.3.  Urine with 4+ bacteria large leukocyte esterase small blood.  Chest x-ray with no acute findings.  CT shows severe stenosis of right internal carotid artery and mid distal cervical and intracranial segments with moderate stenosis of the left intracranial internal carotid artery she has an occluded left MCA distal M1 segment and moderate stenosis of the right vertebral artery.  CT perfusion is admits mismatch of 55 mL .  Patient and family opted not to be transported to an interventional radiologist.  Plus,  she was outside of the window intervention.  Family requested DNR status.  Patient admitted to hospitalist with neurology consulted.  Urine culture positive for E. coli sensitive to current antibiotic.  Able to move right side more today than previously.  Speech improved          Objective:   VITALS:  BP (!) 141/57   Pulse 82   Temp 98.4 °F (36.9 °C) (Temporal)   Resp 16   Ht 1.499 m (4' 11\")   Wt  10/25/2024  EXAMINATION: COMPUTED TOMOGRAPHY PERFUSION IMAGING OF THE HEAD WITH CONTRAST.  HISTORY: Right-sided weakness.  TECHNIQUE: CT perfusion of the brain was performed with intravenous contrast using a separate data acquisition.  The data was transmitted to a separate workstation for processing by RAP[employer] software ([employer]) to produce automated calculations of the estimated cerebral blood flow and Tmax.  CT Dose Reduction Techniques Employed: Yes  COMPARISON: CT head 10/25/2024  FINDINGS:  CT PERFUSION:  Estimated ischemic core volume (rCBF < 0.3): 0 mL Estimated hypoperfusion volume (Tmax > 6 sec): 55 mL Mismatch volume: 55 mL Mismatch ratio: n/a        Elevated mean transit time in the left MCA territory corresponding to ischemia.  No core infarct.  Recommend consultation with neuro interventional service for possible catheter angiography.  Critical findings were discussed with DR. Galo by Dr. Wellington at 10:07 a.m. on 10/25/2024  All CT scans are performed using dose optimization techniques as appropriate to the performed exam and include at least one of the following: Automated exposure control, adjustment of the mA and/or kV according to size, and the use of iterative reconstruction technique.  ______________________________________ Electronically signed by: HEATHER OCAMPO D.O. Date:     10/25/2024 Time:    10:03     CT HEAD WO CONTRAST    Result Date: 10/25/2024  EXAM:  CT HEAD WITHOUT CONTRAST.  HISTORY:  Stroke-like symptoms.  COMPARISON:  None.  TECHNIQUE:  Multiple axial images of the brain were obtained from the skull base through the vertex without intravenous contrast.  Multiplanar reformats were provided.  FINDINGS:  There is no intracranial hemorrhage or extraaxial collection.  Focal high density left middle cerebral artery axial image 18, coronal image 32.  The gray-white differentiation is maintained without evidence for acute large vascular territory infarction.  Focal right cerebellar

## 2024-10-28 PROBLEM — I63.9 CVA (CEREBRAL VASCULAR ACCIDENT) (HCC): Status: ACTIVE | Noted: 2024-10-28

## 2024-10-28 LAB
ALBUMIN SERPL-MCNC: 3.2 G/DL (ref 3.5–5.2)
ALP SERPL-CCNC: 49 U/L (ref 35–104)
ALT SERPL-CCNC: 5 U/L (ref 5–33)
ANION GAP SERPL CALCULATED.3IONS-SCNC: 10 MMOL/L (ref 7–19)
AST SERPL-CCNC: 13 U/L (ref 5–32)
BASOPHILS # BLD: 0 K/UL (ref 0–0.2)
BASOPHILS NFR BLD: 0.2 % (ref 0–1)
BILIRUB SERPL-MCNC: 0.5 MG/DL (ref 0.2–1.2)
BUN SERPL-MCNC: 15 MG/DL (ref 8–23)
CALCIUM SERPL-MCNC: 8.5 MG/DL (ref 8.8–10.2)
CHLORIDE SERPL-SCNC: 109 MMOL/L (ref 98–111)
CO2 SERPL-SCNC: 24 MMOL/L (ref 22–29)
CREAT SERPL-MCNC: 0.6 MG/DL (ref 0.5–0.9)
EOSINOPHIL # BLD: 0 K/UL (ref 0–0.6)
EOSINOPHIL NFR BLD: 0.2 % (ref 0–5)
ERYTHROCYTE [DISTWIDTH] IN BLOOD BY AUTOMATED COUNT: 13.2 % (ref 11.5–14.5)
GLUCOSE BLD-MCNC: 148 MG/DL (ref 70–99)
GLUCOSE BLD-MCNC: 152 MG/DL (ref 70–99)
GLUCOSE BLD-MCNC: 157 MG/DL (ref 70–99)
GLUCOSE BLD-MCNC: 164 MG/DL (ref 70–99)
GLUCOSE SERPL-MCNC: 142 MG/DL (ref 70–99)
HCT VFR BLD AUTO: 37.3 % (ref 37–47)
HGB BLD-MCNC: 11.5 G/DL (ref 12–16)
IMM GRANULOCYTES # BLD: 0 K/UL
LYMPHOCYTES # BLD: 1.2 K/UL (ref 1.1–4.5)
LYMPHOCYTES NFR BLD: 13.4 % (ref 20–40)
MAGNESIUM SERPL-MCNC: 1.7 MG/DL (ref 1.6–2.4)
MCH RBC QN AUTO: 30.7 PG (ref 27–31)
MCHC RBC AUTO-ENTMCNC: 30.8 G/DL (ref 33–37)
MCV RBC AUTO: 99.7 FL (ref 81–99)
MONOCYTES # BLD: 0.8 K/UL (ref 0–0.9)
MONOCYTES NFR BLD: 8.4 % (ref 0–10)
NEUTROPHILS # BLD: 7.1 K/UL (ref 1.5–7.5)
NEUTS SEG NFR BLD: 77.5 % (ref 50–65)
PERFORMED ON: ABNORMAL
PLATELET # BLD AUTO: 181 K/UL (ref 130–400)
PMV BLD AUTO: 10.1 FL (ref 9.4–12.3)
POTASSIUM SERPL-SCNC: 3.1 MMOL/L (ref 3.5–5)
PROT SERPL-MCNC: 5.9 G/DL (ref 6.4–8.3)
RBC # BLD AUTO: 3.74 M/UL (ref 4.2–5.4)
SODIUM SERPL-SCNC: 143 MMOL/L (ref 136–145)
WBC # BLD AUTO: 9.1 K/UL (ref 4.8–10.8)

## 2024-10-28 PROCEDURE — 82962 GLUCOSE BLOOD TEST: CPT

## 2024-10-28 PROCEDURE — 2580000003 HC RX 258: Performed by: NURSE PRACTITIONER

## 2024-10-28 PROCEDURE — 97535 SELF CARE MNGMENT TRAINING: CPT

## 2024-10-28 PROCEDURE — 85025 COMPLETE CBC W/AUTO DIFF WBC: CPT

## 2024-10-28 PROCEDURE — 36415 COLL VENOUS BLD VENIPUNCTURE: CPT

## 2024-10-28 PROCEDURE — 6370000000 HC RX 637 (ALT 250 FOR IP): Performed by: NURSE PRACTITIONER

## 2024-10-28 PROCEDURE — 94760 N-INVAS EAR/PLS OXIMETRY 1: CPT

## 2024-10-28 PROCEDURE — 83735 ASSAY OF MAGNESIUM: CPT

## 2024-10-28 PROCEDURE — 80053 COMPREHEN METABOLIC PANEL: CPT

## 2024-10-28 PROCEDURE — 1200000000 HC SEMI PRIVATE

## 2024-10-28 PROCEDURE — 6360000002 HC RX W HCPCS: Performed by: NURSE PRACTITIONER

## 2024-10-28 PROCEDURE — 97165 OT EVAL LOW COMPLEX 30 MIN: CPT

## 2024-10-28 PROCEDURE — 99232 SBSQ HOSP IP/OBS MODERATE 35: CPT | Performed by: PSYCHIATRY & NEUROLOGY

## 2024-10-28 PROCEDURE — 97530 THERAPEUTIC ACTIVITIES: CPT

## 2024-10-28 RX ORDER — DEXTROSE MONOHYDRATE 100 MG/ML
INJECTION, SOLUTION INTRAVENOUS CONTINUOUS PRN
Status: DISCONTINUED | OUTPATIENT
Start: 2024-10-28 | End: 2024-10-31 | Stop reason: HOSPADM

## 2024-10-28 RX ORDER — INSULIN LISPRO 100 [IU]/ML
0-4 INJECTION, SOLUTION INTRAVENOUS; SUBCUTANEOUS
Status: DISCONTINUED | OUTPATIENT
Start: 2024-10-28 | End: 2024-10-31 | Stop reason: HOSPADM

## 2024-10-28 RX ADMIN — SERTRALINE HYDROCHLORIDE 25 MG: 50 TABLET ORAL at 08:28

## 2024-10-28 RX ADMIN — FUROSEMIDE 40 MG: 40 TABLET ORAL at 08:27

## 2024-10-28 RX ADMIN — POTASSIUM CHLORIDE 10 MEQ: 7.46 INJECTION, SOLUTION INTRAVENOUS at 05:46

## 2024-10-28 RX ADMIN — GABAPENTIN 100 MG: 100 CAPSULE ORAL at 12:55

## 2024-10-28 RX ADMIN — POTASSIUM CHLORIDE 10 MEQ: 7.46 INJECTION, SOLUTION INTRAVENOUS at 06:37

## 2024-10-28 RX ADMIN — ATORVASTATIN CALCIUM 80 MG: 80 TABLET, FILM COATED ORAL at 21:10

## 2024-10-28 RX ADMIN — SODIUM CHLORIDE, SODIUM LACTATE, POTASSIUM CHLORIDE, CALCIUM CHLORIDE AND DEXTROSE MONOHYDRATE: 5; 600; 310; 30; 20 INJECTION, SOLUTION INTRAVENOUS at 20:05

## 2024-10-28 RX ADMIN — GABAPENTIN 200 MG: 100 CAPSULE ORAL at 08:27

## 2024-10-28 RX ADMIN — Medication 1 TABLET: at 08:28

## 2024-10-28 RX ADMIN — POTASSIUM CHLORIDE 10 MEQ: 7.46 INJECTION, SOLUTION INTRAVENOUS at 07:41

## 2024-10-28 RX ADMIN — POTASSIUM CHLORIDE 10 MEQ: 7.46 INJECTION, SOLUTION INTRAVENOUS at 04:45

## 2024-10-28 RX ADMIN — GABAPENTIN 200 MG: 100 CAPSULE ORAL at 21:10

## 2024-10-28 RX ADMIN — NIACIN 500 MG: 500 TABLET, EXTENDED RELEASE ORAL at 21:10

## 2024-10-28 NOTE — PROGRESS NOTES
10/28/24 1052   Encounter Summary   Encounter Overview/Reason Initial Encounter;Spiritual/Emotional Needs;Loneliness/Social Isolation   Encounter Code  Assessment by  services   Service Provided For Family   Referral/Consult From Palliative Care   Support System Family members   Complexity of Encounter Moderate   Begin Time 0950   End Time  1020   Total Time Calculated 30 min   Spiritual/Emotional needs   Type Spiritual Support   Grief, Loss, and Adjustments   Type Adjustment to illness;Life Adjustments   Palliative Care   Type Palliative Care, Initial/Spiritual Assessment   Assessment/Intervention/Outcome   Assessment Coping;Hopeful  (Pt was sleeping.)   Intervention Discussed belief system/Rastafari practices/tricia;Discussed death, afterlife;Prayer (assurance of)/[place];Sustaining Presence/Ministry of presence   Outcome Acceptance;Expressed Gratitude;Receptive   Plan and Referrals   Plan/Referrals Continue to visit, (comment);Continue Support (comment)   Does the patient have a [facility] PCP? Yes    to follow up after discharge? No         Palliative Care initiation: This  visited with pt to initiate palliative care and provide spiritual care. Pt was sleeping but two of her sons were in the room and this  visited with them. 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 Pt is known to palliative care and providers have been consulted to follow pt.         Advance Directives: Pt has a LW but it does not name her decision makers. Pt's sons say pt has a new LW and this  requested a copy of the document. They also says pt's sister Yue Blackwell is her primary decision maker. Pt is DNR and does not want CPR or Ventilator. SEE ACP NOTE.         Pain/other symptoms: Pt's sons say she has burning in her feet and she is receiving medication for it.       Spiritual: Primitive Sabianism.        Pt/family discussion r/t goals: Pt lives at home and her son

## 2024-10-28 NOTE — PROGRESS NOTES
high density left middle cerebral artery axial image 18, coronal image 32.  The gray-white differentiation is maintained without evidence for acute large vascular territory infarction.  Focal right cerebellar and right basal ganglia encephalomalacia noted.  There are areas of periventricular and subcortical white matter low attenuation.  The cortical sulci and cerebral ventricles are symmetrically enlarged.  The basal cisterns are well visualized.  There is no hydrocephalus, mass effect, or midline shift.  Atherosclerotic calcifications are present.  The paranasal sinuses and mastoid air cells are clear.  The calvarium is intact.  Hyperostosis frontalis interna is present.  -------------------------------    1.  Critical result:  Hyperdense left middle cerebral artery, suggestive of acute thrombus. 2.  Chronic small vessel ischemic changes and atrophy.  Comment:  Findings were discussed with Dr. Galo at 10:07 a.m. on 10/25/2024. ---------------------------  All CT scans are performed using dose optimization techniques as appropriate to the performed exam and include at least one of the following: Automated exposure control, adjustment of the mA and/or kV according to size, and the use of iterative reconstruction technique.  ______________________________________ Electronically signed by: KEITH NICHOLSON M.D. Date:     10/25/2024 Time:    10:01       Culture Results:    No results for input(s): \"CXSURG\" in the last 720 hours.    Blood Culture Recent: No results for input(s): \"BC\" in the last 720 hours.    No results for input(s): \"BC\", \"BLOODCULT2\", \"ORG\" in the last 72 hours.    Assessment/Plan:   CVA (cerebral vascular accident) (HCC)  -CT with severe stenosis of right internal carotid artery, occluded left MCA distal M1 segment, moderate stenosis of right vertebral artery  -MRI with a large left MCA stroke  -Holding off on a/c given high risk of hemorrhagic transformation  -Medical management of carotid stenosis,

## 2024-10-28 NOTE — CASE COMMUNICATION
I had just visited with Ms. Finn in her home, along with her children and siblings last week to discuss resources and complete advance directives as part of the outpatient palliative program ([facility]).  I made a courtesy visit today to check on her after her daughter in law, Laurita, had called me to notify me that Saundra had a stroke and is in room 531.    Her dtr Berenice  (From [place]) is with her and she has just finished with PT.  It does appear that they are seeking placement at a SNF.    Saundra answers questions with short answers but appropriately.  She understands what is happening.  They voice no needs.  I offered support and will update [facility] on her status.

## 2024-10-28 NOTE — PROGRESS NOTES
[facility] Neurology Progress Note      Patient:   Saundra Finn  MR#:    [id number]   Room:    31/531-01   YOB: 1935  Date of Progress Note: 10/28/2024  Time of Note                           8:23 AM  Consulting Physician:  Chaz Delgado DO  Attending Physician:  Abraham Monet MD      INTERVAL HISTORY:  No acute events, speech, right sided weakness is about the same.     REVIEW OF SYSTEMS:  Limited given speech difficulty     PHYSICAL EXAM:    Constitutional -   /79   Pulse 85   Temp 97 °F (36.1 °C) (Temporal)   Resp 18   Ht 1.499 m (4' 11\")   Wt 50.3 kg (111 lb)   SpO2 98%   BMI 22.42 kg/m²   General appearance: No acute distress   EYES -   Conjunctiva normal  Pupillary exam as below, see CN exam in the neurologic exam  ENT-    No scars, masses, or lesions over external nose or ears  Oropharynx without erythema, palate midline  Cardiovascular -   No clubbing, cyanosis, or edema   Pulmonary-   Good expansion, normal effort without use of accessory muscles  Musculoskeletal -   No significant wasting of muscles noted  Gait as below, see gait exam in the neurologic exam  Muscle strength, tone, stability as below see the motor exam in the neurologic exam.   No bony deformities  Skin -   Warm, dry, and intact to inspection and palpation.    No rash, erythema, or pallor        NEUROLOGICAL EXAM     Mental status    [x] Awake, alert  [x] Affect attention and concentration appear appropriate  [] Recent and remote memory appears unremarkable  [] Speech normal without dysarthria or aphasia, comprehension and repetition intact.   COMMENTS:Awake, following some commands, speech difficulty noted - improving    Cranial Nerves [] No VF deficit to confrontation,  optic discs normal, no papilledema on fundoscopic exam.  [] PERRLA, EOMI, no nystagmus, conjugate eye movements, no ptosis  [] Face symmetric  [] Facial sensation intact  [] Tongue midline no atrophy or fasciculations present  [] Palate

## 2024-10-28 NOTE — PROGRESS NOTES
Comprehensive Nutrition Assessment    Type and Reason for Visit:  Reassess    Nutrition Recommendations/Plan:   Modify ONS order to Magic Cup BID     Malnutrition Assessment:  Malnutrition Status:  Severe malnutrition (10/26/24 0858)    Context:  Chronic Illness     Findings of the 6 clinical characteristics of malnutrition:  Energy Intake:  75% or less estimated energy requirements for 1 month or longer  Weight Loss:  Greater than 7.5% over 3 months     Body Fat Loss:  Severe body fat loss Orbital, Buccal region   Muscle Mass Loss:  Severe muscle mass loss Temples (temporalis), Clavicles (pectoralis & deltoids)  Fluid Accumulation:  Mild Extremities   Strength:  Not Performed    Nutrition Assessment:    Pt has advanced to an oral diet. Will modify ONS orders to Magic Cup BID to reflect plan that was discussed with pt's son. Last BM noted 10/27. Will continue to monitor and implement further nutrition intervention as needed.    Current Nutrition Intake & Therapies:    ADULT DIET; Dysphagia - Pureed; Mildly Thick (Nectar)  ADULT ORAL NUTRITION SUPPLEMENT; Breakfast, Lunch, Dinner; Fortified Gelatin Oral Supplement  ADULT ORAL NUTRITION SUPPLEMENT; Breakfast, Lunch, Dinner; Fortified Gelatin Oral Supplement    Anthropometric Measures:  Height: 149.9 cm (4' 11\")  Ideal Body Weight (IBW): 95 lbs (43 kg)    Current Body Weight: 50.3 kg (110 lb 14.3 oz), 107.4 % IBW.    Current BMI (kg/m2): 22.4  Usual Body Weight: 50.8 kg (112 lb) (8/7/24)  % Weight Change (Calculated): -8.9  BMI Categories: Underweight (BMI less than 22) age over 65    Estimated Daily Nutrient Needs:  Energy Requirements Based On: Kcal/kg  Weight Used for Energy Requirements: Current  Energy (kcal/day): 3989-1683 kcals/day  Weight Used for Protein Requirements: Current  Protein (g/day):  g/protein/day  Method Used for Fluid Requirements: 1 ml/kcal  Fluid (ml/day): 7778-8375 mL/day    Nutrition Diagnosis:   Severe malnutrition related to

## 2024-10-28 NOTE — PROGRESS NOTES
Physical Therapy    Name: Saundra Finn  MRN: [id number]  Date of service: 10/28/2024       10/28/24 1400   Restrictions/Precautions   Restrictions/Precautions Fall Risk;Modified Diet   General   Diagnosis CVA R WEAKNESS   General Comment   Comments Pt in bed, OT present   Subjective   Subjective agreed to therapy   Subjective   Pain no complaints   Cognition   Cognition Comment APHASIA/DYSARTHRIA, FOLLOWS COMMANDS   Bed mobility   Supine to Sit Maximum assistance   Sit to Supine Maximum assistance   Bed Mobility Comments sat EOB for 10-15 minutes   Transfers   Sit to Stand Moderate Assistance;Maximum Assistance   Stand to Sit Moderate Assistance;Maximum Assistance   Comment attempted to stand 2 times. Mod/max for first stand, second stand allowed pt to assist and pt only able to lift buttock off bed   Short Term Goals   Time Frame for Short Term Goals 14 DAYS   Short Term Goal 1 BED MOB MIN ASSIST   Short Term Goal 2 BED TO CHAIR MOD ASSIST   Activity Tolerance   Activity Tolerance Patient tolerated treatment well   Assessment   Assessment Pt requires assist for all mobility and did at home as well. Sat EOB for extended time requiring constant cueing for balance. Attempted to stand 2x at EOB, then returned to supine. Pt sat up in bed with family present and pallative care present also. Left with all needs in reach   Discharge Recommendations Continue to assess pending progress;Patient would benefit from continued therapy after discharge   Physical Therapy Plan   General Plan 5-7 times per week   Current Treatment Recommendations Strengthening;ROM;Balance training;Functional mobility training;Transfer training;Safety education & training;Patient/Caregiver education & training;Endurance training   PT Plan of Care   Monday X   Safety Devices   Type of Devices Left in bed;Bed alarm in place;Call light within reach  (pallative care present and a daughter)   PT Whiteboard Notes   Therapy Whiteboard RE 11/10  CVA R weakness

## 2024-10-28 NOTE — PROGRESS NOTES
Occupational Therapy Initial Assessment  Date: 10/28/2024   Patient Name: Saundra Finn  MRN: [id number]     : 1935    Date of Service: 10/28/2024    Discharge Recommendations:  Patient would benefit from continued therapy after discharge       Assessment   Assessment: Evaluation completed and tx initiated.  The patient would benefit from further skilled therapy to upgrade safety and functional independence.  Treatment Diagnosis: Large L MCA stroke  REQUIRES OT FOLLOW-UP: Yes  Activity Tolerance  Activity Tolerance: Patient Tolerated treatment well           Patient Diagnosis(es): The primary encounter diagnosis was Cerebrovascular accident (CVA), unspecified mechanism (HCC). Diagnoses of Acute right-sided weakness, Cerebral artery occlusion, and Acute CVA (cerebrovascular accident) (HCC) were also pertinent to this visit.   has a past medical history of A-fib (HCC), Breast cancer (HCC), Cancer (HCC), Chest pain, Diabetes mellitus (HCC), Diaphoresis, Family history of early CAD, Gastroesophageal reflux disease, H/O bicuspid aortic valve, History of blood transfusion, History of phlebitis, Hx of blood clots, Hyperlipidemia, Hypertension, Moderate tricuspid regurgitation, Osteoarthritis, Palliative care patient, Stroke (HCC), TIA (transient ischemic attack), and Valvular heart disease.   has a past surgical history that includes Total knee arthroplasty; Diagnostic Cardiac Cath Lab Procedure (); Cataract removal; Tonsillectomy and adenoidectomy; Cholecystectomy; AV fistula repair; Aortic valve replacement (2006); Colonoscopy; Appendectomy; eye surgery; joint replacement; Cardioversion (2016); Breast surgery (); Mastectomy (Left, ); Hysterectomy, total abdominal (); and Breast biopsy (Left, ).    Treatment Diagnosis: Large L MCA stroke      Restrictions  Restrictions/Precautions  Restrictions/Precautions: Fall Risk, Modified Diet    Subjective   General  Chart Reviewed: Yes  Patient

## 2024-10-28 NOTE — ACP (ADVANCE CARE PLANNING)
Advance Care Planning     Advance Care Planning Inpatient Note  [facility] Department    Today's Date: 10/28/2024  Unit: [facility] SURG SERVICES    Received request from Other: Palliative care .  Upon review of chart and communication with care team, Pt's sons were present in the room and pt was asleep.     Goals of ACP Conversation:  Discuss advance care planning documents    Health Care Decision Makers:       Primary Decision Maker: Tim Santillan - Child - [phone]    Primary Decision Maker: Yue Blackwell - Brother/Sister - [phone]    Secondary Decision Maker: radha santillan - Child - [phone]    Secondary Decision Maker: jhony santillan - Child - [phone]    Secondary Decision Maker: Peesylvain - Child - [phone]  Summary:  Verified Documents    Advance Care Planning Documents (Patient Wishes):  Living Will/Advance Directive     Assessment:  Pt was asleep but her two sons visited with this  and engaged in conversation. They say she had a \"large stroke.\" They also said she named her sister Yue Blackwell as her primary decision maker. Pt's sons say their goal is to keep her comfortable.     Interventions:  Confirmed LW, requested a copy of the new LW, and confirmed code status.    Care Preferences Communicated:     Hospitalization:  If the patient's health worsens and it becomes clear that the chance of recovery is unlikely,     the patient wants hospitalization.    Ventilation:   If the patient, in their present state of health, suddenly became very ill and unable to breathe on their own,     the patient would NOT desire the use of a ventilator (breathing machine).    If their health worsens and it becomes clear that the change of recovery is unlikely,     the patient would NOT desire the use of a ventilator (breathing machine).    Resuscitation:  In the event the patient's heart stopped as a result of an underlying serious health condition, the patient communicates a preference for      a  natural death (no CPR).    Outcomes/Plan:  ACP Discussion: Completed    Electronically signed by Mary Behrens, Chaplain on 10/28/2024 at 11:06 AM

## 2024-10-28 NOTE — PLAN OF CARE
Problem: Chronic Conditions and Co-morbidities  Goal: Patient's chronic conditions and co-morbidity symptoms are monitored and maintained or improved  10/28/2024 0024 by Josefa Zhong RN  Outcome: Progressing  Flowsheets (Taken 10/27/2024 2150)  Care Plan - Patient's Chronic Conditions and Co-Morbidity Symptoms are Monitored and Maintained or Improved: Monitor and assess patient's chronic conditions and comorbid symptoms for stability, deterioration, or improvement  10/27/2024 1440 by Layne Vargas RN  Outcome: Progressing  Flowsheets (Taken 10/27/2024 0835)  Care Plan - Patient's Chronic Conditions and Co-Morbidity Symptoms are Monitored and Maintained or Improved:   Monitor and assess patient's chronic conditions and comorbid symptoms for stability, deterioration, or improvement   Collaborate with multidisciplinary team to address chronic and comorbid conditions and prevent exacerbation or deterioration   Update acute care plan with appropriate goals if chronic or comorbid symptoms are exacerbated and prevent overall improvement and discharge     Problem: Discharge Planning  Goal: Discharge to home or other facility with appropriate resources  10/28/2024 0024 by Josefa Zhong, RN  Outcome: Progressing  Flowsheets (Taken 10/27/2024 2150)  Discharge to home or other facility with appropriate resources: Identify barriers to discharge with patient and caregiver  10/27/2024 1440 by Layne Vargas RN  Outcome: Progressing  Flowsheets (Taken 10/27/2024 0835)  Discharge to home or other facility with appropriate resources:   Identify barriers to discharge with patient and caregiver   Arrange for needed discharge resources and transportation as appropriate   Identify discharge learning needs (meds, wound care, etc)   Arrange for interpreters to assist at discharge as needed   Refer to discharge planning if patient needs post-hospital services based on physician order or complex needs related to functional status,

## 2024-10-28 NOTE — PLAN OF CARE
Problem: Chronic Conditions and Co-morbidities  Goal: Patient's chronic conditions and co-morbidity symptoms are monitored and maintained or improved  Outcome: Progressing  Flowsheets (Taken 10/28/2024 1020)  Care Plan - Patient's Chronic Conditions and Co-Morbidity Symptoms are Monitored and Maintained or Improved: Monitor and assess patient's chronic conditions and comorbid symptoms for stability, deterioration, or improvement     Problem: Discharge Planning  Goal: Discharge to home or other facility with appropriate resources  Outcome: Progressing  Flowsheets (Taken 10/28/2024 1020)  Discharge to home or other facility with appropriate resources:   Identify barriers to discharge with patient and caregiver   Arrange for needed discharge resources and transportation as appropriate   Identify discharge learning needs (meds, wound care, etc)     Problem: Skin/Tissue Integrity  Goal: Absence of new skin breakdown  Description: 1.  Monitor for areas of redness and/or skin breakdown  2.  Assess vascular access sites hourly  3.  Every 4-6 hours minimum:  Change oxygen saturation probe site  4.  Every 4-6 hours:  If on nasal continuous positive airway pressure, respiratory therapy assess nares and determine need for appliance change or resting period.  Outcome: Progressing     Problem: Safety - Adult  Goal: Free from fall injury  Outcome: Progressing  Flowsheets (Taken 10/28/2024 1026)  Free From Fall Injury:   Instruct family/caregiver on patient safety   Based on caregiver fall risk screen, instruct family/caregiver to ask for assistance with transferring infant if caregiver noted to have fall risk factors     Problem: ABCDS Injury Assessment  Goal: Absence of physical injury  Outcome: Progressing  Flowsheets (Taken 10/28/2024 1026)  Absence of Physical Injury: Implement safety measures based on patient assessment     Problem: Neurosensory - Adult  Goal: Achieves stable or improved neurological status  Outcome:  ordered medications to maintain glucose within target range     Problem: Nutrition Deficit:  Goal: Optimize nutritional status  Outcome: Progressing  Flowsheets (Taken 10/28/2024 0732 by Crista Luna, MS, RD, LD)  Nutrient intake appropriate for improving, restoring, or maintaining nutritional needs: Monitor oral intake, labs, and treatment plans     Problem: Pain  Goal: Verbalizes/displays adequate comfort level or baseline comfort level  Outcome: Progressing

## 2024-10-28 NOTE — CARE COORDINATION
Pts sister wants a referral being sent to [facility] and [facility] for long term care. Awaiting acceptance/denial.  Does not have a female long term bed.  [facility]   P   F ()   F (Ref to Cyndie Pabon)    [facility]   P   F    Electronically signed by Amina Scales on 10/28/2024 at 12:50 PM

## 2024-10-29 PROBLEM — Z51.5 PALLIATIVE CARE PATIENT: Status: ACTIVE | Noted: 2024-10-29

## 2024-10-29 LAB
ALBUMIN SERPL-MCNC: 3 G/DL (ref 3.5–5.2)
ALP SERPL-CCNC: 47 U/L (ref 35–104)
ALT SERPL-CCNC: <5 U/L (ref 5–33)
ANION GAP SERPL CALCULATED.3IONS-SCNC: 10 MMOL/L (ref 7–19)
AST SERPL-CCNC: 10 U/L (ref 5–32)
BASOPHILS # BLD: 0 K/UL (ref 0–0.2)
BASOPHILS NFR BLD: 0.1 % (ref 0–1)
BILIRUB SERPL-MCNC: 0.6 MG/DL (ref 0.2–1.2)
BUN SERPL-MCNC: 10 MG/DL (ref 8–23)
CALCIUM SERPL-MCNC: 8.3 MG/DL (ref 8.8–10.2)
CHLORIDE SERPL-SCNC: 108 MMOL/L (ref 98–111)
CO2 SERPL-SCNC: 27 MMOL/L (ref 22–29)
CREAT SERPL-MCNC: 0.6 MG/DL (ref 0.5–0.9)
EKG P AXIS: NORMAL DEGREES
EKG P-R INTERVAL: NORMAL MS
EKG Q-T INTERVAL: 376 MS
EKG QRS DURATION: 116 MS
EKG QTC CALCULATION (BAZETT): 428 MS
EKG T AXIS: -141 DEGREES
EOSINOPHIL # BLD: 0 K/UL (ref 0–0.6)
EOSINOPHIL NFR BLD: 0.3 % (ref 0–5)
ERYTHROCYTE [DISTWIDTH] IN BLOOD BY AUTOMATED COUNT: 13.2 % (ref 11.5–14.5)
GLUCOSE BLD-MCNC: 135 MG/DL (ref 70–99)
GLUCOSE BLD-MCNC: 143 MG/DL (ref 70–99)
GLUCOSE BLD-MCNC: 146 MG/DL (ref 70–99)
GLUCOSE BLD-MCNC: 154 MG/DL (ref 70–99)
GLUCOSE SERPL-MCNC: 141 MG/DL (ref 70–99)
HCT VFR BLD AUTO: 36.6 % (ref 37–47)
HGB BLD-MCNC: 11.1 G/DL (ref 12–16)
IMM GRANULOCYTES # BLD: 0 K/UL
LYMPHOCYTES # BLD: 1.3 K/UL (ref 1.1–4.5)
LYMPHOCYTES NFR BLD: 15 % (ref 20–40)
MAGNESIUM SERPL-MCNC: 1.4 MG/DL (ref 1.6–2.4)
MCH RBC QN AUTO: 30.2 PG (ref 27–31)
MCHC RBC AUTO-ENTMCNC: 30.3 G/DL (ref 33–37)
MCV RBC AUTO: 99.5 FL (ref 81–99)
MONOCYTES # BLD: 0.7 K/UL (ref 0–0.9)
MONOCYTES NFR BLD: 8.4 % (ref 0–10)
NEUTROPHILS # BLD: 6.6 K/UL (ref 1.5–7.5)
NEUTS SEG NFR BLD: 75.7 % (ref 50–65)
PERFORMED ON: ABNORMAL
PLATELET # BLD AUTO: 158 K/UL (ref 130–400)
PMV BLD AUTO: 10.3 FL (ref 9.4–12.3)
POTASSIUM SERPL-SCNC: 3.3 MMOL/L (ref 3.5–5)
PROT SERPL-MCNC: 5.4 G/DL (ref 6.4–8.3)
RBC # BLD AUTO: 3.68 M/UL (ref 4.2–5.4)
SODIUM SERPL-SCNC: 145 MMOL/L (ref 136–145)
WBC # BLD AUTO: 8.7 K/UL (ref 4.8–10.8)

## 2024-10-29 PROCEDURE — 85025 COMPLETE CBC W/AUTO DIFF WBC: CPT

## 2024-10-29 PROCEDURE — 99222 1ST HOSP IP/OBS MODERATE 55: CPT | Performed by: PHYSICIAN ASSISTANT

## 2024-10-29 PROCEDURE — 97530 THERAPEUTIC ACTIVITIES: CPT

## 2024-10-29 PROCEDURE — 99232 SBSQ HOSP IP/OBS MODERATE 35: CPT | Performed by: PSYCHIATRY & NEUROLOGY

## 2024-10-29 PROCEDURE — 80053 COMPREHEN METABOLIC PANEL: CPT

## 2024-10-29 PROCEDURE — 83735 ASSAY OF MAGNESIUM: CPT

## 2024-10-29 PROCEDURE — 2580000003 HC RX 258: Performed by: NURSE PRACTITIONER

## 2024-10-29 PROCEDURE — 97535 SELF CARE MNGMENT TRAINING: CPT

## 2024-10-29 PROCEDURE — 36415 COLL VENOUS BLD VENIPUNCTURE: CPT

## 2024-10-29 PROCEDURE — 94760 N-INVAS EAR/PLS OXIMETRY 1: CPT

## 2024-10-29 PROCEDURE — 6360000002 HC RX W HCPCS: Performed by: NURSE PRACTITIONER

## 2024-10-29 PROCEDURE — 82962 GLUCOSE BLOOD TEST: CPT

## 2024-10-29 PROCEDURE — 1200000000 HC SEMI PRIVATE

## 2024-10-29 PROCEDURE — 6370000000 HC RX 637 (ALT 250 FOR IP)

## 2024-10-29 PROCEDURE — 6370000000 HC RX 637 (ALT 250 FOR IP): Performed by: NURSE PRACTITIONER

## 2024-10-29 RX ORDER — POTASSIUM CHLORIDE 1500 MG/1
40 TABLET, EXTENDED RELEASE ORAL PRN
Status: DISCONTINUED | OUTPATIENT
Start: 2024-10-29 | End: 2024-10-31 | Stop reason: HOSPADM

## 2024-10-29 RX ORDER — POTASSIUM CHLORIDE 7.45 MG/ML
10 INJECTION INTRAVENOUS PRN
Status: DISCONTINUED | OUTPATIENT
Start: 2024-10-29 | End: 2024-10-31 | Stop reason: HOSPADM

## 2024-10-29 RX ORDER — CLOPIDOGREL BISULFATE 75 MG/1
75 TABLET ORAL DAILY
Status: DISCONTINUED | OUTPATIENT
Start: 2024-10-29 | End: 2024-10-31 | Stop reason: HOSPADM

## 2024-10-29 RX ADMIN — SERTRALINE HYDROCHLORIDE 25 MG: 50 TABLET ORAL at 10:29

## 2024-10-29 RX ADMIN — POTASSIUM CHLORIDE 10 MEQ: 7.46 INJECTION, SOLUTION INTRAVENOUS at 09:35

## 2024-10-29 RX ADMIN — GABAPENTIN 100 MG: 100 CAPSULE ORAL at 14:58

## 2024-10-29 RX ADMIN — POTASSIUM CHLORIDE 10 MEQ: 7.46 INJECTION, SOLUTION INTRAVENOUS at 08:20

## 2024-10-29 RX ADMIN — ATORVASTATIN CALCIUM 80 MG: 80 TABLET, FILM COATED ORAL at 21:45

## 2024-10-29 RX ADMIN — SODIUM CHLORIDE, SODIUM LACTATE, POTASSIUM CHLORIDE, CALCIUM CHLORIDE AND DEXTROSE MONOHYDRATE: 5; 600; 310; 30; 20 INJECTION, SOLUTION INTRAVENOUS at 23:37

## 2024-10-29 RX ADMIN — POTASSIUM CHLORIDE 10 MEQ: 7.46 INJECTION, SOLUTION INTRAVENOUS at 10:39

## 2024-10-29 RX ADMIN — Medication 1 TABLET: at 10:28

## 2024-10-29 RX ADMIN — POTASSIUM CHLORIDE 10 MEQ: 7.46 INJECTION, SOLUTION INTRAVENOUS at 12:46

## 2024-10-29 RX ADMIN — PANTOPRAZOLE SODIUM 40 MG: 40 TABLET, DELAYED RELEASE ORAL at 10:28

## 2024-10-29 RX ADMIN — NIACIN 500 MG: 500 TABLET, EXTENDED RELEASE ORAL at 21:45

## 2024-10-29 RX ADMIN — CLOPIDOGREL BISULFATE 75 MG: 75 TABLET ORAL at 14:58

## 2024-10-29 RX ADMIN — GABAPENTIN 200 MG: 100 CAPSULE ORAL at 21:45

## 2024-10-29 RX ADMIN — GABAPENTIN 200 MG: 100 CAPSULE ORAL at 10:28

## 2024-10-29 NOTE — CARE COORDINATION
Pts sister accepted the bed at [facility]. Precert started.   [facility]   P   F  Electronically signed by Amina Scales on 10/29/2024 at 9:11 AM

## 2024-10-29 NOTE — CONSULTS
mouth nightly For cholesterol 10/22/24 10/22/25  Margarita Jerome MD   sertraline (ZOLOFT) 25 MG tablet TAKE 1 TABLET BY MOUTH DAILY 10/9/24   Margarita Jerome MD   niacin 500 MG extended release capsule Take 1 capsule by mouth nightly    Jam Foley MD   potassium chloride (KLOR-CON M) 20 MEQ extended release tablet Take 2 tablets by mouth daily Indications: For low potassium  Patient taking differently: Take 1 tablet by mouth daily Indications: For low potassium 10/7/24 11/6/24  Margarita Jerome MD   gabapentin (NEURONTIN) 100 MG capsule Take 2 tablets by mouth in the morning 1 tablet at lunch and 2 tablets at bedtime for peripheral neuropathy intended supply: 30 days 9/3/24 11/3/24  Margarita Jerome MD   aspirin 325 MG tablet Take 1 tablet by mouth as needed for Pain    Jam Foley MD   omeprazole (PRILOSEC) 40 MG delayed release capsule TAKE 1 CAPSULE BY MOUTH DAILY FOR STOMACH 3/13/24   Margarita Jerome MD   furosemide (LASIX) 40 MG tablet Take 1 tablet by mouth three times a week    ProviderJam MD   Calcium Carbonate-Vitamin D (OYSTER SHELL CALCIUM/D) 500-200 MG-UNIT TABS Take 1 tablet by mouth daily  Patient not taking: Reported on 10/25/2024 1/27/21 9/3/24  Jame Bacon, ETHAN   denosumab (PROLIA) 60 MG/ML SOLN SC injection Inject 1 mL into the skin every 6 months  Patient not taking: Reported on 10/25/2024    ProviderJam MD       Allergies:    Penicillins and Sulfa antibiotics    Social History:    The patient currently lives at home but she and family are anticipating placement   Tobacco:   reports that she has never smoked. She has never used smokeless tobacco.  Alcohol:   reports no history of alcohol use.  Illicit Drugs: none     Family History:      Problem Relation Age of Onset    Arthritis Mother     Heart Disease Mother     High Blood Pressure Mother     Heart Disease Father        Review of Systems:   + right sided weakness but there is reported improvement +  about completing POA documentation while she is here. This was something they have discussed with outpatient palliative social work. When patient awake, I do feel she has capacity to answer questions appropriately. I've asked them for a copy of her most recent living will. If she does want to complete POA paperwork I will see if Ludmila or  Anabelle can assist w/ notarizing it. Opportunity for questions provided.     Palliative team will follow as needed.    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      CounselingTopics: Goals of care, Code Status, Disease process education, pt/family support                                     Total Time Spent with patient assessment, interview of independent historian/HCS, workup/treatment review, discussion with medical team, review of current and home medications, review of care everywhere and placement of orders/preparation of this note: 60 minutes    Electronically signed by Beba Abbott PA-C on 10/29/2024 at 8:52 AM    (Please note that portions of this note were completed with a voice recognition program.  Efforts were made to edit the dictations but occasionally words are mis-transcribed.)

## 2024-10-29 NOTE — PROGRESS NOTES
Physical Therapy  Name: Saundra Finn  MRN:  [id number]  Date of service:  10/29/2024     10/29/24 1525   Restrictions/Precautions   Restrictions/Precautions Fall Risk;Modified Diet   Subjective   Subjective Pt agreeable to therapy.   Pain Assessment   Pain Assessment None - Denies Pain   Bed Mobility   Rolling Maximal assistance  (rolled L/R multiple times for assist with cleanup)   Supine to Sit Maximal assistance  (x2)   Sit to Supine Maximal assistance  (x2)   Comment pt sat EOB several minutes requiring assist for balance majority of the time, able to maintain balance briefly CGA with cueing for keeping anterior lean and head up   Transfers   Sit to Stand Unable to assess   Stand to Sit Unable to assess   Other Activities   Comment worked on positioning LE's to accept weight when pt leaning fwd to help with balance   Short Term Goals   Time Frame for Short Term Goals 14 DAYS   Short Term Goal 1 BED MOB MIN ASSIST   Short Term Goal 2 BED TO CHAIR MOD ASSIST   Conditions Requiring Skilled Therapeutic Intervention   Body Structures, Functions, Activity Limitations Requiring Skilled Therapeutic Intervention Decreased functional mobility ;Decreased ADL status;Decreased endurance;Decreased ROM;Decreased balance;Decreased posture   Assessment Pt able to maintain sitting balance only briefly without assist. Worked on posture, keeping head up and finding midline balance in sitting. Pt fatigued but tolerated fairly well. Pt in sidelying with pillows to support.   Activity Tolerance   Activity Tolerance Patient tolerated treatment well   PT Plan of Care   Tuesday X   Safety Devices   Type of Devices Bed alarm in place;Call light within reach;Left in bed           Electronically signed by Marjan Henderson PTA on 10/29/2024 at 3:30 PM

## 2024-10-29 NOTE — PROGRESS NOTES
[facility]    Progress Note    Patient:  Saundra Finn  YOB: 1935  Date of Service: 10/29/2024  MRN: [id number]   Acct: [id number]   Primary Care Physician: Margarita Jerome MD  Advance Directive: DNR  Admit Date: 10/25/2024       Hospital Day: 4    Portions of this note have been copied forward, however, updated to reflect the most current clinical status of this patient.     CHIEF COMPLAINT: stroke-like symptoms    SUBJECTIVE: More alert today, no complaints    CUMULATIVE HOSPITAL COURSE:     This patient is an 89-year-old female with multiple comorbidities including atrial fibrillation on Eliquis, IVC filter placement, remote AVR, hyperlipidemia, hypertension, T2DM, remote history of breast cancer, MONAE, CKD stage IIIa, TIA who presented to [facility] ED on 10/25 from her home with family for evaluation of AMS, found to have garbled speech and right sided weakness that morning by family, last known well was the night before. Of note, family reports that patient has had worsening diarrhea over the last several months, in addition to dark/tarry stools, was taken off of Eliquis 3 to 4 weeks ago with plans for outpatient GI follow-up due to a positive Hemoccult at PCP office.  In ER, evidence of UTI with 4+ bacteria and large LE, lab work overall stable, chest x-ray negative, CT with severe stenosis of right internal carotid artery, occluded left MCA distal M1 segment, moderate stenosis of right vertebral artery.  In ER, family opted to make patient DNR.  She was admitted to hospitalist with consult to neurology.  Her MRI indicates a large area of acute infarction in the left MCA territory, punctate focus of acute infarction right MCA territory, and remote infarctions in the right basal ganglia and bilateral cerebellum.  Holding off on anticoagulation currently given high risk of hemorrhagic transformation.  Urine culture was positive for E. coli, received Rocephin.  PT/OT is following.  Speech still

## 2024-10-29 NOTE — PROGRESS NOTES
Occupational Therapy     10/29/24 1525   Subjective   Subjective Pt in bed upon arrival for therapy. Pt agreeable to participate. Family member present (possibly son).   Pain Assessment   Pain Assessment None - Denies Pain   Cognition   Overall Cognitive Status WFL   Orientation   Overall Orientation Status WFL   Orientation Level Oriented to person   Bed Mobility Training   Bed Mobility Training Yes   Overall Level of Assistance Maximum assistance;Assist X2   Interventions Verbal cues;Tactile cues;Manual cues   Rolling Maximum assistance;Assist X2   Supine to Sit Maximum assistance;Assist X2   Sit to Supine Maximum assistance;Assist X2   Scooting Maximum assistance;Assist X2   Transfer Training   Transfer Training No   Balance   Sitting Impaired   Sitting - Static Poor (constant support)   Sitting - Dynamic Poor (constant support)   Standing Impaired   Standing - Static Not tested   Standing - Dynamic Not tested   ADL   Feeding Setup;Maximum assistance   Grooming Setup;Maximum assistance   UE Bathing Maximum assistance   LE Bathing Maximum assistance   UE Dressing Maximum assistance   LE Dressing Maximum assistance   Toileting Maximum assistance   Functional Mobility Maximum assistance;Dependent/Total   Additional Comments Very limited by fatigue and unable to sit up without support.   Assessment   Assessment Tx focused on sitting up EOB with bed rail used with LUE to hold sitting balance with cues. Pt required Max assist x1-2 to maintain sitting balance overall. Pt assisted back to bed level and required Max assist x2 for mobility and repositioning. Pt required full clean up and brief change due to bowel incontinence. Pure wick left off incase of further bowel incontinence. Pt left at bed level on left side with pillows for skin break down prevention.   Activity Tolerance Patient limited by fatigue   Discharge Recommendations Patient would benefit from continued therapy after discharge;24 hour supervision or

## 2024-10-29 NOTE — CARE COORDINATION
Precert has been approved. When pt is medically stable, pt can discharge.  [place]   P   F  Electronically signed by Amina Scales on 10/29/2024 at 3:38 PM

## 2024-10-29 NOTE — PROGRESS NOTES
[facility] Neurology Progress Note      Patient:   Saundra Finn  MR#:    [id number]   Room:    [facility]/531-01   YOB: 1935  Date of Progress Note: 10/29/2024  Time of Note                           1:05 PM  Consulting Physician:  Chaz Delgado DO  Attending Physician:  Abraham Monet MD      INTERVAL HISTORY:  No acute events, speech, right sided weakness unchanged.     REVIEW OF SYSTEMS:  Limited given speech difficulty     PHYSICAL EXAM:    Constitutional -   /80   Pulse 80   Temp 96.8 °F (36 °C) (Temporal)   Resp 18   Ht 1.499 m (4' 11\")   Wt 50.8 kg (112 lb)   SpO2 97%   BMI 22.62 kg/m²   General appearance: No acute distress   EYES -   Conjunctiva normal  Pupillary exam as below, see CN exam in the neurologic exam  ENT-    No scars, masses, or lesions over external nose or ears  Oropharynx without erythema, palate midline  Cardiovascular -   No clubbing, cyanosis, or edema   Pulmonary-   Good expansion, normal effort without use of accessory muscles  Musculoskeletal -   No significant wasting of muscles noted  Gait as below, see gait exam in the neurologic exam  Muscle strength, tone, stability as below see the motor exam in the neurologic exam.   No bony deformities  Skin -   Warm, dry, and intact to inspection and palpation.    No rash, erythema, or pallor        NEUROLOGICAL EXAM     Mental status    [x] Awake, alert  [x] Affect attention and concentration appear appropriate  [] Recent and remote memory appears unremarkable  [] Speech normal without dysarthria or aphasia, comprehension and repetition intact.   COMMENTS:Awake, following some commands, speech difficulty noted    Cranial Nerves [] No VF deficit to confrontation,  optic discs normal, no papilledema on fundoscopic exam.  [] PERRLA, EOMI, no nystagmus, conjugate eye movements, no ptosis  [] Face symmetric  [] Facial sensation intact  [] Tongue midline no atrophy or fasciculations present  [] Palate midline, hearing to

## 2024-10-29 NOTE — PROGRESS NOTES
10/29/24 1107   Encounter Summary   Encounter Overview/Reason Loneliness/Social Isolation;Spiritual/Emotional Needs   Encounter Code  Assessment by  services   Service Provided For Patient;Family   Referral/Consult From Palliative Care   Support System Family members   Complexity of Encounter Moderate   Begin Time 1050   End Time  1110   Total Time Calculated 20 min   Spiritual/Emotional needs   Type Spiritual Support   Grief, Loss, and Adjustments   Type Adjustment to illness   Palliative Care   Type Palliative Care, Follow-up   Assessment/Intervention/Outcome   Assessment Coping;Hopeful   Intervention Active listening;Nurtured Hope;Prayer (assurance of)/[place];Sustaining Presence/Ministry of presence   Outcome Acceptance;Expressed Gratitude;Receptive   Plan and Referrals   Plan/Referrals Continue to visit, (comment);Continue Support (comment)   Does the patient have a [facility] PCP? Yes    to follow up after discharge? No         This  visited with pt and family to follow up with palliative care and provide spiritual care. Pt is now being seen by Beba Abbott, Palliative care PA. Pt was awake and spoke some with this . Provided spiritual care with sustaining presence, support, nurtured hope, ritual, and prayer. Pt and family expressed gratitude for spiritual care. Pt has been approved to go to a SNF.      Spiritual Health History and Assessment/Progress Note  [facility]    (P) Loneliness/Social Isolation, Spiritual/Emotional Needs, Trauma, (P) Adjustment to illness,      Name: Saundra Finn MRN: [id number]    Age: 89 y.o.     Sex: female   Language: English   Anabaptism: Christianity   CVA (cerebral vascular accident) ([facility])     Date: 10/29/2024            Total Time Calculated: (P) 20 min              Spiritual Assessment continued in [facility] SURG SERVICES        Referral/Consult From: (P) Palliative Care   Encounter Overview/Reason: (P) Loneliness/Social Isolation,

## 2024-10-29 NOTE — PROGRESS NOTES
Physician Progress Note      PATIENT:               MAYANK MI  [facility] #:                  [id number]  :                       1935  ADMIT DATE:       10/25/2024 9:24 AM  DISCH DATE:  RESPONDING  PROVIDER #:        Yasmeen Matias CNP          QUERY TEXT:    Patient admitted with CVA. Noted to have documentation of weight loss,   dietician assessment with severe malnutrition diagnosis, in note 10/26. If   possible, please document in progress notes and discharge summary if you are   evaluating and /or treating any of the following:    The medical record reflects the following:  Risk Factors: CVA, Age, HLD, HTN DMII, UTI  Clinical Indicators: 75% or less estimated energy requirements one month or   longer, Weight loss greater than 7.5% in 3 months, Severe body fat loss,  orbital and buccal region, Muscle mass loss temples, clavicles.  Treatment: ONS, Magic cup b.id., labs, dietary consult.    Thank you  Remedios Fisher RN, BSN, [facility]  [phone]    ASPEN Criteria:    https://aspenjournals.onlinelibrary.[place].com/doi/full/10.1177/[email or web address]  5  Options provided:  -- Protein calorie malnutrition severe  -- Other - I will add my own diagnosis  -- Disagree - Not applicable / Not valid  -- Disagree - Clinically unable to determine / Unknown  -- Refer to Clinical Documentation Reviewer    PROVIDER RESPONSE TEXT:    This patient has severe protein calorie malnutrition.    Query created by: Remedios Fisher on 10/29/2024 10:40 AM      Electronically signed by:  Yasmeen Matias CNP 10/29/2024 12:07 PM

## 2024-10-30 LAB
ALBUMIN SERPL-MCNC: 2.7 G/DL (ref 3.5–5.2)
ALP SERPL-CCNC: 47 U/L (ref 35–104)
ALT SERPL-CCNC: <5 U/L (ref 5–33)
ANION GAP SERPL CALCULATED.3IONS-SCNC: 9 MMOL/L (ref 7–19)
AST SERPL-CCNC: 10 U/L (ref 5–32)
BASOPHILS # BLD: 0 K/UL (ref 0–0.2)
BASOPHILS NFR BLD: 0.2 % (ref 0–1)
BILIRUB SERPL-MCNC: 0.5 MG/DL (ref 0.2–1.2)
BUN SERPL-MCNC: 9 MG/DL (ref 8–23)
CALCIUM SERPL-MCNC: 8.3 MG/DL (ref 8.8–10.2)
CHLORIDE SERPL-SCNC: 108 MMOL/L (ref 98–111)
CO2 SERPL-SCNC: 25 MMOL/L (ref 22–29)
CREAT SERPL-MCNC: 0.6 MG/DL (ref 0.5–0.9)
EOSINOPHIL # BLD: 0.1 K/UL (ref 0–0.6)
EOSINOPHIL NFR BLD: 0.8 % (ref 0–5)
ERYTHROCYTE [DISTWIDTH] IN BLOOD BY AUTOMATED COUNT: 13.1 % (ref 11.5–14.5)
GLUCOSE BLD-MCNC: 125 MG/DL (ref 70–99)
GLUCOSE BLD-MCNC: 132 MG/DL (ref 70–99)
GLUCOSE BLD-MCNC: 133 MG/DL (ref 70–99)
GLUCOSE SERPL-MCNC: 133 MG/DL (ref 70–99)
HCT VFR BLD AUTO: 35.4 % (ref 37–47)
HGB BLD-MCNC: 11 G/DL (ref 12–16)
IMM GRANULOCYTES # BLD: 0 K/UL
IRON SATN MFR SERPL: 8 % (ref 14–50)
IRON SERPL-MCNC: 14 UG/DL (ref 37–145)
LYMPHOCYTES # BLD: 1.8 K/UL (ref 1.1–4.5)
LYMPHOCYTES NFR BLD: 19.7 % (ref 20–40)
MAGNESIUM SERPL-MCNC: 1.4 MG/DL (ref 1.6–2.4)
MCH RBC QN AUTO: 31 PG (ref 27–31)
MCHC RBC AUTO-ENTMCNC: 31.1 G/DL (ref 33–37)
MCV RBC AUTO: 99.7 FL (ref 81–99)
MONOCYTES # BLD: 0.7 K/UL (ref 0–0.9)
MONOCYTES NFR BLD: 7.7 % (ref 0–10)
NEUTROPHILS # BLD: 6.6 K/UL (ref 1.5–7.5)
NEUTS SEG NFR BLD: 71.4 % (ref 50–65)
PERFORMED ON: ABNORMAL
PLATELET # BLD AUTO: 161 K/UL (ref 130–400)
PMV BLD AUTO: 10.5 FL (ref 9.4–12.3)
POTASSIUM SERPL-SCNC: 3.4 MMOL/L (ref 3.5–5)
PROT SERPL-MCNC: 5.3 G/DL (ref 6.4–8.3)
RBC # BLD AUTO: 3.55 M/UL (ref 4.2–5.4)
SODIUM SERPL-SCNC: 142 MMOL/L (ref 136–145)
TIBC SERPL-MCNC: 174 UG/DL (ref 250–400)
WBC # BLD AUTO: 9.3 K/UL (ref 4.8–10.8)

## 2024-10-30 PROCEDURE — 6370000000 HC RX 637 (ALT 250 FOR IP)

## 2024-10-30 PROCEDURE — 6370000000 HC RX 637 (ALT 250 FOR IP): Performed by: NURSE PRACTITIONER

## 2024-10-30 PROCEDURE — 36415 COLL VENOUS BLD VENIPUNCTURE: CPT

## 2024-10-30 PROCEDURE — 83540 ASSAY OF IRON: CPT

## 2024-10-30 PROCEDURE — 6360000002 HC RX W HCPCS: Performed by: NURSE PRACTITIONER

## 2024-10-30 PROCEDURE — 85025 COMPLETE CBC W/AUTO DIFF WBC: CPT

## 2024-10-30 PROCEDURE — 97530 THERAPEUTIC ACTIVITIES: CPT

## 2024-10-30 PROCEDURE — 1200000000 HC SEMI PRIVATE

## 2024-10-30 PROCEDURE — 99232 SBSQ HOSP IP/OBS MODERATE 35: CPT | Performed by: PSYCHIATRY & NEUROLOGY

## 2024-10-30 PROCEDURE — 80053 COMPREHEN METABOLIC PANEL: CPT

## 2024-10-30 PROCEDURE — 82962 GLUCOSE BLOOD TEST: CPT

## 2024-10-30 PROCEDURE — 6360000002 HC RX W HCPCS

## 2024-10-30 PROCEDURE — 83735 ASSAY OF MAGNESIUM: CPT

## 2024-10-30 PROCEDURE — 83550 IRON BINDING TEST: CPT

## 2024-10-30 PROCEDURE — 99231 SBSQ HOSP IP/OBS SF/LOW 25: CPT | Performed by: PHYSICIAN ASSISTANT

## 2024-10-30 RX ORDER — FERROUS SULFATE 325(65) MG
325 TABLET ORAL
Qty: 30 TABLET | Refills: 3 | Status: SHIPPED | OUTPATIENT
Start: 2024-10-30

## 2024-10-30 RX ORDER — FUROSEMIDE 40 MG/1
40 TABLET ORAL
Qty: 60 TABLET | Refills: 3 | Status: SHIPPED | OUTPATIENT
Start: 2024-10-30

## 2024-10-30 RX ORDER — MAGNESIUM SULFATE IN WATER 40 MG/ML
2000 INJECTION, SOLUTION INTRAVENOUS ONCE
Status: COMPLETED | OUTPATIENT
Start: 2024-10-30 | End: 2024-10-30

## 2024-10-30 RX ORDER — POTASSIUM CHLORIDE 1500 MG/1
40 TABLET, EXTENDED RELEASE ORAL DAILY
Qty: 14 TABLET | Refills: 0 | Status: SHIPPED | OUTPATIENT
Start: 2024-10-30 | End: 2024-11-29

## 2024-10-30 RX ORDER — POLYETHYLENE GLYCOL 3350 17 G/17G
17 POWDER, FOR SOLUTION ORAL DAILY PRN
Qty: 527 G | Refills: 0 | Status: SHIPPED | OUTPATIENT
Start: 2024-10-30 | End: 2024-11-29

## 2024-10-30 RX ORDER — ONDANSETRON 4 MG/1
4 TABLET, ORALLY DISINTEGRATING ORAL EVERY 8 HOURS PRN
Qty: 30 TABLET | Refills: 0 | Status: SHIPPED | OUTPATIENT
Start: 2024-10-30

## 2024-10-30 RX ORDER — LANOLIN ALCOHOL/MO/W.PET/CERES
400 CREAM (GRAM) TOPICAL 2 TIMES DAILY
Status: DISCONTINUED | OUTPATIENT
Start: 2024-10-30 | End: 2024-10-31 | Stop reason: HOSPADM

## 2024-10-30 RX ORDER — CLOPIDOGREL BISULFATE 75 MG/1
75 TABLET ORAL DAILY
Qty: 30 TABLET | Refills: 1 | Status: SHIPPED | OUTPATIENT
Start: 2024-10-31

## 2024-10-30 RX ORDER — GABAPENTIN 100 MG/1
CAPSULE ORAL
Qty: 15 CAPSULE | Refills: 0 | Status: SHIPPED | OUTPATIENT
Start: 2024-10-30 | End: 2024-12-30

## 2024-10-30 RX ORDER — LANOLIN ALCOHOL/MO/W.PET/CERES
400 CREAM (GRAM) TOPICAL 2 TIMES DAILY
Qty: 60 TABLET | Refills: 1 | Status: SHIPPED | OUTPATIENT
Start: 2024-10-30

## 2024-10-30 RX ADMIN — PANTOPRAZOLE SODIUM 40 MG: 40 TABLET, DELAYED RELEASE ORAL at 08:11

## 2024-10-30 RX ADMIN — GABAPENTIN 200 MG: 100 CAPSULE ORAL at 08:03

## 2024-10-30 RX ADMIN — MAGNESIUM SULFATE HEPTAHYDRATE 2000 MG: 40 INJECTION, SOLUTION INTRAVENOUS at 09:55

## 2024-10-30 RX ADMIN — ATORVASTATIN CALCIUM 80 MG: 80 TABLET, FILM COATED ORAL at 19:23

## 2024-10-30 RX ADMIN — NIACIN 500 MG: 500 TABLET, EXTENDED RELEASE ORAL at 19:23

## 2024-10-30 RX ADMIN — POTASSIUM BICARBONATE 40 MEQ: 782 TABLET, EFFERVESCENT ORAL at 08:02

## 2024-10-30 RX ADMIN — GABAPENTIN 200 MG: 100 CAPSULE ORAL at 19:23

## 2024-10-30 RX ADMIN — CLOPIDOGREL BISULFATE 75 MG: 75 TABLET ORAL at 08:03

## 2024-10-30 RX ADMIN — Medication 400 MG: at 19:23

## 2024-10-30 RX ADMIN — GABAPENTIN 100 MG: 100 CAPSULE ORAL at 12:49

## 2024-10-30 RX ADMIN — MAGNESIUM SULFATE HEPTAHYDRATE 2000 MG: 40 INJECTION, SOLUTION INTRAVENOUS at 07:12

## 2024-10-30 RX ADMIN — SERTRALINE HYDROCHLORIDE 25 MG: 50 TABLET ORAL at 08:03

## 2024-10-30 RX ADMIN — Medication 1 TABLET: at 08:04

## 2024-10-30 NOTE — PROGRESS NOTES
10/30/24 1523   Encounter Summary   Encounter Overview/Reason Loneliness/Social Isolation;Spiritual/Emotional Needs   Encounter Code  Assessment by  services   Service Provided For Patient;Family   Referral/Consult From Palliative Care   Support System Family members   Complexity of Encounter Moderate   Begin Time 1520   End Time  1540   Total Time Calculated 20 min   Spiritual/Emotional needs   Type Spiritual Support   Grief, Loss, and Adjustments   Type Adjustment to illness   Palliative Care   Type Palliative Care, Follow-up   Assessment/Intervention/Outcome   Assessment Coping   Intervention Active listening;Prayer (assurance of)/Wolf;Sustaining Presence/Ministry of presence   Outcome Acceptance;Expressed Gratitude   Plan and Referrals   Plan/Referrals Continue to visit, (comment);Continue Support (comment)   Does the patient have a [facility] PCP? Yes    to follow up after discharge? No         This  was called to look at a document for family. They needed a POA notarized. The POA does not cover medical decisions. Pt's daughter in law brought the document and the document requires two witnesses and a notary. This  cannot provide the witnesses and unable to notarize the documents. Provided spiritual care with sustaining presence, and an open eyed prayer. Pt and family expressed gratitude for spiritual care.       Spiritual Health History and Assessment/Progress Note  [facility]    (P) Loneliness/Social Isolation, Spiritual/Emotional Needs, Trauma, (P) Adjustment to illness,      Name: Saundra Finn MRN: [id number]    Age: 89 y.o.     Sex: female   Language: English   Adventist: Holiness   CVA (cerebral vascular accident) ([facility])     Date: 10/30/2024            Total Time Calculated: (P) 20 min              Spiritual Assessment continued in [facility] SURG [facility]        Referral/Consult From: (P) Palliative Care   Encounter Overview/Reason: (P) Loneliness/Social  Isolation, Spiritual/Emotional Needs  Service Provided For: (P) Patient, Family    Cait, Belief, Meaning:   Patient identifies as spiritual and is connected with a cait tradition or spiritual practice  Family/Friends Other: Family did not discuss their cait.      Importance and Influence:  Patient has spiritual/personal beliefs that influence decisions regarding their health  Family/Friends Other: Family did not discuss.    Community:  Patient is connected with a spiritual community  Family/Friends Other: Did not discuss.    Assessment and Plan of Care:     Patient Interventions include: Provided sacramental/Gnosticist ritual  Family/Friends Interventions include: Other: Pt's family present during prayer and support.    Patient Plan of Care: Spiritual Care available upon further referral  Family/Friends Plan of Care: Spiritual Care available upon further referral    Electronically signed by Mary Behrens, Chaplain on 10/30/2024 at 3:44 PM

## 2024-10-30 NOTE — PROGRESS NOTES
[facility] Neurology Progress Note      Patient:   Saundra Finn  MR#:    [id number]   Room:    [facility]/531-01   YOB: 1935  Date of Progress Note: 10/30/2024  Time of Note                           12:51 PM  Consulting Physician:  Chaz Delgado DO  Attending Physician:  Abraham Monet MD      INTERVAL HISTORY:  No acute events, speech, right sided weakness about the same.     REVIEW OF SYSTEMS:  Limited given speech difficulty     PHYSICAL EXAM:    Constitutional -   /66   Pulse 85   Temp 97.9 °F (36.6 °C) (Temporal)   Resp 14   Ht 1.499 m (4' 11\")   Wt 50.8 kg (112 lb)   SpO2 99%   BMI 22.62 kg/m²   General appearance: No acute distress   EYES -   Conjunctiva normal  Pupillary exam as below, see CN exam in the neurologic exam  ENT-    No scars, masses, or lesions over external nose or ears  Oropharynx without erythema, palate midline  Cardiovascular -   No clubbing, cyanosis, or edema   Pulmonary-   Good expansion, normal effort without use of accessory muscles  Musculoskeletal -   No significant wasting of muscles noted  Gait as below, see gait exam in the neurologic exam  Muscle strength, tone, stability as below see the motor exam in the neurologic exam.   No bony deformities  Skin -   Warm, dry, and intact to inspection and palpation.    No rash, erythema, or pallor        NEUROLOGICAL EXAM     Mental status    [x] Awake, alert  [x] Affect attention and concentration appear appropriate  [] Recent and remote memory appears unremarkable  [] Speech normal without dysarthria or aphasia, comprehension and repetition intact.   COMMENTS:Awake, following some commands, speech difficulty noted    Cranial Nerves [] No VF deficit to confrontation,  optic discs normal, no papilledema on fundoscopic exam.  [] PERRLA, EOMI, no nystagmus, conjugate eye movements, no ptosis  [] Face symmetric  [] Facial sensation intact  [] Tongue midline no atrophy or fasciculations present  [] Palate midline,  paranasal sinuses and mastoid air cells are clear.  The calvarium is intact.  Hyperostosis frontalis interna is present.  -------------------------------    1.  Critical result:  Hyperdense left middle cerebral artery, suggestive of acute thrombus. 2.  Chronic small vessel ischemic changes and atrophy.  Comment:  Findings were discussed with Dr. Galo at 10:07 a.m. on 10/25/2024. ---------------------------  All CT scans are performed using dose optimization techniques as appropriate to the performed exam and include at least one of the following: Automated exposure control, adjustment of the mA and/or kV according to size, and the use of iterative reconstruction technique.  ______________________________________ Electronically signed by: KEITH NICHOLSON M.D. Date:     10/25/2024 Time:    10:01       Lab Results   Component Value Date    WBC 9.3 10/30/2024    HGB 11.0 (L) 10/30/2024    HCT 35.4 (L) 10/30/2024    MCV 99.7 (H) 10/30/2024     10/30/2024     Lab Results   Component Value Date     10/30/2024    K 3.4 (L) 10/30/2024     10/30/2024    CO2 25 10/30/2024    BUN 9 10/30/2024    CREATININE 0.6 10/30/2024    GLUCOSE 133 (H) 10/30/2024    CALCIUM 8.3 (L) 10/30/2024    BILITOT 0.5 10/30/2024    ALKPHOS 47 10/30/2024    AST 10 10/30/2024    ALT <5 (A) 10/30/2024    LABGLOM 86 10/30/2024    GFRAA >59 11/10/2021    AGRATIO 1.8 09/07/2021    AGRATIO 1.2 09/07/2021    GLOB 2.7 06/05/2024     Lab Results   Component Value Date    INR 0.99 10/25/2024    INR 1.06 01/13/2016    PROTIME 12.8 10/25/2024    PROTIME 13.5 01/13/2016       RECORD REVIEW:   Previous medical records, medications were reviewed at today's visit.  Nursing/physician notes, imaging, labs and vitals reviewed.   PT,OT and/or speech notes reviewed         ASSESSMENT:  89 y.o. admitted with speech difficulty right-sided weakness.  CT head with a hyperdense left MCA sign.  CTA consistent with proximal branch MCA occlusion.  Severe right

## 2024-10-30 NOTE — PROGRESS NOTES
Comprehensive Nutrition Assessment    Type and Reason for Visit:  Reassess    Nutrition Recommendations/Plan:   Continue POC     Malnutrition Assessment:  Malnutrition Status:  Severe malnutrition (10/30/24 0927)    Context:  Chronic Illness     Findings of the 6 clinical characteristics of malnutrition:  Energy Intake:  No significant decrease in energy intake  Weight Loss:  No significant weight loss     Body Fat Loss:  Severe body fat loss Orbital, Buccal region   Muscle Mass Loss:  Severe muscle mass loss Temples (temporalis), Clavicles (pectoralis & deltoids)  Fluid Accumulation:  No significant fluid accumulation Extremities   Strength:  Not Performed    Nutrition Assessment:    Pt is improving from a nutritional standpoint. PO intake of meals is good. No s/s intolerance to current diet. Last BM noted 10/29. Continue POC.    Nutrition Related Findings:    trace BLE edema; BM 10/25 Wound Type: None       Current Nutrition Intake & Therapies:    Average Meal Intake: %  ADULT DIET; Dysphagia - Pureed; Mildly Thick (Nectar)  ADULT ORAL NUTRITION SUPPLEMENT; Breakfast, Lunch, Dinner; Frozen Oral Supplement    Anthropometric Measures:  Height: 149.9 cm (4' 11\")  Ideal Body Weight (IBW): 95 lbs (43 kg)    Current Body Weight: 50.8 kg (111 lb 15.9 oz), 107.4 % IBW.    Current BMI (kg/m2): 22.6  Usual Body Weight: 50.8 kg (112 lb) (8/7/24)  % Weight Change (Calculated): -8.9  BMI Categories: Normal Weight (BMI 22.0 to 24.9) age over 65    Estimated Daily Nutrient Needs:  Energy Requirements Based On: Kcal/kg  Weight Used for Energy Requirements: Current  Energy (kcal/day): 0052-3478 kcals/day  Weight Used for Protein Requirements: Current  Protein (g/day):  g/protein/day  Method Used for Fluid Requirements: 1 ml/kcal  Fluid (ml/day): 3854-1633 mL/day    Nutrition Diagnosis:   No nutrition diagnosis at this time    Nutrition Interventions:   Food and/or Nutrient Delivery: Continue Current Diet, Continue

## 2024-10-30 NOTE — PROGRESS NOTES
Physical Therapy  Name: Saundra Finn  MRN:  [id number]  Date of service:  10/30/2024     10/30/24 1521   Restrictions/Precautions   Restrictions/Precautions Fall Risk;Modified Diet   Subjective   Subjective Pt agreed to therapy.   Pain Assessment   Pain Assessment None - Denies Pain   Bed Mobility   Supine to Sit Maximal assistance  (x2)   Sit to Supine Maximal assistance  (x2)   Comment pt sat EOB several minutes and able to maintain sitting balance with close CGA majority of the time   Transfers   Sit to Stand Moderate Assistance;2 Person Assistance   Stand to Sit Moderate Assistance;2 Person Assistance   Comment stood partially 2x, leaning back against bed and not able to fully gain balance   Short Term Goals   Time Frame for Short Term Goals 14 DAYS   Short Term Goal 1 BED MOB MIN ASSIST   Short Term Goal 2 BED TO CHAIR MOD ASSIST   Conditions Requiring Skilled Therapeutic Intervention   Body Structures, Functions, Activity Limitations Requiring Skilled Therapeutic Intervention Decreased functional mobility ;Decreased ADL status;Decreased endurance;Decreased ROM;Decreased balance;Decreased posture   Assessment Pt able to improve sitting balance this date, did fatigue towards end of session after attempting stands. Not able to fully stand and gain balance, may be able to attempt  SS to block knees. Assisted back to bed with all needs in reach.   Activity Tolerance   Activity Tolerance Patient tolerated treatment well   PT Plan of Care   Wednesday X   Safety Devices   Type of Devices Bed alarm in place;Call light within reach;Left in bed           Electronically signed by Marjan Henderson PTA on 10/30/2024 at 3:25 PM

## 2024-10-30 NOTE — PROGRESS NOTES
Occupational Therapy     10/30/24 1600   Subjective   Subjective Pt in bed upon arrival for therapy. Pt agreeable to participate. Daughter in law present.   Pain Assessment   Pain Assessment None - Denies Pain   Cognition   Overall Cognitive Status WFL   Cognition Comment Awake, speaking intelligibly, following simple directions.   Orientation   Overall Orientation Status WF   Bed Mobility Training   Bed Mobility Training Yes   Overall Level of Assistance Moderate assistance;Maximum assistance;Assist X2   Interventions Verbal cues;Tactile cues;Manual cues   Supine to Sit Moderate assistance;Maximum assistance;Assist X2   Sit to Supine Maximum assistance   Scooting Maximum assistance;Assist X2  (in supine)   Transfer Training   Transfer Training Yes   Overall Level of Assistance Moderate assistance;Assist X2   Interventions Verbal cues;Tactile cues;Manual cues   Sit to Stand Moderate assistance;Assist X2   Stand to Sit Moderate assistance;Assist X2   Balance   Sitting Impaired   Sitting - Static Occasional   Sitting - Dynamic Poor (constant support);Occasional   Standing With support   ADL   Feeding Setup;Moderate assistance   Grooming Setup;Moderate assistance   UE Bathing Moderate assistance;Maximum assistance   LE Bathing Maximum assistance   UE Dressing Moderate assistance;Maximum assistance   LE Dressing Maximum assistance   Toileting Maximum assistance;Dependent/Total   Toileting Skilled Clinical Factors incontinent of bowel and bladder.   Functional Mobility Maximum assistance   Functional Mobility Skilled Clinical Factors Mod assist x2   Assessment   Assessment Tx focused on sitting EOB to work on sitting balance and trunk control. Pt agreeable to attempt standing, using LUE on bed rail. Pt able to stand on 2 occasions with Mod assist x2. Pt returned to bed after tasks.   Activity Tolerance Patient tolerated treatment well;Patient limited by endurance   Discharge Recommendations Patient would benefit from

## 2024-10-30 NOTE — DISCHARGE INSTR - DIET
Good nutrition is important when healing from an illness, injury, or surgery.  Follow any nutrition recommendations given to you during your hospital stay.   If you were given an oral nutrition supplement while in the hospital, continue to take this supplement at home.  You can take it with meals, in-between meals, and/or before bedtime. These supplements can be purchased at most local grocery stores, pharmacies, and chain Virtual Web-stores.   If you have any questions about your diet or nutrition, call the hospital and ask for the dietitian.  ADULT DIET; Dysphagia - Pureed; Mildly Thick (Nectar)  ADULT ORAL NUTRITION SUPPLEMENT; Breakfast, Lunch, Dinner; Frozen Oral Supplement

## 2024-10-30 NOTE — DISCHARGE SUMMARY
[facility]    Discharge Summary      Saundra Finn  :  1935  MRN:  [id number]    Admit date:  10/25/2024  Discharge date:    10/30/2024    Discharging Physician:  Dr. Monet    Advance Directive: DNR    Consults: neurology    Primary Care Physician:  Margarita Jerome MD    Discharge Diagnoses:  Principal Problem:    CVA (cerebral vascular accident) ([facility])  Active Problems:    H/O prosthetic aortic valve replacement    Hyperlipidemia    Essential hypertension    Type 2 diabetes mellitus without complication, without long-term current use of insulin (HCC)    Chronic atrial fibrillation (HCC)    Lauryn filter in place    Stroke-like symptom    UTI (urinary tract infection)    Severe malnutrition (HCC)    Palliative care patient  Resolved Problems:    * No resolved hospital problems. *      Portions of this note have been copied forward, however, changed to reflect the most current clinical status of this patient.    Hospital Course:    This patient is an 89-year-old female with multiple comorbidities including atrial fibrillation on Eliquis, IVC filter placement, remote AVR, hyperlipidemia, hypertension, T2DM, remote history of breast cancer, MONAE, CKD stage IIIa, TIA who presented to [facility] ED on 10/25 from her home with family for evaluation of AMS, found to have garbled speech and right sided weakness that morning by family, last known well was the night before. Of note, family reports that patient has had worsening diarrhea over the last several months, in addition to dark/tarry stools, was taken off of Eliquis 3 to 4 weeks ago with plans for outpatient GI follow-up due to a positive Hemoccult at PCP office however GI could not see patient until December.  In ER, evidence of UTI with 4+ bacteria and large LE, lab work overall stable, chest x-ray negative, CT with severe stenosis of right internal carotid artery, occluded left MCA distal M1 segment, moderate stenosis of right vertebral artery. Family  for Nausea or Vomiting     polyethylene glycol 17 g packet  Commonly known as: GLYCOLAX  Take 1 packet by mouth daily as needed for Constipation            CHANGE how you take these medications      ferrous sulfate 325 (65 Fe) MG tablet  Commonly known as: IRON 325  Take 1 tablet by mouth daily (with breakfast)  What changed: when to take this     furosemide 40 MG tablet  Commonly known as: LASIX  Take 1 tablet by mouth every 48 hours as needed (swelling/edema, SOB)  What changed: See the new instructions.     potassium chloride 20 MEQ extended release tablet  Commonly known as: KLOR-CON M  Take 2 tablets by mouth daily Indications: For low potassium  What changed: how much to take            CONTINUE taking these medications      atorvastatin 80 MG tablet  Commonly known as: LIPITOR  Take 1 tablet by mouth nightly For cholesterol     gabapentin 100 MG capsule  Commonly known as: NEURONTIN  Take 2 tablets by mouth in the morning 1 tablet at lunch and 2 tablets at bedtime for peripheral neuropathy intended supply: 30 days     niacin 500 MG extended release capsule     omeprazole 40 MG delayed release capsule  Commonly known as: PRILOSEC  TAKE 1 CAPSULE BY MOUTH DAILY FOR STOMACH     Oyster Shell Calcium/D 500-200 MG-UNIT Tabs  Take 1 tablet by mouth daily     sertraline 25 MG tablet  Commonly known as: ZOLOFT  TAKE 1 TABLET BY MOUTH DAILY            STOP taking these medications      aspirin 325 MG tablet     aspirin 81 MG EC tablet     Prolia 60 MG/ML Soln SC injection  Generic drug: denosumab               Where to Get Your Medications        These medications were sent to [facility] [place], [place] - [address] [facility] [place], [place] [phone] - F [phone] [address] [address], [facility] [address]      Phone: [phone]   clopidogrel 75 MG tablet  ferrous sulfate 325 (65 Fe) MG tablet  furosemide 40 MG tablet  gabapentin 100 MG capsule  magnesium oxide 400 (240 Mg) MG

## 2024-10-30 NOTE — PROGRESS NOTES
Palliative Care Progress Note  10/30/2024 11:06 AM    Patient:  Saundra Finn  YOB: 1935  Primary Care Physician: Margarita Jerome MD  Advance Directive: DNR  Admit Date: 10/25/2024       Hospital Day: 5  Portions of this note have been copied forward, however, changed to reflect the most current clinical status of this patient.    CHIEF COMPLAINT/REASON FOR CONSULTATION Goals of care, family support, Code status, symptom management     SUBJECTIVE:  Ms. Finn is more alert today and is watching [employer] [place] [employer]. She has no new complaints.     HPI:  The patient is a 89 y.o. female with PMH MGUS, paroxysmal atrial fibrillation (taken off Eliquis due to frequent falls), CVA, s/p IVC filter, memory loss, peripheral neuropathy 2/2 DM II, CKD III, prosthetic aortic valve replacement, TIA who presented to [facility] ED on 10/25/2024 with concern for altered mental status upon awakening.  She was documented in the ED to have flaccid paralysis on the right side as well as aphasia.  Due to unknown time of occurrence she was felt to be outside thrombolytic window.  Transfer was discussed according to documentation for consideration of thrombectomy however patient and family declined transfer at that time.  Chest x-ray reported no acute findings.  CT brain perfusion reported left MCA territory ischemia, no core infarct.  CT head reported hyperdense left middle cerebral artery suggestive of acute thrombus.  Chronic small vessel ischemic changes noted.  CTA head and neck reported severe stenosis of the right internal carotid artery mid-distal cervical and intracranial segments, moderate stenosis of the left intracranial internal carotid artery, occluded left MCA distal M1 segment with reduced opacification of vessels distally, moderate stenosis of the right vertebral artery V4 segment.  She was admitted to hospitalist services and neurology was consulted.  MRI confirmed large left MCA stroke as well as right MCA

## 2024-10-31 VITALS
TEMPERATURE: 97.9 F | SYSTOLIC BLOOD PRESSURE: 132 MMHG | OXYGEN SATURATION: 97 % | WEIGHT: 112 LBS | HEART RATE: 89 BPM | DIASTOLIC BLOOD PRESSURE: 68 MMHG | BODY MASS INDEX: 22.58 KG/M2 | RESPIRATION RATE: 16 BRPM | HEIGHT: 59 IN

## 2024-10-31 LAB
ALBUMIN SERPL-MCNC: 2.5 G/DL (ref 3.5–5.2)
ALP SERPL-CCNC: 49 U/L (ref 35–104)
ALT SERPL-CCNC: 5 U/L (ref 5–33)
ANION GAP SERPL CALCULATED.3IONS-SCNC: 9 MMOL/L (ref 7–19)
AST SERPL-CCNC: 12 U/L (ref 5–32)
BASOPHILS # BLD: 0 K/UL (ref 0–0.2)
BASOPHILS NFR BLD: 0.5 % (ref 0–1)
BILIRUB SERPL-MCNC: 0.4 MG/DL (ref 0.2–1.2)
BUN SERPL-MCNC: 12 MG/DL (ref 8–23)
CALCIUM SERPL-MCNC: 7.9 MG/DL (ref 8.8–10.2)
CHLORIDE SERPL-SCNC: 105 MMOL/L (ref 98–111)
CO2 SERPL-SCNC: 24 MMOL/L (ref 22–29)
CREAT SERPL-MCNC: 0.6 MG/DL (ref 0.5–0.9)
EOSINOPHIL # BLD: 0.1 K/UL (ref 0–0.6)
EOSINOPHIL NFR BLD: 1.5 % (ref 0–5)
ERYTHROCYTE [DISTWIDTH] IN BLOOD BY AUTOMATED COUNT: 13.1 % (ref 11.5–14.5)
GLUCOSE BLD-MCNC: 108 MG/DL (ref 70–99)
GLUCOSE SERPL-MCNC: 93 MG/DL (ref 70–99)
HCT VFR BLD AUTO: 38.2 % (ref 37–47)
HGB BLD-MCNC: 11.3 G/DL (ref 12–16)
IMM GRANULOCYTES # BLD: 0 K/UL
LYMPHOCYTES # BLD: 1.7 K/UL (ref 1.1–4.5)
LYMPHOCYTES NFR BLD: 20.1 % (ref 20–40)
MCH RBC QN AUTO: 31 PG (ref 27–31)
MCHC RBC AUTO-ENTMCNC: 29.6 G/DL (ref 33–37)
MCV RBC AUTO: 104.7 FL (ref 81–99)
MONOCYTES # BLD: 0.8 K/UL (ref 0–0.9)
MONOCYTES NFR BLD: 9.5 % (ref 0–10)
NEUTROPHILS # BLD: 5.6 K/UL (ref 1.5–7.5)
NEUTS SEG NFR BLD: 68 % (ref 50–65)
PERFORMED ON: ABNORMAL
PLATELET # BLD AUTO: 174 K/UL (ref 130–400)
PMV BLD AUTO: 10.6 FL (ref 9.4–12.3)
POTASSIUM SERPL-SCNC: 3.9 MMOL/L (ref 3.5–5)
PROT SERPL-MCNC: 5.1 G/DL (ref 6.4–8.3)
RBC # BLD AUTO: 3.65 M/UL (ref 4.2–5.4)
SODIUM SERPL-SCNC: 138 MMOL/L (ref 136–145)
WBC # BLD AUTO: 8.2 K/UL (ref 4.8–10.8)

## 2024-10-31 PROCEDURE — 82962 GLUCOSE BLOOD TEST: CPT

## 2024-10-31 PROCEDURE — 6370000000 HC RX 637 (ALT 250 FOR IP)

## 2024-10-31 PROCEDURE — 36415 COLL VENOUS BLD VENIPUNCTURE: CPT

## 2024-10-31 PROCEDURE — 6370000000 HC RX 637 (ALT 250 FOR IP): Performed by: NURSE PRACTITIONER

## 2024-10-31 PROCEDURE — 2580000003 HC RX 258: Performed by: NURSE PRACTITIONER

## 2024-10-31 PROCEDURE — 85025 COMPLETE CBC W/AUTO DIFF WBC: CPT

## 2024-10-31 PROCEDURE — 80053 COMPREHEN METABOLIC PANEL: CPT

## 2024-10-31 RX ADMIN — CLOPIDOGREL BISULFATE 75 MG: 75 TABLET ORAL at 08:24

## 2024-10-31 RX ADMIN — GABAPENTIN 200 MG: 100 CAPSULE ORAL at 08:24

## 2024-10-31 RX ADMIN — SODIUM CHLORIDE, PRESERVATIVE FREE 10 ML: 5 INJECTION INTRAVENOUS at 08:25

## 2024-10-31 RX ADMIN — Medication 400 MG: at 08:24

## 2024-10-31 RX ADMIN — PANTOPRAZOLE SODIUM 40 MG: 40 TABLET, DELAYED RELEASE ORAL at 08:24

## 2024-10-31 RX ADMIN — SERTRALINE HYDROCHLORIDE 25 MG: 50 TABLET ORAL at 08:24

## 2024-10-31 RX ADMIN — Medication 1 TABLET: at 08:24

## 2024-10-31 RX ADMIN — POTASSIUM BICARBONATE 40 MEQ: 782 TABLET, EFFERVESCENT ORAL at 08:25

## 2024-10-31 NOTE — PROGRESS NOTES
Report called to [place] around 1830 after multiple attempts. EMS transport requested around 1845.

## 2024-10-31 NOTE — PLAN OF CARE
Problem: Chronic Conditions and Co-morbidities  Goal: Patient's chronic conditions and co-morbidity symptoms are monitored and maintained or improved  10/31/2024 0937 by Layne Vargas RN  Outcome: Adequate for Discharge  Flowsheets (Taken 10/31/2024 0730)  Care Plan - Patient's Chronic Conditions and Co-Morbidity Symptoms are Monitored and Maintained or Improved:   Monitor and assess patient's chronic conditions and comorbid symptoms for stability, deterioration, or improvement   Collaborate with multidisciplinary team to address chronic and comorbid conditions and prevent exacerbation or deterioration   Update acute care plan with appropriate goals if chronic or comorbid symptoms are exacerbated and prevent overall improvement and discharge  10/31/2024 0209 by Crista Fraga RN  Outcome: Progressing     Problem: Discharge Planning  Goal: Discharge to home or other facility with appropriate resources  10/31/2024 0937 by Layne Vargas RN  Outcome: Adequate for Discharge  Flowsheets (Taken 10/31/2024 0730)  Discharge to home or other facility with appropriate resources:   Identify barriers to discharge with patient and caregiver   Arrange for needed discharge resources and transportation as appropriate   Arrange for interpreters to assist at discharge as needed   Identify discharge learning needs (meds, wound care, etc)   Refer to discharge planning if patient needs post-hospital services based on physician order or complex needs related to functional status, cognitive ability or social support system  10/31/2024 0209 by Crista Fraga RN  Outcome: Progressing     Problem: Skin/Tissue Integrity  Goal: Absence of new skin breakdown  Description: 1.  Monitor for areas of redness and/or skin breakdown  2.  Assess vascular access sites hourly  3.  Every 4-6 hours minimum:  Change oxygen saturation probe site  4.  Every 4-6 hours:  If on nasal continuous positive airway pressure, respiratory  Implement oral medicated treatments as ordered  Taken 10/31/2024 0730  Oral Mucous Membranes Remain Intact:   Assess oral mucosa and hygiene practices   Implement preventative oral hygiene regimen   Implement oral medicated treatments as ordered  10/31/2024 0209 by Crista Fraga RN  Outcome: Progressing     Problem: Gastrointestinal - Adult  Goal: Minimal or absence of nausea and vomiting  10/31/2024 0937 by Layne Vargas RN  Outcome: Adequate for Discharge  Flowsheets (Taken 10/31/2024 0730)  Minimal or absence of nausea and vomiting: Administer IV fluids as ordered to ensure adequate hydration  10/31/2024 0209 by Crista Fraga RN  Outcome: Progressing  Goal: Maintains or returns to baseline bowel function  10/31/2024 0937 by Layne Vargas RN  Outcome: Adequate for Discharge  Flowsheets (Taken 10/31/2024 0730)  Maintains or returns to baseline bowel function:   Assess bowel function   Encourage oral fluids to ensure adequate hydration  10/31/2024 0209 by Crista Fraga RN  Outcome: Progressing  Goal: Maintains adequate nutritional intake  10/31/2024 0937 by Layne Vargsa RN  Outcome: Adequate for Discharge  Flowsheets (Taken 10/31/2024 0730)  Maintains adequate nutritional intake:   Monitor percentage of each meal consumed   Identify factors contributing to decreased intake, treat as appropriate   Assist with meals as needed   Monitor intake and output, weight and lab values   Obtain nutritional consult as needed  10/31/2024 0209 by Crista Fraga RN  Outcome: Progressing  Goal: Establish and maintain optimal ostomy function  10/31/2024 0937 by Layne Vargas RN  Outcome: Adequate for Discharge  Flowsheets (Taken 10/31/2024 0730)  Establish and maintain optimal ostomy function:   Administer IV fluids and TPN as ordered   Monitor output from ostomies   Introduce and advance enteral feedings as ordered   Nutrition consult  10/31/2024 0209 by Crista Fraga

## 2024-10-31 NOTE — PROGRESS NOTES
Pt's family refusing pt to be transferred to [facility] tonight due to the late hour.  [place] and [facility] notified of change in plans. JOE Corado notified Dr. Clarke that pt would not be discharging tonight. Pt's family updated.

## 2024-10-31 NOTE — CARE COORDINATION
10/31/24 0924   IMM Letter   IMM Letter given to Patient/Family/Significant other/Guardian/POA/by: luz elena do sw   IMM Letter date given: 10/31/24   IMM Letter time given: 0911     Second IMM given to patient and explained with patient verbalizing understanding.  All questions and concerns addressed     Signed letter placed in pt soft chart   Patient declined waiting 4 hr period prior to discharge.   Electronically signed by Luz Elena Do on 10/31/2024 at 9:24 AM

## 2024-11-04 ENCOUNTER — TELEPHONE (OUTPATIENT)
Dept: NEUROLOGY | Age: 88
End: 2024-11-04

## 2024-11-04 NOTE — TELEPHONE ENCOUNTER
Patient was told to Schedule an appointment with Chaz Delgado DO (Neurology) in 1 week (11/6/2024); for hospital f/u . Please called Rosana Leger at [phone].    Thank you

## 2024-11-24 PROBLEM — N39.0 UTI (URINARY TRACT INFECTION): Status: RESOLVED | Noted: 2024-10-25 | Resolved: 2024-11-24

## 2024-12-02 ENCOUNTER — TELEPHONE (OUTPATIENT)
Dept: HEMATOLOGY | Age: 88
End: 2024-12-02

## 2024-12-02 NOTE — TELEPHONE ENCOUNTER
Received call from Josefina with [facility] requesting to cancel patient's appointment- Josefina stated, \"Patient's family left a note stating, \"We do not want her to go to appointment.\" Josefina's phone # [phone] - Message sent to Provider RN-th 12/02/24 After notifying provider RN of patient family's request appointment cancellation was approved-

## 2025-06-11 RX ORDER — OMEPRAZOLE 40 MG/1
CAPSULE, DELAYED RELEASE ORAL
Qty: 90 CAPSULE | Refills: 3 | Status: SHIPPED | OUTPATIENT
Start: 2025-06-11

## (undated) DEVICE — TBG SMPL FLTR LINE NASL 02/C02 A/ BX/100

## (undated) DEVICE — ENDOGATOR AUXILIARY WATER JET CONNECTOR: Brand: ENDOGATOR

## (undated) DEVICE — SENSR O2 OXIMAX FNGR A/ 18IN NONSTR

## (undated) DEVICE — Device: Brand: DEFENDO AIR/WATER/SUCTION AND BIOPSY VALVE

## (undated) DEVICE — YANKAUER,BULB TIP WITH VENT: Brand: ARGYLE

## (undated) DEVICE — THE CHANNEL CLEANING BRUSH IS A NYLON FLEXI BRUSH ATTACHED TO A FLEXIBLE PLASTIC SHEATH DESIGNED TO SAFELY REMOVE DEBRIS FROM FLEXIBLE ENDOSCOPES.

## (undated) DEVICE — MASK,OXYGEN,MED CONC,ADLT,7' TUB, UC: Brand: PENDING

## (undated) DEVICE — CUFF,BP,DISP,1 TUBE,ADULT,HP: Brand: MEDLINE

## (undated) DEVICE — THE SINGLE USE ETRAP – POLYP TRAP IS USED FOR SUCTION RETRIEVAL OF ENDOSCOPICALLY REMOVED POLYPS.: Brand: ETRAP

## (undated) DEVICE — SNAR POLYP SENSATION MICRO OVL 13 240X40